# Patient Record
Sex: MALE | Race: WHITE | NOT HISPANIC OR LATINO | Employment: OTHER | URBAN - METROPOLITAN AREA
[De-identification: names, ages, dates, MRNs, and addresses within clinical notes are randomized per-mention and may not be internally consistent; named-entity substitution may affect disease eponyms.]

---

## 2017-08-25 ENCOUNTER — TELEPHONE (OUTPATIENT)
Dept: UROLOGY | Facility: CLINIC | Age: 62
End: 2017-08-25

## 2017-08-25 NOTE — TELEPHONE ENCOUNTER
----- Message from Dayanna Hunt RT sent at 8/25/2017  9:08 AM CDT -----  Contact: Haley (wife) [phone]   Haley (wife) [phone], requesting a [facility] ER F/U appt (08/13/2017) for the pt as soon as possible for kidney stones, she have copy of the CD, thanks.

## 2017-08-29 ENCOUNTER — TELEPHONE (OUTPATIENT)
Dept: UROLOGY | Facility: CLINIC | Age: 62
End: 2017-08-29

## 2017-08-29 ENCOUNTER — OFFICE VISIT (OUTPATIENT)
Dept: UROLOGY | Facility: CLINIC | Age: 62
End: 2017-08-29
Payer: COMMERCIAL

## 2017-08-29 ENCOUNTER — APPOINTMENT (OUTPATIENT)
Dept: LAB | Facility: HOSPITAL | Age: 62
End: 2017-08-29
Attending: UROLOGY
Payer: COMMERCIAL

## 2017-08-29 VITALS
DIASTOLIC BLOOD PRESSURE: 73 MMHG | WEIGHT: 253.75 LBS | SYSTOLIC BLOOD PRESSURE: 124 MMHG | BODY MASS INDEX: 35.52 KG/M2 | HEART RATE: 70 BPM | TEMPERATURE: 98 F | HEIGHT: 71 IN

## 2017-08-29 DIAGNOSIS — N20.0 NEPHROLITHIASIS: Primary | ICD-10-CM

## 2017-08-29 PROCEDURE — 82365 CALCULUS SPECTROSCOPY: CPT

## 2017-08-29 PROCEDURE — 99999 PR PBB SHADOW E&M-EST. PATIENT-LVL V: CPT | Mod: PBBFAC,,, | Performed by: UROLOGY

## 2017-08-29 PROCEDURE — 87086 URINE CULTURE/COLONY COUNT: CPT

## 2017-08-29 PROCEDURE — 99244 OFF/OP CNSLTJ NEW/EST MOD 40: CPT | Mod: S$GLB,,, | Performed by: UROLOGY

## 2017-08-29 RX ORDER — NITROGLYCERIN 0.4 MG/1
0.4 TABLET SUBLINGUAL EVERY 5 MIN PRN
Refills: 3 | COMMUNITY
Start: 2017-08-07 | End: 2021-03-26 | Stop reason: SDUPTHER

## 2017-08-29 RX ORDER — LEVOTHYROXINE SODIUM 150 UG/1
150 TABLET ORAL DAILY
Refills: 3 | COMMUNITY
Start: 2017-07-03 | End: 2021-10-06 | Stop reason: SDUPTHER

## 2017-08-29 RX ORDER — METFORMIN HYDROCHLORIDE 500 MG/1
1000 TABLET, EXTENDED RELEASE ORAL 2 TIMES DAILY
Refills: 3 | Status: ON HOLD | COMMUNITY
Start: 2017-06-03 | End: 2022-03-29 | Stop reason: SDUPTHER

## 2017-08-29 RX ORDER — PRASUGREL HYDROCHLORIDE 10 MG/1
TABLET, COATED ORAL
Refills: 5 | COMMUNITY
Start: 2017-08-20 | End: 2017-12-13 | Stop reason: SDUPTHER

## 2017-08-29 RX ORDER — SIMVASTATIN 20 MG/1
TABLET, FILM COATED ORAL
Refills: 2 | COMMUNITY
Start: 2017-07-03 | End: 2020-09-29

## 2017-08-29 RX ORDER — POTASSIUM CHLORIDE 750 MG/1
CAPSULE, EXTENDED RELEASE ORAL
Refills: 1 | COMMUNITY
Start: 2017-05-25 | End: 2020-09-02

## 2017-08-29 RX ORDER — ESCITALOPRAM OXALATE 10 MG/1
10 TABLET ORAL DAILY
Refills: 2 | COMMUNITY
Start: 2017-07-18 | End: 2023-05-03

## 2017-08-29 RX ORDER — LISINOPRIL 10 MG/1
10 TABLET ORAL DAILY
COMMUNITY
End: 2020-09-10

## 2017-08-29 RX ORDER — METOPROLOL SUCCINATE 50 MG/1
75 TABLET, EXTENDED RELEASE ORAL DAILY
Refills: 3 | COMMUNITY
Start: 2017-08-07 | End: 2021-05-28 | Stop reason: SDUPTHER

## 2017-08-29 RX ORDER — LOSARTAN POTASSIUM 50 MG/1
TABLET ORAL
Refills: 2 | COMMUNITY
Start: 2017-08-18 | End: 2020-09-08

## 2017-08-29 NOTE — LETTER
August 29, 2017      Pancho Onofre MD  [address] [place] [address]           [place] [facility]  [address]  [place] [address]  Phone: [phone]          Patient: Jigar Acharya   MR Number: [id number]   YOB: 1955   Date of Visit: 8/29/2017       Dear Dr. Pancho Onofre:    Thank you for referring Jigar Acharya to me for evaluation. Attached you will find relevant portions of my assessment and plan of care.    If you have questions, please do not hesitate to call me. I look forward to following Jigar Acharya along with you.    Sincerely,    Haley Mixon MD    Enclosure  CC:  No Recipients    If you would like to receive this communication electronically, please contact [email or web address] or [phone] to request more information on [employer] Link access.    For providers and/or their staff who would like to refer a patient to [facility], please contact us through our one-stop-shop provider referral line, [facility], at [phone].    If you feel you have received this communication in error or would no longer like to receive these types of communications, please e-mail [email or web address]

## 2017-08-29 NOTE — TELEPHONE ENCOUNTER
----- Message from Haley Mixon MD sent at 8/29/2017  2:05 PM CDT -----  Pt needs a ua and culture on sept 6 (1 week prior to procedure) may do ureteroscopy

## 2017-08-29 NOTE — PATIENT INSTRUCTIONS
Basic information to read:    Sign up for my severinokip    STRAIN ALL URINE  STOP EFFEINT ON SEPT 10TH (WILL GET CLEARANCE FROM )  OK TO STAY ON ASPIRIN (OK TO EVEN TO GO TO 325MG)  IF YOU DON'T PASS STONE THEN CT SCAN ON SEPT 11TH  AND PROCEDURE ON SEPT 13 IF STONE STILL PRESENT    BLOOD TESTS TODAY TO CHECK KIDNEY FUNCTION    WHEN DO YOU NEED STENT?  INFECTION - FEVER OR SYMPTOMS OF INFECTION  IF KIDNY FUNCTION WORSE  OR IF WHITE COUNT IS HIGH    GO TO ER AND DON'T EAT            General Urology  -Need a psa at some point  -Need KATERYNA at some point/ gu exam after stone treatment    Nephrolithiasis  -pt is on effient, also on asa 81mg for 5 cardiac stents. Ok to stay on aspirin. Ok to stop effient 2 days prior -but will get clearance from dr verdin    -pt cannot tolerate flomax, not taking pain medicine (doesn't need it)  -strain all urine, drink LOTS (4 bottles of water)  -go to ER with fever or symptoms of uti (burning, frequency, pain in bladder)    Clearance from dr verdin to be off effient for possible urs   ctrss on sept 11  Stop effeint on sept 10  Possible procedure on sept 13    Cbc, bmp today to ensure no worsening of kidney function, if kidney function worse needs procedure sooner  -send urine for culture and if positive will need a stent to drain kidney

## 2017-08-29 NOTE — PROGRESS NOTES
[facility] Note - [place]  Staff: MD Oral    Referring provider and please cc: [facility] ER - Jaqueline Valle  PCP: Dr[facility]Rivera [facility]: will sign up     Chief Complaint: kidney stone, right ureteral stone.     Subjective:        HPI: Jigar Acharya is a 62 y.o. male presents with     He went to the ER on 8/13/17 (2 weeks ago) with right sided flank pain. No fever. Wbc 11.1, cr 0.97, ua large blood (no leuk), cx was no growth. ctrss showed 2 right mid ureteral stones (8mm and 6mm) with proximal hydro. He has since passed stone. Still has pain in the right side, pain is 1.5.    He was sent home on flomax and percocet. The percocet caused him constipation which was pretty severe, pain worse than stones. Can't take flomax- makes him have urgency and frequency. No longer taking pain medicine. Only taking tylenol. Pain radiating to front.    ua today with 2+leukocytes and 1+protein/250 blood. Denies any dysuria. No pain in testicle or penis. Denies any fever.     He has a h/o stones. He passed stones and caught him. Has never needed a procedure. He had seen a urologist () - has had a 24 hour urine (was told vit  C and tea intake increased his risk). Recently saw  and wanted him to pass a stone.     AUA SSx: 17, unhappy - since having stone, prior to stone fine  IIEF: 10    ECOG Status: 0    Gross Hematuria:Yes - before he passed first stone other wise no    REVIEW OF SYSTEMS:  General ROS: no fevers, no chills  Psychological ROS: no depression  Endocrine ROS: no heat or cold  Respiratory ROS: no SOB  Cardiovascular ROS: no CP  Gastrointestinal ROS: no abdominal pain, + constipation, adrian diarrhea, no BRBPR  Musculoskeletal ROS: no muscle pain  Neurological ROS: no headaches  Dermatological ROS: no rashes  HEENT: +glasses, + sinus   ROS: per HPI     PMHx:  Diabetes  MI - 15 years ago  GERD  DLD  Htn  hypothyroid  Kidney stones: Yes   Cataracts? none    PSHx:  Hernia repair,  left  Left epididymectomy for recurrent epididymitis ()    Stents/Valves/Foreign Bodies: Yes - 5 stents, last one placed a year ago  Cardiac Evaluation: Yes -     Screening Studies  Colonoscopy: regular, b9    Fam Hx:   malignancies: No  . Father  a 82. Mother  a 80s  kidney stones: Yes - father     Soc Hx:  Former tobacco.  1.5 pk per day x 20 year  noalcohol  Lives in [place]  : yes, here with wife  Children: 4  Occupation:retired     Allergies:  Review of patient's allergies indicates no known allergies.    Medications: reviewed   Anticoagulation: Yes - effient for cardiac stents - last one was 2016   Cardiologist ()    Objective:     Vitals:    17 1213   BP: 124/73   Pulse: 70   Temp: 98 °F (36.7 °C)         General:WDWN in NAD  Eyes: PERRLA, normal conjunctiva  Respiratory: no increased work on breathing, clear to auscultation  Cardiovascular: regular rate and rhythm. No obvious extremity edema.  GI: palpation of masses. No tenderness. No hepatosplenomegaly to palpation.  Musculoskeletal: normal range of motion of bilateral upper extremities. Normal muscle strength and tone.  Skin: no obvious rashes or lesions. No tightening of skin noted.  Neurologic: CN grossly normal. Normal sensation.   Psychiatric: awake, alert and oriented x 3. Mood and affect normal. Cooperative.    :  Deferred today    ttp on muscle on right flank          LABS REVIEW:  UA today: 1.015/5/2+leukocyte/1+protien/1000 glucose/250 blood - circumcised, denies dysuria - send for culture  UCx:  17 Ng, tr blood only     Labs (received after appt) 17  Cr 1.34  lfts normal  hba1c 9.0    Cr: No results found for: CREATININE    PSA:   none  No results found for: PSA  Testosterone: No results found for: TESTOSTERONE    PATHOLOGY REVIEW:  none    RADIOGRAPHIC REVIEW:  ctrss 17 images reviwed  2 ureteral stones - more proximal stone 8.3 x 5.8 and more  distal was 6.2 x 3.5 with mild hydro proximal to stone (both stones distal)  Left sided non-obstructing stones x2 - 3mm, 4mm    Other findings: fat inguinal hernias   Small RP LN       Assessment:       1. Nephrolithiasis          Plan:      General Urology  -Need a psa at some point  -Need KATERYNA at some point/ gu exam after stone treatment    Nephrolithiasis  -pt is on effient, also on asa 81mg for 5 cardiac stents. Ok to stay on aspirin. Ok to stop effient 2 days prior -but will get clearance from dr verdin    -pt cannot tolerate flomax, not taking pain medicine (doesn't need it)  -strain all urine, drink LOTS (4 bottles of water)  -go to ER with fever or symptoms of uti (burning, frequency, pain in bladder)    Clearance from dr verdin to be off effient for possible urs   ctrss on sept 11  Stop effeint on sept 10  Possible procedure on sept 13, orders placed but will cancel if ct negative    Cbc, bmp today to ensure no worsening of kidney function, if kidney function worse needs procedure sooner  -send urine for culture and if positive will need a stent to drain kidney    Haley Mixon MD     I have routed a letter back to Dr.Sarah Valle for the consult on date of service 08/29/17.     F/u 1 month    Haley Mixon MD

## 2017-08-30 ENCOUNTER — LAB VISIT (OUTPATIENT)
Dept: LAB | Facility: HOSPITAL | Age: 62
End: 2017-08-30
Attending: UROLOGY
Payer: COMMERCIAL

## 2017-08-30 ENCOUNTER — TELEPHONE (OUTPATIENT)
Dept: UROLOGY | Facility: CLINIC | Age: 62
End: 2017-08-30

## 2017-08-30 DIAGNOSIS — N20.0 NEPHROLITHIASIS: ICD-10-CM

## 2017-08-30 DIAGNOSIS — N20.0 NEPHROLITHIASIS: Primary | ICD-10-CM

## 2017-08-30 LAB
ANION GAP SERPL CALC-SCNC: 14 MMOL/L
BUN SERPL-MCNC: 15 MG/DL
CALCIUM SERPL-MCNC: 9.8 MG/DL
CHLORIDE SERPL-SCNC: 102 MMOL/L
CO2 SERPL-SCNC: 23 MMOL/L
CREAT SERPL-MCNC: 1.2 MG/DL
ERYTHROCYTE [DISTWIDTH] IN BLOOD BY AUTOMATED COUNT: 13.1 %
EST. GFR  (AFRICAN AMERICAN): >60 ML/MIN/1.73 M^2
EST. GFR  (NON AFRICAN AMERICAN): >60 ML/MIN/1.73 M^2
GLUCOSE SERPL-MCNC: 157 MG/DL
HCT VFR BLD AUTO: 40.7 %
HGB BLD-MCNC: 13.6 G/DL
MCH RBC QN AUTO: 31.8 PG
MCHC RBC AUTO-ENTMCNC: 33.4 G/DL
MCV RBC AUTO: 95 FL
PLATELET # BLD AUTO: 327 K/UL
PMV BLD AUTO: 8.6 FL
POTASSIUM SERPL-SCNC: 4.3 MMOL/L
RBC # BLD AUTO: 4.28 M/UL
SODIUM SERPL-SCNC: 139 MMOL/L
WBC # BLD AUTO: 9.5 K/UL

## 2017-08-30 PROCEDURE — 80048 BASIC METABOLIC PNL TOTAL CA: CPT

## 2017-08-30 PROCEDURE — 36415 COLL VENOUS BLD VENIPUNCTURE: CPT

## 2017-08-30 PROCEDURE — 85027 COMPLETE CBC AUTOMATED: CPT

## 2017-08-30 NOTE — TELEPHONE ENCOUNTER
----- Message from Mikie Law sent at 8/30/2017  9:39 AM CDT -----  Contact: wife-  Haley [phone]  Patient's wife called stating the patient passed kidney stone. The wife want to know if the doctor want patient to keep scheduled appointments Thanks!

## 2017-08-30 NOTE — TELEPHONE ENCOUNTER
Spoke with patient's wife all appointments cancelled. Scheduled labs and mailed 24 hour urine order

## 2017-08-30 NOTE — TELEPHONE ENCOUNTER
Great!   Please cancel his ct and f/u urine tests  I cancelled his procedure on the 13th.     Please have him do a pth, uric acid, psa in 1 month  As well as a 24 hour urine at that time.   Then we will see him back to discuss the 24 hour urine, review his labs and do a KATERYNA at that time.

## 2017-08-31 LAB
ANNOTATION COMMENT IMP: NORMAL
BACTERIA UR CULT: NORMAL
COMPN STONE: NORMAL
SPECIMEN SOURCE: NORMAL
STONE ANALYSIS IR-IMP: NORMAL

## 2017-09-05 ENCOUNTER — TELEPHONE (OUTPATIENT)
Dept: UROLOGY | Facility: CLINIC | Age: 62
End: 2017-09-05

## 2017-09-05 DIAGNOSIS — N20.0 NEPHROLITHIASIS: Primary | ICD-10-CM

## 2017-09-06 NOTE — TELEPHONE ENCOUNTER
Mr zhu was cleared for surgery by  8/29/17 but does not need it since he passe stone  He does still have some left sided stones that may need some treatment in the fturue  Has ok to dc effient prior to procedure in future should he need to    Pt will f/u after 24 hour urine  kub and rbus  for stones on left in 6 months  Also needs psa, pth and uric acid priro to his f/u  Make sure he has a 6 month f/u no matter with kub and rbus prior

## 2017-09-06 NOTE — TELEPHONE ENCOUNTER
----- Message from Sondra Sarmiento sent at 9/6/2017 11:35 AM CDT -----  Patient missed a call from office please call [phone] (home)

## 2017-11-29 ENCOUNTER — TELEPHONE (OUTPATIENT)
Dept: UROLOGY | Facility: CLINIC | Age: 62
End: 2017-11-29

## 2017-11-29 NOTE — TELEPHONE ENCOUNTER
I was called today for consult from [facility]. There was some confusion about him still having 2 right ureteral stones. He is admitted for pancreatitis and had a ct at [place] which they though showed 2 right ureteral stones still. I had them obtain a copy of the read and the read says that he only has a left renal stone which we knew about.    The pt is supposed to do a 24 hour urine and then f/u in march 2017 or around there with labs and kub.  Please confirm he still has all these appts and call the nurse at [facility] memo (pt in room 1101) and give them appt dates and times and f/u and make sure he is no longer NPO

## 2017-11-29 NOTE — TELEPHONE ENCOUNTER
Spoke with Emily at [facility] appointments given to her and also informed her patient should no longer be NPO. Emily verbally voiced understanding

## 2017-12-13 RX ORDER — PRASUGREL 10 MG/1
TABLET, FILM COATED ORAL
Refills: 2 | COMMUNITY
Start: 2017-11-17 | End: 2018-06-28 | Stop reason: ALTCHOICE

## 2017-12-13 RX ORDER — PREDNISOLONE ACETATE 10 MG/ML
SUSPENSION/ DROPS OPHTHALMIC
Refills: 1 | COMMUNITY
Start: 2017-11-17 | End: 2017-12-14

## 2017-12-13 RX ORDER — ASPIRIN 81 MG/1
162 TABLET ORAL DAILY
COMMUNITY
End: 2018-06-28

## 2017-12-14 ENCOUNTER — OFFICE VISIT (OUTPATIENT)
Dept: SURGERY | Facility: CLINIC | Age: 62
End: 2017-12-14
Payer: COMMERCIAL

## 2017-12-14 VITALS
DIASTOLIC BLOOD PRESSURE: 73 MMHG | BODY MASS INDEX: 35.42 KG/M2 | SYSTOLIC BLOOD PRESSURE: 124 MMHG | WEIGHT: 253 LBS | HEIGHT: 71 IN

## 2017-12-14 DIAGNOSIS — K85.80 OTHER ACUTE PANCREATITIS WITHOUT INFECTION OR NECROSIS: Primary | ICD-10-CM

## 2017-12-14 PROCEDURE — 99243 OFF/OP CNSLTJ NEW/EST LOW 30: CPT | Mod: ,,, | Performed by: SURGERY

## 2017-12-14 RX ORDER — OSELTAMIVIR PHOSPHATE 75 MG/1
CAPSULE ORAL
Refills: 0 | COMMUNITY
Start: 2017-12-02 | End: 2017-12-14

## 2017-12-14 RX ORDER — CEFDINIR 300 MG/1
CAPSULE ORAL
Refills: 0 | COMMUNITY
Start: 2017-12-02 | End: 2017-12-14

## 2017-12-14 RX ORDER — BENZONATATE 100 MG/1
CAPSULE ORAL
Refills: 0 | COMMUNITY
Start: 2017-12-02 | End: 2017-12-14

## 2017-12-14 RX ORDER — PROMETHAZINE HYDROCHLORIDE AND CODEINE PHOSPHATE 6.25; 1 MG/5ML; MG/5ML
SOLUTION ORAL
Refills: 0 | COMMUNITY
Start: 2017-12-02 | End: 2017-12-14

## 2017-12-14 RX ORDER — PANTOPRAZOLE SODIUM 40 MG/1
TABLET, DELAYED RELEASE ORAL
Refills: 0 | Status: ON HOLD | COMMUNITY
Start: 2017-11-29 | End: 2021-03-03 | Stop reason: HOSPADM

## 2017-12-14 NOTE — LETTER
December 14, 2017      Atul Naranjo MD  [address]  [address]  [place] [address]           [facility]  [address]  [address]  [place] [address]  Phone: [phone]  Fax: [phone]          Patient: Jigar Acharya   MR Number: [id number]   YOB: 1955   Date of Visit: 12/14/2017       Dear Dr. Atul Naranjo:    Thank you for referring Jigar Acharya to me for evaluation. Attached you will find relevant portions of my assessment and plan of care.    If you have questions, please do not hesitate to call me. I look forward to following Jigar Acharya along with you.    Sincerely,    Kevin Harris III, MD    Enclosure  CC:  No Recipients    If you would like to receive this communication electronically, please contact [email or web address] or [phone] to request more information on [employer] access.    For providers and/or their staff who would like to refer a patient to [facility], please contact us through our one-stop-shop provider referral line, [facility], at [phone].    If you feel you have received this communication in error or would no longer like to receive these types of communications, please e-mail [email or web address]

## 2017-12-14 NOTE — PROGRESS NOTES
Subjective:       Patient ID: Jigar Acharya is a 62 y.o. male.    Chief Complaint: Gall Bladder Problem (Referred by [facility] ER to eval Gallbladder)      HPI:  Patient was admitted with acute pancreatitis two weeks ago.  No definite cause of the pancreatitis as identified.  He is not a heavy drinker.  No gallstones were appreciated on CT or Us.  He pancreatitis resolved without any other issue.  He has hx of hypertriglyceridemia and CAD.  I spoke with his cardiologist who state his triglycerides have been highly elevated at times in the past.  That was his first episode of pancreatitis.  He has no complaints today.  He has no abdominal pain, nausea, fever or chills.     Past Medical History:   Diagnosis Date    Coronary artery disease 2004    Diabetes mellitus, type 2     Diverticulitis     GERD (gastroesophageal reflux disease)     Hyperlipidemia     Hypothyroidism 2003    Kidney stones     Sleep apnea      Past Surgical History:   Procedure Laterality Date    Coronary Angiography  01/01/2004    CORONARY STENT PLACEMENT      HERNIA REPAIR      Inguinal     Review of patient's allergies indicates:   Allergen Reactions    Demerol [meperidine] Other (See Comments)     Low Blood Pressure     Medication List with Changes/Refills   Current Medications    ASPIRIN (ECOTRIN) 81 MG EC TABLET    Take 162 mg by mouth once daily.    ESCITALOPRAM OXALATE (LEXAPRO) 10 MG TABLET    TK 1 T PO QD    LEVOTHYROXINE (SYNTHROID) 150 MCG TABLET    1 T D    LISINOPRIL 10 MG TABLET    Take 10 mg by mouth once daily.    LOSARTAN (COZAAR) 50 MG TABLET    TK 1 T PO  QD    METFORMIN (GLUCOPHAGE-XR) 500 MG 24 HR TABLET    TK 2 TS PO BID    METOPROLOL SUCCINATE (TOPROL-XL) 50 MG 24 HR TABLET    TK 1 AND 1/2 TS PO D    NITROGLYCERIN (NITROSTAT) 0.4 MG SL TABLET    BLAYNE PRF ANGINA IF EXCEEDS 3 WITHIN 5 MINUTE PERIOD IF NO RELIEF 15 MINUTE APART GO TO THE ER    PANTOPRAZOLE (PROTONIX) 40 MG TABLET        POTASSIUM CHLORIDE (MICRO-K) 10  MEQ CPSR    TK 1 C PO QD    PRASUGREL (EFFIENT) 10 MG TAB    TK 1 T PO QD    RANITIDINE (ZANTAC) 300 MG TABLET    TK 1 T PO  D    SIMVASTATIN (ZOCOR) 20 MG TABLET    TK 1 T PO  QPM   Discontinued Medications    BENZONATATE (TESSALON) 100 MG CAPSULE    TK 1 TO 2 CS PO Q 8 H PRF COUGH    CEFDINIR (OMNICEF) 300 MG CAPSULE    TK 2 CS PO QD FOR 10 DAYS    OSELTAMIVIR (TAMIFLU) 75 MG CAPSULE    TK ONE C PO  BID FOR 5 DAYS    PREDNISOLONE ACETATE (PRED FORTE) 1 % DRPS    INT 1 GTT INTO THE RIGHT EYE BID    PROMETHAZINE-CODEINE 6.25-10 MG/5 ML (PHENERGAN WITH CODEINE) 6.25-10 MG/5 ML SYRUP    TK 5 ML PO Q 6 H PRF COUGH     Family History   Problem Relation Age of Onset    No Known Problems Mother     No Known Problems Father     Colon cancer Paternal Aunt     Heart disease Maternal Grandfather      Social History     Social History    Marital status:      Spouse name: N/A    Number of children: N/A    Years of education: N/A     Social History Main Topics    Smoking status: Former Smoker    Smokeless tobacco: Never Used    Alcohol use No    Drug use: Unknown    Sexual activity: Not Currently     Partners: Female     Other Topics Concern    None     Social History Narrative    None         Review of Systems   Constitutional: Negative for appetite change, chills, fever and unexpected weight change.   HENT: Negative for hearing loss, rhinorrhea, sore throat and voice change.    Eyes: Negative for photophobia and visual disturbance.   Respiratory: Negative for cough, choking and shortness of breath.    Cardiovascular: Negative for chest pain, palpitations and leg swelling.   Gastrointestinal: Negative for abdominal pain, blood in stool, constipation, diarrhea, nausea and vomiting.   Endocrine: Negative for cold intolerance, heat intolerance, polydipsia and polyuria.   Musculoskeletal: Negative for arthralgias, back pain, joint swelling and neck stiffness.   Skin: Negative for color change, pallor and rash.    Neurological: Negative for dizziness, seizures, syncope and headaches.   Hematological: Negative for adenopathy. Does not bruise/bleed easily.   Psychiatric/Behavioral: Negative for agitation, behavioral problems and confusion.       Objective:      Physical Exam   Constitutional: He appears well-developed and well-nourished.  Non-toxic appearance. No distress.   HENT:   Head: Normocephalic and atraumatic. Head is without abrasion and without laceration.   Right Ear: External ear normal.   Left Ear: External ear normal.   Nose: Nose normal.   Mouth/Throat: Oropharynx is clear and moist.   Eyes: EOM are normal. Pupils are equal, round, and reactive to light.   Neck: Trachea normal. No tracheal deviation and normal range of motion present.   Cardiovascular: Normal rate and regular rhythm.    Pulmonary/Chest: Effort normal. No accessory muscle usage. No tachypnea. No respiratory distress.   Abdominal: Soft. Normal appearance and bowel sounds are normal. He exhibits no distension and no mass. There is no hepatosplenomegaly. There is no tenderness. There is no rigidity, no rebound and no guarding. No hernia.   Lymphadenopathy:        Right: No inguinal adenopathy present.        Left: No inguinal adenopathy present.   Neurological: He is alert. Coordination and gait normal.   Skin: Skin is warm and intact.   Psychiatric: He has a normal mood and affect. His speech is normal and behavior is normal.       Assessment/Plan:   Jigar was seen today for gall bladder problem.    Diagnoses and all orders for this visit:    Other acute pancreatitis without infection or necrosis  Comments:  No gallstones appreciated on US or CT.He has history of hypertriglyceridemia.Will continue to observe for now.If pancreatitis recurrs, will reconsider lap shreyas      If triglycerides are controlled and pancreatitis recurs.  Will reconsider lap shreyas.

## 2018-03-08 ENCOUNTER — HOSPITAL ENCOUNTER (OUTPATIENT)
Dept: RADIOLOGY | Facility: HOSPITAL | Age: 63
Discharge: HOME | End: 2018-03-08
Attending: UROLOGY
Payer: COMMERCIAL

## 2018-03-08 DIAGNOSIS — N20.0 NEPHROLITHIASIS: ICD-10-CM

## 2018-03-08 PROCEDURE — 74018 RADEX ABDOMEN 1 VIEW: CPT | Mod: 26,,, | Performed by: RADIOLOGY

## 2018-03-08 PROCEDURE — 76770 US EXAM ABDO BACK WALL COMP: CPT | Mod: TC

## 2018-03-08 PROCEDURE — 76770 US EXAM ABDO BACK WALL COMP: CPT | Mod: 26,,, | Performed by: RADIOLOGY

## 2018-03-08 PROCEDURE — 74018 RADEX ABDOMEN 1 VIEW: CPT | Mod: TC,FY

## 2018-03-09 ENCOUNTER — OFFICE VISIT (OUTPATIENT)
Dept: UROLOGY | Facility: CLINIC | Age: 63
End: 2018-03-09
Payer: COMMERCIAL

## 2018-03-09 VITALS
HEIGHT: 71 IN | WEIGHT: 261.25 LBS | SYSTOLIC BLOOD PRESSURE: 121 MMHG | DIASTOLIC BLOOD PRESSURE: 69 MMHG | BODY MASS INDEX: 36.57 KG/M2 | HEART RATE: 72 BPM | TEMPERATURE: 99 F

## 2018-03-09 DIAGNOSIS — R81 GLUCOSURIA: ICD-10-CM

## 2018-03-09 DIAGNOSIS — K40.90 RIGHT INGUINAL HERNIA: ICD-10-CM

## 2018-03-09 DIAGNOSIS — N20.0 NEPHROLITHIASIS: Primary | ICD-10-CM

## 2018-03-09 LAB
BILIRUB SERPL-MCNC: ABNORMAL MG/DL
BLOOD URINE, POC: ABNORMAL
COLOR, POC UA: ABNORMAL
GLUCOSE UR QL STRIP: 500
KETONES UR QL STRIP: ABNORMAL
LEUKOCYTE ESTERASE URINE, POC: ABNORMAL
NITRITE, POC UA: ABNORMAL
PH, POC UA: 5
PROTEIN, POC: ABNORMAL
SPECIFIC GRAVITY, POC UA: 1020
UROBILINOGEN, POC UA: ABNORMAL

## 2018-03-09 PROCEDURE — 99999 PR PBB SHADOW E&M-EST. PATIENT-LVL IV: CPT | Mod: PBBFAC,,, | Performed by: UROLOGY

## 2018-03-09 PROCEDURE — 99214 OFFICE O/P EST MOD 30 MIN: CPT | Mod: 25,S$GLB,, | Performed by: UROLOGY

## 2018-03-09 PROCEDURE — 81002 URINALYSIS NONAUTO W/O SCOPE: CPT | Mod: S$GLB,,, | Performed by: UROLOGY

## 2018-03-09 NOTE — PROGRESS NOTES
"[facility] Progress Note - [place]  Urology Group: Oral/Con/[place]/[facility]  PCP: Dr[facility]Perninckle [facility]: inactive    Chief Complaint: nephrolithiasis,     Subjective:        HPI: Jigar Acharya is a 63 y.o. male presents with     Kidney stones  -8/13/17 with right sided flank pain. No fever. Wbc 11.1, cr 0.97, ua large blood (no leuk), cx was no growth. ctrss showed 2 right mid ureteral stones (8mm and 6mm) with proximal hydro. Sent home for trial passage and passed first stone. Seen by me on 8/29/17 and was still having pain.   -decided to proceed with trial of passage still and a few days later passed the second stone. He brought this with him today.  -still has 2 left renal stones and a kub and rbus were ordered for f/u.   -rbus 3/8/18: Possible right renal stone, No left renal stone (previous ct showed renal stones).   -kub 3/8/18: Stone seen on right (L3) - seen on us but not CT. Stone seen on left, bottom of L3 c/w 2 stones seen on previous CT  -Stone analysis  8/31/17: 100% ca oxalate  -He has a h/o stones. He passed stones and caught him. Has never needed a procedure. He had seen a urologist () - has had a 24 hour urine (was told vit  C and tea intake increased his risk). He used to see .    Right inguinal pain  -h/o hernia years ago, c.o right inguinal pain present x a few day.     Glucosuria  -has 500 sugar in urine today. On metform for "pre-diabetes"  -had labs done yesterday    Was supposed to return with psa       AUASSx: 4, delighted  IIEF:11    REVIEW OF SYSTEMS:  General ROS: no fevers, no chills  Psychological ROS: no depression  Endocrine ROS: no heat or cold  Respiratory ROS: no SOB  Cardiovascular ROS: no CP  Gastrointestinal ROS: no abdominal pain, no constipation, no diarrhea, no BRBPR  Musculoskeletal ROS: no muscle pain  Neurological ROS: no headaches  Dermatological ROS: no rashes  HEENT: noglasses, no sinus   ROS: per HPI     The past " medical, surgical, social and family hx were reviewed. There have not been any changes.   Cataracts? none    Urologic meds: no  Anticoagulation: No more effient, asa 325mg only - h/o 5 stents, last one was 2016    Objective:     Vitals:    03/09/18 1156   BP: 121/69   Pulse: 72   Temp: 98.5 °F (36.9 °C)       General:WDWN in NAD  Eyes: PERRLA, normal conjunctiva  Respiratory: No increased work on breathing.   Cardiovascular: No obvious extremity edema. Warm and well perfused.   GI: palpation of masses. No tenderness. No hepatosplenomegaly to palpation.  Musculoskeletal: normal range of motion of bilateral upper extremities. Normal muscle strength and tone.  Skin: no obvious rashes or lesions. No tightening of skin noted.  Neurologic: CN grossly normal. Normal sensation.   Psychiatric: awake, alert and oriented x 3. Mood and affect normal. Cooperative.    :  Inspection of anus normal  No scrotal rashes, cysts or lesions  Epididymis normal in size, no tenderness  Testes normal and size, equal size bilaterally, no masses  Urethral meatus normal without discharge  Penis is circumcised   KATERYNA: 30 g gland without masses, tenderness. SV not palpable. Normal sphincter tone. +hemhorroids.  No bilateral inguinal hernias noted       Urinalysis: 1.020/5/500 glucose  Labs:  Need psa   8/30/17 cr 1.2    Path:   Stone- ca oxalate     Rads:   kub 3/8/18  Stone seen on right (L3) - seen on us but not CT  Stone seen on left, bottom of L3 c/w 2 stones seen on previous CT    rbus 3/8/18  Possible right renal stone  No left renal stone (previous ct showed renal stones)  Mild right renal cortical thinning    ctrss 8/13/17 images reviwed  2 ureteral stones - more proximal stone 8.3 x 5.8 and more distal was 6.2 x 3.5 with mild hydro proximal to stone (both stones distal)  Left sided non-obstructing stones x2 - 3mm, 4mm     Other findings: fat inguinal hernias   Small RP LN        Assessment:       1. Nephrolithiasis    2. Glucosuria     3. Right inguinal hernia        Plan:     Nephrolithiasis  -has a new right renal stone possible, 2 left renal stones  -go to ER with any suspected passing stone and fever for a drain  -if he feels like passing stone in future and call and I can give him pain medicine and flomax and maybe order imaging.   -explained he will always make stones, needs to drink more water and eat less salt  -repeat the kub and rbus in 6 months (ordered today)  -needs 24 hour urine    Glucosuria  -send a copy of urine to dr back, he had labs drawn yesterday    Right inguinal hernia  -referral to general surgery  -no large hernia felt, expliaind will likely recommend weight loss but he can see them for further recs especially if pain persists and its not the testicle.   -high riding right testicle non tender.  +hemhorroids but asymptomatic    Counseled to see me if his testicle becomes tender or hard. Could be infection.     May need psa if none done at [facility]    F/u 3 months to discuss 24 hour urine

## 2018-03-14 ENCOUNTER — TELEPHONE (OUTPATIENT)
Dept: UROLOGY | Facility: CLINIC | Age: 63
End: 2018-03-14

## 2018-03-14 NOTE — TELEPHONE ENCOUNTER
Let pt know his last PSA as over 4 years ago. We like to check yearly bw ages 56 and 71  He needs a psa

## 2018-03-14 NOTE — TELEPHONE ENCOUNTER
----- Message from Bree Henao LPN sent at 3/14/2018 10:06 AM CDT -----  Patient called office back and stated Dr.Donald Kaiser should have psa on file. Will give them a call. PSA result received from  from  2013  ----- Message -----  From: Haley Mixon MD  Sent: 3/14/2018  10:04 AM  To: Bree Henao LPN    He had told me     ----- Message -----  From: Bree Henao LPN  Sent: 3/14/2018   9:00 AM  To: Haley Mixon MD     Spoke with patient he states he will give the office a call back. He can't remember the doctors name that has a psa on file for him but will call and speak to his wife    ----- Message -----  From: Haley Mixon MD  Sent: 3/9/2018   5:02 PM  To: Bree Henao LPN    Ok I placed an order can you ask him to have this done at his convenience in the next week or two  ----- Message -----  From: Bree Henao LPN  Sent: 3/9/2018   3:18 PM  To: Haley Mixon MD    Spoke with 's office patient has not been seen since august 2017 and no psa on file. No poct urine results in computer to fax  ----- Message -----  From: Haley Mixon MD  Sent: 3/9/2018  12:38 PM  To: [facility]    Please get his psa from dr back's office  Also send our urinalysis results to dr moore

## 2018-03-15 NOTE — TELEPHONE ENCOUNTER
Spoke with patient informed him of recommendations. Patient verbally voiced understanding and he will stop at his convenience for blood work

## 2018-03-19 ENCOUNTER — TELEPHONE (OUTPATIENT)
Dept: UROLOGY | Facility: CLINIC | Age: 63
End: 2018-03-19

## 2018-03-27 ENCOUNTER — LAB VISIT (OUTPATIENT)
Dept: LAB | Facility: HOSPITAL | Age: 63
End: 2018-03-27
Attending: UROLOGY
Payer: COMMERCIAL

## 2018-03-27 DIAGNOSIS — N20.0 NEPHROLITHIASIS: ICD-10-CM

## 2018-03-27 LAB — COMPLEXED PSA SERPL-MCNC: 0.84 NG/ML

## 2018-03-27 PROCEDURE — 36415 COLL VENOUS BLD VENIPUNCTURE: CPT

## 2018-03-27 PROCEDURE — 84153 ASSAY OF PSA TOTAL: CPT

## 2018-06-27 ENCOUNTER — TELEPHONE (OUTPATIENT)
Dept: UROLOGY | Facility: CLINIC | Age: 63
End: 2018-06-27

## 2018-06-27 NOTE — TELEPHONE ENCOUNTER
----- Message from Sybil Tinajero sent at 6/27/2018  2:34 PM CDT -----  Contact: Patient  Type:  Patient Returning Call    Who Called:  Patient   Who Left Message for Patient:  Bree  Does the patient know what this is regarding?:    Best Call Back Number:    Additional Information:

## 2018-06-28 ENCOUNTER — OFFICE VISIT (OUTPATIENT)
Dept: UROLOGY | Facility: CLINIC | Age: 63
End: 2018-06-28
Payer: COMMERCIAL

## 2018-06-28 VITALS
BODY MASS INDEX: 36.33 KG/M2 | HEART RATE: 64 BPM | TEMPERATURE: 98 F | HEIGHT: 71 IN | SYSTOLIC BLOOD PRESSURE: 137 MMHG | WEIGHT: 259.5 LBS | DIASTOLIC BLOOD PRESSURE: 88 MMHG

## 2018-06-28 DIAGNOSIS — N20.0 NEPHROLITHIASIS: Primary | ICD-10-CM

## 2018-06-28 DIAGNOSIS — R81 GLUCOSURIA: ICD-10-CM

## 2018-06-28 DIAGNOSIS — R39.15 URGENCY OF MICTURITION: ICD-10-CM

## 2018-06-28 DIAGNOSIS — R82.992 HYPEROXALURIA: ICD-10-CM

## 2018-06-28 LAB
BILIRUB SERPL-MCNC: ABNORMAL MG/DL
BLOOD URINE, POC: ABNORMAL
COLOR, POC UA: YELLOW
GLUCOSE UR QL STRIP: 50
KETONES UR QL STRIP: ABNORMAL
LEUKOCYTE ESTERASE URINE, POC: ABNORMAL
NITRITE, POC UA: ABNORMAL
PH, POC UA: 5
PROTEIN, POC: ABNORMAL
SPECIFIC GRAVITY, POC UA: 1.01
UROBILINOGEN, POC UA: ABNORMAL

## 2018-06-28 PROCEDURE — 3008F BODY MASS INDEX DOCD: CPT | Mod: CPTII,S$GLB,, | Performed by: UROLOGY

## 2018-06-28 PROCEDURE — 99214 OFFICE O/P EST MOD 30 MIN: CPT | Mod: 25,S$GLB,, | Performed by: UROLOGY

## 2018-06-28 PROCEDURE — 81002 URINALYSIS NONAUTO W/O SCOPE: CPT | Mod: S$GLB,,, | Performed by: UROLOGY

## 2018-06-28 PROCEDURE — 99999 PR PBB SHADOW E&M-EST. PATIENT-LVL IV: CPT | Mod: PBBFAC,,, | Performed by: UROLOGY

## 2018-06-28 RX ORDER — DULAGLUTIDE 1.5 MG/.5ML
INJECTION, SOLUTION SUBCUTANEOUS
Refills: 12 | COMMUNITY
Start: 2018-06-13 | End: 2019-06-14

## 2018-06-28 RX ORDER — ASPIRIN 325 MG
325 TABLET ORAL DAILY
COMMUNITY

## 2018-06-28 RX ORDER — BLOOD-GLUCOSE METER
EACH MISCELLANEOUS
Refills: 12 | COMMUNITY
Start: 2018-05-16

## 2018-06-28 NOTE — PROGRESS NOTES
[facility] Progress Note - [place]  Urology Group: [facility]  [facility]    Chief Complaint: nephrolithiasis    Subjective:        HPI: Jigar Acharya is a 63 y.o. male presents with     Kidney stones  -He has a h/o stones. He passed stones and caught him. Has never needed a procedure. He had seen a urologist () - has had a 24 hour urine (was told vit  C and tea intake increased his risk). He used to see .   -8/13/17 with right sided flank pain. No fever. Wbc 11.1, cr 0.97, ua large blood (no leuk), cx was no growth. ctrss showed 2 right mid ureteral stones (8mm and 6mm) with proximal hydro. Sent home for trial passage and passed first stone. Seen by me on 8/29/17 and was still having pain. Decided to proceed with trial of passage still and a few days later passed the second stone.   -still has 2 left renal stones and a kub and rbus were ordered for f/u. rbus 3/8/18: Possible right renal stone, No left renal stone (previous ct showed renal stones). KUB  3/8/18: Stone seen on right (L3) - seen on us but not CT. Stone seen on left, bottom of L3 c/w 2 stones seen on previous CT  -Stone analysis  8/31/17: 100% ca oxalate    He returns today stating he's had urgency with no void. No frequency. Similar to previous stones. Review previous ctrss showed he still had 2 left renal stones. Urine today has no blood. Denies any flank pain.   pvr today: 20cc.    24 Hour Urine Results From [facility]  Date: 5/11/18    Urine volume (0.5-4L): 1.75L (sweats a lot and doesn't replenish fluids a few times a week)  SS CaOx (6-10): 7.16   Urine Calcium (mg/day) (male <250, female <200): 204  Urine Oxalate (mg/day) (20-40): 44 H (was eating oatmeal every day, used to drink tea nightly now only drinks 1-2x a week)  Urine Citrate (mg/day)(male>450, female>550): 1073  SS CaP (0.5-2): 0.48  24 Hour Urine pH (5.8-6.2):5.393  SS Uric Acid (0-1):  1.17  Urine Uric Acid (g/day) (male<0.800, female<0.750): 0.392  Creatinine: 2257    Interpretation of Lab Results:  suboptimal urine volume -increase fluid intake (1 gallon a day) to increase urine output to 2.5 L per day  Borderline hyperoxaluria  Low urine pH  Moderate caox stone risk  Mild uric acid supersaturation    Stone analysis: 8/29/17 calcium oxalate   Stone labs: calcium 9.8    Right inguinal pain  -h/o hernia years ago, c.o right inguinal pain present x a few day.  -exam was negative last visit, referred to gen surg but he never went and no longer c/o pain.     Glucosuria  -has 50 of glucose, last time had 500. a1c was 7.7. pcp added trulicity.    psa was normal at 0.84    AUASSx: 4, delighted  IIEF:11    REVIEW OF SYSTEMS:  General ROS: no fevers, no chills  Psychological ROS: no depression  Endocrine ROS: no heat or cold  Respiratory ROS: no SOB  Cardiovascular ROS: no CP  Gastrointestinal ROS: no abdominal pain, no constipation, no diarrhea, no BRBPR  Musculoskeletal ROS: no muscle pain  Neurological ROS: no headaches  Dermatological ROS: no rashes  HEENT: noglasses, no sinus   ROS: per HPI     The past medical, surgical, social and family hx were reviewed. There have not been any changes.   Cataracts? none    Urologic meds: no  Anticoagulation: he was told he was to discontinue effient a few months ago, asa 325mg only - h/o 5 stents, last one was 2016    Objective:     Vitals:    06/28/18 1333   BP: 137/88   Pulse: 64   Temp: 97.6 °F (36.4 °C)       General:WDWN in NAD  Eyes: PERRLA, normal conjunctiva  Respiratory: No increased work on breathing.   Cardiovascular: No obvious extremity edema. Warm and well perfused.   GI: palpation of masses. No tenderness. No hepatosplenomegaly to palpation.  Musculoskeletal: normal range of motion of bilateral upper extremities. Normal muscle strength and tone.  Skin: no obvious rashes or lesions. No tightening of skin noted.  Neurologic: CN grossly normal.  Normal sensation.   Psychiatric: awake, alert and oriented x 3. Mood and affect normal. Cooperative.     3/9/18:  Inspection of anus normal  No scrotal rashes, cysts or lesions  Epididymis normal in size, no tenderness  Testes normal and size, equal size bilaterally, no masses  Urethral meatus normal without discharge  Penis is circumcised   KATERYNA: 30 g gland without masses, tenderness. SV not palpable. Normal sphincter tone. +hemhorroids.  No bilateral inguinal hernias noted       Urinalysis: 1.020/5/500 glucose  Labs:  psa  3/27/18 0.84    cr  8/30/17  1.2    Path:   Stone- ca oxalate     Rads:   kub 3/8/18  Stone seen on right (L3) - seen on us but not CT  Stone seen on left, bottom of L3 c/w 2 stones seen on previous CT    rbus 3/8/18  Possible right renal stone  No left renal stone (previous ct showed renal stones)  Mild right renal cortical thinning    ctrss 8/13/17 images reviwed  2 ureteral stones - more proximal stone 8.3 x 5.8 and more distal was 6.2 x 3.5 with mild hydro proximal to stone (both stones distal)  Left sided non-obstructing stones x2 - 3mm, 4mm     Other findings: fat inguinal hernias   Small RP LN        Assessment:       1. Nephrolithiasis    2. Glucosuria    3. Urgency of micturition    4. Hyperoxaluria        Plan:     Nephrolithiasis, hyperoxaluria, low urine volume, urgency of urination.   -has been having urgency for the past few days and occurred previously.   -I suspect he has another stone. He does not want to take flomax bc he says he has incontinence with flomax. Explained flomax can help pass stone. Only thing against him having stone is no blood in urine. Need to confirm no ureteral stones. He did pass stones previously but they may have just been small pieces.  -knows to go to ER for stent if he has fever and pain from stone for drain.  -hyperoxaluria: will change to every other day oatmeal intake  -low fluid intake: will increase fluid/clear fluid intake by 50%. No caffeine.      If ct show s stone will call him with a plan. Ct scan to be done this week.   Currently on asa 325mg. ua today negative for infection.   Will get a copy of his labs from [facility], need to check bmp.  - 6/26/18 c 0.64. a1c 7.7    Follow-up in 9 months with xray and ultrasound prior depending on ct results, have not ordered these yet. Around march 2018. Would also get a psa and plan for KATERYNA in march 2018.

## 2018-06-28 NOTE — PATIENT INSTRUCTIONS
Nephrolithiasis, hyperoxaluria, low urine volume, urgency of urination.   -has been having urgency for the past few days and occurred previously.   -I suspect he has another stone. He does not want to take flomax bc he says he has incontinence with flomax. Explained flomax can help pass stone. Only thing against him having stone is no blood in urine. Need to confirm no ureteral stones. He did pass stones previously but they may have just been small pieces.  -knows to go to ER for stent if he has fever and pain from stone for drain.  -hyperoxaluria: will change to every other day oatmeal intake  -low fluid intake: will increase fluid/clear fluid intake by 50%. No caffeine.     If ct shows stone will call him with a plan.   Currently on asa 325mg. ua today negative for infection.     Follow-up in 9 months with xray and ultrasound prior depending on ct results, have not ordered these yet. Around march 2018. Would also get a psa and plan for KATERYNA in march 2018.

## 2018-07-02 ENCOUNTER — TELEPHONE (OUTPATIENT)
Dept: UROLOGY | Facility: CLINIC | Age: 63
End: 2018-07-02

## 2018-07-02 ENCOUNTER — HOSPITAL ENCOUNTER (OUTPATIENT)
Dept: RADIOLOGY | Facility: HOSPITAL | Age: 63
Discharge: HOME | End: 2018-07-02
Attending: UROLOGY
Payer: COMMERCIAL

## 2018-07-02 DIAGNOSIS — R39.15 URGENCY OF MICTURITION: ICD-10-CM

## 2018-07-02 DIAGNOSIS — N20.0 NEPHROLITHIASIS: Primary | ICD-10-CM

## 2018-07-02 DIAGNOSIS — R81 GLUCOSURIA: ICD-10-CM

## 2018-07-02 DIAGNOSIS — N20.0 NEPHROLITHIASIS: ICD-10-CM

## 2018-07-02 PROCEDURE — 74176 CT ABD & PELVIS W/O CONTRAST: CPT | Mod: 26,,, | Performed by: RADIOLOGY

## 2018-07-02 PROCEDURE — 74176 CT ABD & PELVIS W/O CONTRAST: CPT | Mod: TC

## 2018-07-02 RX ORDER — TAMSULOSIN HYDROCHLORIDE 0.4 MG/1
0.4 CAPSULE ORAL
Qty: 30 CAPSULE | Refills: 0 | Status: SHIPPED | OUTPATIENT
Start: 2018-07-02 | End: 2019-12-12

## 2018-07-02 RX ORDER — KETOROLAC TROMETHAMINE 10 MG/1
10 TABLET, FILM COATED ORAL EVERY 6 HOURS PRN
Qty: 10 TABLET | Refills: 0 | Status: SHIPPED | OUTPATIENT
Start: 2018-07-02 | End: 2018-07-07

## 2018-07-03 NOTE — TELEPHONE ENCOUNTER
ctrss done for left flank pain 7/2/18  6mm left uvj stone with mild hydro  3mm RMP stone  Diverticulosis     He says his pain improved yesterday and he thought he passed it but 6mm stone is visible on ct  scan in left uvj. He has passed an 8mm and 6mm stone before.     I sent prescriptions for flomax 0.4mg nightly to help pass stone and toradol 10mg as needed for pain. If he does not pass the stone we will do a kub and rbus 7/23/18 and ureteroscopy on 7/25/18. Orders placed for kub and rbus. Will hold off on scheduling procedure as he has passed multiple stones.    Counseled him if he has fever he needs to go to ER for stent  Please call him to schedule the xray and us for Monday July 23

## 2018-07-05 ENCOUNTER — TELEPHONE (OUTPATIENT)
Dept: UROLOGY | Facility: CLINIC | Age: 63
End: 2018-07-05

## 2018-07-05 NOTE — TELEPHONE ENCOUNTER
----- Message from Alejandra Rossi sent at 7/3/2018  2:15 PM CDT -----  Contact: Patient  Type:  Patient Returning Call    Who Called:  Patient  Who Left Message for Patient:  Bree   Does the patient know what this is regarding?: no  Best Call Back Number:    Additional Information:  Call to pod. No answer.

## 2018-07-18 ENCOUNTER — TELEPHONE (OUTPATIENT)
Dept: UROLOGY | Facility: CLINIC | Age: 63
End: 2018-07-18

## 2018-07-18 NOTE — TELEPHONE ENCOUNTER
----- Message from Sybil Tinajero sent at 7/18/2018 10:04 AM CDT -----  Contact: Patient  Patient is calling to let the office know that he passed the stone and he would like to know if he still needs to be seen and have the x-ray done.  Call Back#[phone]  Thanks

## 2018-07-18 NOTE — TELEPHONE ENCOUNTER
Spoke with patient informed him of recommendations. Patient verbally voiced understanding will keep everything as scheduled on monday

## 2018-07-18 NOTE — TELEPHONE ENCOUNTER
Yes, because he has other stones and could have passed another stone. Have him do the xray and us this week instead of next and if no stone seen at left ureterovesical junction anymore will cancel

## 2018-07-23 ENCOUNTER — HOSPITAL ENCOUNTER (OUTPATIENT)
Dept: RADIOLOGY | Facility: HOSPITAL | Age: 63
Discharge: HOME | End: 2018-07-23
Attending: UROLOGY
Payer: COMMERCIAL

## 2018-07-23 DIAGNOSIS — N20.0 NEPHROLITHIASIS: ICD-10-CM

## 2018-07-23 PROCEDURE — 74018 RADEX ABDOMEN 1 VIEW: CPT | Mod: TC,FY

## 2018-07-23 PROCEDURE — 76770 US EXAM ABDO BACK WALL COMP: CPT | Mod: 26,,, | Performed by: RADIOLOGY

## 2018-07-23 PROCEDURE — 76770 US EXAM ABDO BACK WALL COMP: CPT | Mod: TC

## 2018-07-23 PROCEDURE — 74018 RADEX ABDOMEN 1 VIEW: CPT | Mod: 26,,, | Performed by: RADIOLOGY

## 2019-06-14 ENCOUNTER — OFFICE VISIT (OUTPATIENT)
Dept: PODIATRY | Facility: CLINIC | Age: 64
End: 2019-06-14
Payer: COMMERCIAL

## 2019-06-14 ENCOUNTER — HOSPITAL ENCOUNTER (OUTPATIENT)
Dept: RADIOLOGY | Facility: CLINIC | Age: 64
Discharge: HOME | End: 2019-06-14
Attending: PODIATRIST
Payer: COMMERCIAL

## 2019-06-14 VITALS
HEART RATE: 69 BPM | BODY MASS INDEX: 35.56 KG/M2 | SYSTOLIC BLOOD PRESSURE: 114 MMHG | HEIGHT: 71 IN | WEIGHT: 254 LBS | DIASTOLIC BLOOD PRESSURE: 64 MMHG

## 2019-06-14 DIAGNOSIS — L84 CORN OR CALLUS: Primary | ICD-10-CM

## 2019-06-14 DIAGNOSIS — L84 CORN OR CALLUS: ICD-10-CM

## 2019-06-14 DIAGNOSIS — M77.9 CAPSULITIS: ICD-10-CM

## 2019-06-14 DIAGNOSIS — M24.573 EQUINUS CONTRACTURE OF ANKLE: ICD-10-CM

## 2019-06-14 DIAGNOSIS — M79.672 FOOT PAIN, LEFT: ICD-10-CM

## 2019-06-14 PROCEDURE — 3008F PR BODY MASS INDEX (BMI) DOCUMENTED: ICD-10-PCS | Mod: CPTII,S$GLB,, | Performed by: PODIATRIST

## 2019-06-14 PROCEDURE — 73630 X-RAY EXAM OF FOOT: CPT | Mod: TC,FY,PO,LT

## 2019-06-14 PROCEDURE — 99203 OFFICE O/P NEW LOW 30 MIN: CPT | Mod: 25,S$GLB,, | Performed by: PODIATRIST

## 2019-06-14 PROCEDURE — 99999 PR PBB SHADOW E&M-EST. PATIENT-LVL IV: CPT | Mod: PBBFAC,,, | Performed by: PODIATRIST

## 2019-06-14 PROCEDURE — 73630 X-RAY EXAM OF FOOT: CPT | Mod: 26,LT,S$GLB, | Performed by: RADIOLOGY

## 2019-06-14 PROCEDURE — 3008F BODY MASS INDEX DOCD: CPT | Mod: CPTII,S$GLB,, | Performed by: PODIATRIST

## 2019-06-14 PROCEDURE — 99999 PR PBB SHADOW E&M-EST. PATIENT-LVL IV: ICD-10-PCS | Mod: PBBFAC,,, | Performed by: PODIATRIST

## 2019-06-14 PROCEDURE — 73630 XR FOOT COMPLETE 3 VIEW LEFT: ICD-10-PCS | Mod: 26,LT,S$GLB, | Performed by: RADIOLOGY

## 2019-06-14 PROCEDURE — 99203 PR OFFICE/OUTPT VISIT, NEW, LEVL III, 30-44 MIN: ICD-10-PCS | Mod: 25,S$GLB,, | Performed by: PODIATRIST

## 2019-06-14 PROCEDURE — 29540 STRAPPING ANKLE &/FOOT: CPT | Mod: LT,S$GLB,, | Performed by: PODIATRIST

## 2019-06-14 PROCEDURE — 29540 PR STRAPPING; ANKLE &/OR FOOT: ICD-10-PCS | Mod: LT,S$GLB,, | Performed by: PODIATRIST

## 2019-06-14 RX ORDER — AMMONIUM LACTATE 12 G/100G
CREAM TOPICAL
Qty: 140 G | Refills: 11 | Status: SHIPPED | OUTPATIENT
Start: 2019-06-14 | End: 2019-12-12

## 2019-06-14 RX ORDER — DULAGLUTIDE 0.75 MG/.5ML
INJECTION, SOLUTION SUBCUTANEOUS
Refills: 6 | Status: ON HOLD | COMMUNITY
Start: 2019-04-22 | End: 2021-03-03 | Stop reason: HOSPADM

## 2019-06-14 NOTE — PROGRESS NOTES
Subjective:      Patient ID: Jigar Acharya is a 64 y.o. male.    Chief Complaint: Callouses (left foot bottom)    Throbbing sharp pain bottom left forefoot.  Gradual onset, worsening over past several weeks, aggravated by increased weight bearing, shoe gear, pressure.  No previous medical treatment.  OTC pain med not helping. Denies trauma, surgery.    Review of Systems   Constitution: Negative for chills, diaphoresis, fever, malaise/fatigue and night sweats.   Cardiovascular: Negative for claudication, cyanosis, leg swelling and syncope.   Skin: Positive for suspicious lesions. Negative for color change, dry skin, nail changes, rash and unusual hair distribution.   Musculoskeletal: Positive for joint pain. Negative for falls, joint swelling, muscle cramps, muscle weakness and stiffness.   Gastrointestinal: Negative for constipation, diarrhea, nausea and vomiting.   Neurological: Negative for brief paralysis, disturbances in coordination, focal weakness, numbness, paresthesias, sensory change and tremors.           Objective:      Physical Exam   Constitutional: He is oriented to person, place, and time. He appears well-developed and well-nourished. He is cooperative. No distress.   Cardiovascular:   Pulses:       Popliteal pulses are 2+ on the right side, and 2+ on the left side.        Dorsalis pedis pulses are 2+ on the right side, and 2+ on the left side.        Posterior tibial pulses are 2+ on the right side, and 2+ on the left side.   Capillary refill 3 seconds all toes/distal feet, all toes/both feet warm to touch.      Negative lymphadenopathy bilateral popliteal fossa and tarsal tunnel.      Negavie lower extremity edema bilateral.     Musculoskeletal:        Right ankle: He exhibits normal range of motion, no swelling, no ecchymosis, no deformity, no laceration and normal pulse. Achilles tendon normal. Achilles tendon exhibits no pain, no defect and normal Farah's test results.   Pain to palpation  inferior mtpj 2 left without evidence of trauma or infection.    Ankle dorsiflexion decreased at <10 degrees bilateral with moderate increase with knee flexion bilateral.    Otherwise ,Normal angle, base, station of gait. All ten toes without clubbing, cyanosis, or signs of ischemia.  No pain to palpation bilateral lower extremities.  Range of motion, stability, muscle strength, and muscle tone normal bilateral feet and legs.    Lymphadenopathy: No inguinal adenopathy noted on the right or left side.   Negative lymphadenopathy bilateral popliteal fossa and tarsal tunnel.    Negative lymphangitic streaking bilateral feet/ankles/legs.   Neurological: He is alert and oriented to person, place, and time. He has normal strength. He displays no atrophy and no tremor. No sensory deficit. He exhibits normal muscle tone. Gait normal.   Reflex Scores:       Patellar reflexes are 2+ on the right side and 2+ on the left side.       Achilles reflexes are 2+ on the right side and 2+ on the left side.  Negative tinel sign to percussion sural, superficial peroneal, deep peroneal, saphenous, and posterior tibial nerves right and left ankles and feet.      Negative moulder sign/click bilateral all intermetatarsal spaces.    Skin: Skin is warm, dry and intact. Capillary refill takes 2 to 3 seconds. No abrasion, no bruising, no burn, no ecchymosis, no laceration, no lesion and no rash noted. He is not diaphoretic. No cyanosis or erythema. No pallor. Nails show no clubbing.     Focal hyperkeratotic lesion consisting entirely of hyperkeratotic tissue without open skin, drainage, pus, fluctuance, malodor, or signs of infection plantar let 2nd mtpj.    Otherwise, Skin is normal age and health appropriate color, turgor, texture, and temperature bilateral lower extremities without ulceration, hyperpigmentation, discoloration, masses nodules or cords palpated.  No ecchymosis, erythema, edema, or cardinal signs of infection bilateral lower  extremities.     Psychiatric: He has a normal mood and affect.             Assessment:       Encounter Diagnoses   Name Primary?    Corn or callus Yes    Capsulitis     Foot pain, left     Equinus contracture of ankle          Plan:       Jigar was seen today for callouses.    Diagnoses and all orders for this visit:    Corn or callus  -     X-Ray Foot Complete Left; Future    Capsulitis  -     X-Ray Foot Complete Left; Future    Foot pain, left  -     X-Ray Foot Complete Left; Future    Equinus contracture of ankle  -     X-Ray Foot Complete Left; Future    Other orders  -     ammonium lactate 12 % Crea; Apply twice daily to affected parts both feet as needed.      I counseled the patient on his conditions, their implications and medical management.        Patient will stretch the tendo achilles complex three times daily as demonstrated in the office.  Literature was dispensed illustrating proper stretching technique.    I applied a plantar rest strapping to the patient's left foot to offload symptomatic area, support the arch, and relieve pain.    Patient will obtain over the counter arch supports and wear them in shoes whenever possible.  Athletic shoes intended for walking or running are usually best.    Discussed conservative treatment with shoes of adequate dimensions, material, and style to alleviate symptoms and delay or prevent surgical intervention.    Rx xrays, lac hydrin.          Follow up in about 1 month (around 7/14/2019).

## 2019-12-18 PROBLEM — Z12.11 COLON CANCER SCREENING: Status: ACTIVE | Noted: 2019-12-18

## 2020-02-26 ENCOUNTER — HOSPITAL ENCOUNTER (EMERGENCY)
Facility: HOSPITAL | Age: 65
Discharge: HOME | End: 2020-02-26
Attending: EMERGENCY MEDICINE
Payer: COMMERCIAL

## 2020-02-26 VITALS
OXYGEN SATURATION: 94 % | WEIGHT: 250 LBS | DIASTOLIC BLOOD PRESSURE: 72 MMHG | SYSTOLIC BLOOD PRESSURE: 141 MMHG | HEART RATE: 77 BPM | RESPIRATION RATE: 16 BRPM | BODY MASS INDEX: 35 KG/M2 | HEIGHT: 71 IN | TEMPERATURE: 99 F

## 2020-02-26 DIAGNOSIS — R73.9 HYPERGLYCEMIA: ICD-10-CM

## 2020-02-26 DIAGNOSIS — K85.90 ACUTE PANCREATITIS, UNSPECIFIED COMPLICATION STATUS, UNSPECIFIED PANCREATITIS TYPE: ICD-10-CM

## 2020-02-26 DIAGNOSIS — R10.31 RIGHT LOWER QUADRANT ABDOMINAL PAIN: Primary | ICD-10-CM

## 2020-02-26 DIAGNOSIS — R10.9 ABDOMINAL PAIN: ICD-10-CM

## 2020-02-26 LAB
ALBUMIN SERPL BCP-MCNC: 4.4 G/DL (ref 3.5–5.2)
ALP SERPL-CCNC: 52 U/L (ref 55–135)
ALT SERPL W/O P-5'-P-CCNC: 17 U/L (ref 10–44)
ANION GAP SERPL CALC-SCNC: 14 MMOL/L (ref 8–16)
AST SERPL-CCNC: 19 U/L (ref 10–40)
BACTERIA #/AREA URNS HPF: NEGATIVE /HPF
BASOPHILS # BLD AUTO: 0.02 K/UL (ref 0–0.2)
BASOPHILS NFR BLD: 0.2 % (ref 0–1.9)
BILIRUB SERPL-MCNC: 1.2 MG/DL (ref 0.1–1)
BILIRUB UR QL STRIP: NEGATIVE
BUN SERPL-MCNC: 8 MG/DL (ref 8–23)
CALCIUM SERPL-MCNC: 9.7 MG/DL (ref 8.7–10.5)
CHLORIDE SERPL-SCNC: 96 MMOL/L (ref 95–110)
CLARITY UR: CLEAR
CO2 SERPL-SCNC: 27 MMOL/L (ref 23–29)
COLOR UR: YELLOW
CREAT SERPL-MCNC: 0.8 MG/DL (ref 0.5–1.4)
DIFFERENTIAL METHOD: ABNORMAL
EOSINOPHIL # BLD AUTO: 0.1 K/UL (ref 0–0.5)
EOSINOPHIL NFR BLD: 1 % (ref 0–8)
ERYTHROCYTE [DISTWIDTH] IN BLOOD BY AUTOMATED COUNT: 11.9 % (ref 11.5–14.5)
EST. GFR  (AFRICAN AMERICAN): >60 ML/MIN/1.73 M^2
EST. GFR  (NON AFRICAN AMERICAN): >60 ML/MIN/1.73 M^2
GLUCOSE SERPL-MCNC: 232 MG/DL (ref 70–110)
GLUCOSE UR QL STRIP: ABNORMAL
HCT VFR BLD AUTO: 42.6 % (ref 40–54)
HGB BLD-MCNC: 14.8 G/DL (ref 14–18)
HGB UR QL STRIP: NEGATIVE
HYALINE CASTS #/AREA URNS LPF: 0 /LPF
IMM GRANULOCYTES # BLD AUTO: 0.04 K/UL (ref 0–0.04)
IMM GRANULOCYTES NFR BLD AUTO: 0.3 % (ref 0–0.5)
KETONES UR QL STRIP: ABNORMAL
LEUKOCYTE ESTERASE UR QL STRIP: NEGATIVE
LIPASE SERPL-CCNC: 62 U/L (ref 4–60)
LYMPHOCYTES # BLD AUTO: 1.6 K/UL (ref 1–4.8)
LYMPHOCYTES NFR BLD: 13.6 % (ref 18–48)
MCH RBC QN AUTO: 32.5 PG (ref 27–31)
MCHC RBC AUTO-ENTMCNC: 34.7 G/DL (ref 32–36)
MCV RBC AUTO: 93 FL (ref 82–98)
MICROSCOPIC COMMENT: ABNORMAL
MONOCYTES # BLD AUTO: 1 K/UL (ref 0.3–1)
MONOCYTES NFR BLD: 8.9 % (ref 4–15)
NEUTROPHILS # BLD AUTO: 8.8 K/UL (ref 1.8–7.7)
NEUTROPHILS NFR BLD: 76 % (ref 38–73)
NITRITE UR QL STRIP: NEGATIVE
NRBC BLD-RTO: 0 /100 WBC
PH UR STRIP: 7 [PH] (ref 5–8)
PLATELET # BLD AUTO: 238 K/UL (ref 150–350)
PMV BLD AUTO: 10.9 FL (ref 9.2–12.9)
POTASSIUM SERPL-SCNC: 4 MMOL/L (ref 3.5–5.1)
PROT SERPL-MCNC: 8.1 G/DL (ref 6–8.4)
PROT UR QL STRIP: NEGATIVE
RBC # BLD AUTO: 4.56 M/UL (ref 4.6–6.2)
RBC #/AREA URNS HPF: 1 /HPF (ref 0–4)
SODIUM SERPL-SCNC: 137 MMOL/L (ref 136–145)
SP GR UR STRIP: 1.01 (ref 1–1.03)
SQUAMOUS #/AREA URNS HPF: 0 /HPF
URN SPEC COLLECT METH UR: ABNORMAL
UROBILINOGEN UR STRIP-ACNC: NEGATIVE EU/DL
WBC # BLD AUTO: 11.57 K/UL (ref 3.9–12.7)
WBC #/AREA URNS HPF: 0 /HPF (ref 0–5)
YEAST URNS QL MICRO: ABNORMAL

## 2020-02-26 PROCEDURE — 96361 HYDRATE IV INFUSION ADD-ON: CPT

## 2020-02-26 PROCEDURE — 93005 ELECTROCARDIOGRAM TRACING: CPT

## 2020-02-26 PROCEDURE — 80053 COMPREHEN METABOLIC PANEL: CPT

## 2020-02-26 PROCEDURE — 63600175 PHARM REV CODE 636 W HCPCS: Performed by: EMERGENCY MEDICINE

## 2020-02-26 PROCEDURE — 81001 URINALYSIS AUTO W/SCOPE: CPT

## 2020-02-26 PROCEDURE — 96375 TX/PRO/DX INJ NEW DRUG ADDON: CPT

## 2020-02-26 PROCEDURE — 85025 COMPLETE CBC W/AUTO DIFF WBC: CPT

## 2020-02-26 PROCEDURE — 25500020 PHARM REV CODE 255: Performed by: EMERGENCY MEDICINE

## 2020-02-26 PROCEDURE — 99285 EMERGENCY DEPT VISIT HI MDM: CPT | Mod: 25

## 2020-02-26 PROCEDURE — 96374 THER/PROPH/DIAG INJ IV PUSH: CPT

## 2020-02-26 PROCEDURE — 83690 ASSAY OF LIPASE: CPT

## 2020-02-26 RX ORDER — MORPHINE SULFATE 4 MG/ML
6 INJECTION, SOLUTION INTRAMUSCULAR; INTRAVENOUS
Status: COMPLETED | OUTPATIENT
Start: 2020-02-26 | End: 2020-02-26

## 2020-02-26 RX ORDER — HYDROCODONE BITARTRATE AND ACETAMINOPHEN 5; 325 MG/1; MG/1
1 TABLET ORAL EVERY 6 HOURS PRN
Qty: 12 TABLET | Refills: 0 | Status: ON HOLD | OUTPATIENT
Start: 2020-02-26 | End: 2021-03-03 | Stop reason: HOSPADM

## 2020-02-26 RX ORDER — DOCUSATE SODIUM 100 MG/1
100 CAPSULE, LIQUID FILLED ORAL 2 TIMES DAILY PRN
Qty: 30 CAPSULE | Refills: 0 | Status: ON HOLD | OUTPATIENT
Start: 2020-02-26 | End: 2021-03-03 | Stop reason: HOSPADM

## 2020-02-26 RX ORDER — ONDANSETRON 2 MG/ML
4 INJECTION INTRAMUSCULAR; INTRAVENOUS
Status: COMPLETED | OUTPATIENT
Start: 2020-02-26 | End: 2020-02-26

## 2020-02-26 RX ORDER — ONDANSETRON 4 MG/1
4 TABLET, FILM COATED ORAL EVERY 6 HOURS PRN
Qty: 10 TABLET | Refills: 0 | Status: SHIPPED | OUTPATIENT
Start: 2020-02-26 | End: 2023-05-03

## 2020-02-26 RX ADMIN — MORPHINE SULFATE 6 MG: 4 INJECTION INTRAVENOUS at 06:02

## 2020-02-26 RX ADMIN — SODIUM CHLORIDE 1000 ML: 9 INJECTION, SOLUTION INTRAVENOUS at 06:02

## 2020-02-26 RX ADMIN — IOHEXOL 100 ML: 350 INJECTION, SOLUTION INTRAVENOUS at 07:02

## 2020-02-26 RX ADMIN — ONDANSETRON 4 MG: 2 INJECTION INTRAMUSCULAR; INTRAVENOUS at 06:02

## 2020-02-26 NOTE — ED PROVIDER NOTES
Encounter Date: 2/26/2020       History     Chief Complaint   Patient presents with    Abdominal Pain     constipation / abdominal pain (right)     HPI   65-year-old male with past medical history significant for coronary artery disease, diabetes, diverticulitis, GERD, high cholesterol, hypothyroidism, history of kidney stones, hypothyroidism struck to sleep apnea presents to the ER with abdominal pain. Patient states the abdominal pain started approximately 4 days ago is located in his right lower quadrant.  Patient describes an aching sensation that radiates to his flank and is made worse with movement or palpation.  Patient describes nausea and constipation.  Patient denies fevers, chills, urinary symptoms, vomiting, shortness of breath or chest pain. Patient states he has not taken anything for the discomfort.  Review of patient's allergies indicates:   Allergen Reactions    Demerol [meperidine] Other (See Comments)     Low Blood Pressure     Past Medical History:   Diagnosis Date    Coronary artery disease 2004    Diabetes mellitus, type 2     Diverticulitis     GERD (gastroesophageal reflux disease)     Hyperlipidemia     Hypertension     Hypothyroidism 2003    Kidney stones     Sleep apnea      Past Surgical History:   Procedure Laterality Date    COLONOSCOPY  12/18/2019    COLONOSCOPY N/A 12/18/2019    Procedure: COLONOSCOPY;  Surgeon: Esequiel Eastman MD;  Location: [facility];  Service: Endoscopy;  Laterality: N/A;    Coronary Angiography  01/01/2004    CORONARY STENT PLACEMENT      HERNIA REPAIR      Inguinal     Family History   Problem Relation Age of Onset    No Known Problems Mother     No Known Problems Father     Colon cancer Paternal Aunt     Heart disease Maternal Grandfather      Social History     Tobacco Use    Smoking status: Former Smoker    Smokeless tobacco: Never Used   Substance Use Topics    Alcohol use: No    Drug use: Not on file     Review of Systems    Constitutional: Negative for appetite change, chills, diaphoresis and fever.   HENT: Negative for ear pain, rhinorrhea, sore throat, trouble swallowing and voice change.    Eyes: Negative for pain, discharge, redness and visual disturbance.   Respiratory: Negative for cough, chest tightness and shortness of breath.    Cardiovascular: Negative for chest pain and leg swelling.   Gastrointestinal: Positive for abdominal pain, constipation and nausea. Negative for diarrhea and vomiting.   Genitourinary: Negative for difficulty urinating, dysuria, flank pain, frequency and urgency.   Musculoskeletal: Negative for arthralgias, back pain and myalgias.   Skin: Negative for rash.   Neurological: Negative for dizziness, syncope, speech difficulty, weakness, light-headedness, numbness and headaches.       Physical Exam     Initial Vitals [02/26/20 0455]   BP Pulse Resp Temp SpO2   (!) 177/79 70 16 98.1 °F (36.7 °C) 99 %      MAP       --         Physical Exam    Nursing note and vitals reviewed.  Constitutional: He appears well-developed and well-nourished. He is not diaphoretic. He is cooperative.  Non-toxic appearance. He does not have a sickly appearance. He does not appear ill. No distress.   HENT:   Head: Normocephalic and atraumatic. Head is without abrasion, without right periorbital erythema and without left periorbital erythema.   Right Ear: Hearing and external ear normal. No drainage or tenderness.   Left Ear: Hearing and external ear normal. No drainage or tenderness.   Nose: No rhinorrhea, sinus tenderness or nasal septal hematoma. No epistaxis.  No foreign bodies. Right sinus exhibits no frontal sinus tenderness. Left sinus exhibits no frontal sinus tenderness.   Mouth/Throat: Uvula is midline and mucous membranes are normal. No oral lesions. No trismus in the jaw. No dental abscesses or uvula swelling. No oropharyngeal exudate, posterior oropharyngeal edema, posterior oropharyngeal erythema or tonsillar  abscesses.   Eyes: Lids are normal. Pupils are equal, round, and reactive to light. Right eye exhibits no chemosis, no discharge and no exudate. Left eye exhibits no chemosis, no discharge and no exudate. Right conjunctiva is not injected. Right conjunctiva has no hemorrhage. Left conjunctiva is not injected. Left conjunctiva has no hemorrhage. No scleral icterus. Right eye exhibits normal extraocular motion. Left eye exhibits normal extraocular motion.   Neck: Trachea normal and normal range of motion. Neck supple. No stridor present. No spinous process tenderness and no muscular tenderness present. No tracheal deviation and no edema present. No neck rigidity. No JVD present.   Cardiovascular: Regular rhythm, normal heart sounds and normal pulses.   Pulmonary/Chest: Breath sounds normal.   Abdominal: Soft. Bowel sounds are normal. He exhibits distension. He exhibits no ascites and no mass. There is no hepatosplenomegaly. There is tenderness. There is no rigidity, no rebound, no guarding and no CVA tenderness. Hernia confirmed negative in the ventral area.       Musculoskeletal: Normal range of motion.   Lymphadenopathy:     He has no cervical adenopathy.   Neurological: He is alert. He has normal strength. No cranial nerve deficit or sensory deficit.   Skin: Skin is warm. Capillary refill takes less than 2 seconds. No ecchymosis, no lesion and no rash noted. No erythema.   Psychiatric: He has a normal mood and affect. His speech is normal and behavior is normal. Judgment and thought content normal.         ED Course   Procedures  Labs Reviewed   CBC W/ AUTO DIFFERENTIAL - Abnormal; Notable for the following components:       Result Value    RBC 4.56 (*)     Mean Corpuscular Hemoglobin 32.5 (*)     Gran # (ANC) 8.8 (*)     Gran% 76.0 (*)     Lymph% 13.6 (*)     All other components within normal limits   COMPREHENSIVE METABOLIC PANEL - Abnormal; Notable for the following components:    Glucose 232 (*)     Total  Bilirubin 1.2 (*)     Alkaline Phosphatase 52 (*)     All other components within normal limits   LIPASE - Abnormal; Notable for the following components:    Lipase 62 (*)     All other components within normal limits   URINALYSIS, REFLEX TO URINE CULTURE - Abnormal; Notable for the following components:    Glucose, UA 4+ (*)     Ketones, UA 1+ (*)     All other components within normal limits    Narrative:     Preferred Collection Type->Urine, Clean Catch  Specimen Source->Urine   URINALYSIS MICROSCOPIC - Abnormal; Notable for the following components:    Hyaline Casts, UA 0.00 (*)     All other components within normal limits    Narrative:     Preferred Collection Type->Urine, Clean Catch  Specimen Source->Urine     EKG Readings: (Independently Interpreted)   Normal sinus rhythm at 69.  P are interval 164, , .  Incomplete bundle branch block noted. Otherwise nonischemic EKG     ECG Results          EKG 12-lead (Final result)  Result time 02/26/20 20:20:24    Final result by Interface, Lab In [facility] (02/26/20 20:20:24)                 Narrative:    Test Reason : R10.9,    Vent. Rate : 069 BPM     Atrial Rate : 069 BPM     P-R Int : 164 ms          QRS Dur : 112 ms      QT Int : 426 ms       P-R-T Axes : 053 -11 020 degrees     QTc Int : 456 ms    Normal sinus rhythm  Incomplete right bundle branch block  Borderline Abnormal ECG  No previous ECGs available  Confirmed by Fransisco Bueno MD ([id number]) on 2/26/2020 8:20:20 PM    Referred By: [email or web address]   SELF           Confirmed By:Fransisco Bueno MD                            Imaging Results          CT Abdomen Pelvis With Contrast (Final result)  Result time 02/26/20 08:17:05    Final result by Florentin Moses IV, MD (02/26/20 08:17:05)                 Impression:      Focal area of altered enhancement involving the pancreatic head/uncinate process with adjacent peripancreatic inflammatory changes.  This appearance could be on the basis of acute  pancreatitis.  The possibility of underlying pancreatic neoplasm is not excluded.  Short-term follow-up is warranted with multiphasic CT with attention to the pancreas in 2-4 week interval.  These findings and follow-up recommendations were discussed with Dr. Kaminski in the emergency department at the time of the interpretation.    Nonobstructing left lower pole renal calculus.    Fatty infiltration of the liver.    Additional observations as above.      Electronically signed by: Florentin Moses IV., MD  Date:    02/26/2020  Time:    08:17             Narrative:      CMS MANDATED QUALITY DATA - CT RADIATION - 436    All CT scans at this facility utilize dose modulation, iterative reconstruction, and/or weight based dosing when appropriate to reduce radiation dose to as low as reasonably achievable.    EXAMINATION:  CT ABDOMEN PELVIS WITH CONTRAST    CLINICAL HISTORY:  RLQ pain, appendicitis suspected;    TECHNIQUE:  The examination utilizes 100 cc of intravenous nonionic contrast material.    COMPARISON:  None.    FINDINGS:  There are mild inflammatory changes within the peripancreatic fat adjacent to the head of the pancreas.  There is a subtle area of altered enhancement involving the pancreatic head/uncinate process.  These findings may represent acute pancreatitis with possible developing pancreatic necrosis.  The possibility of a small underlying pancreatic neoplasm however is not excluded.  Short-term follow-up is recommended with repeat multiphasic CT in 2-4 week interval.    There are mild changes of diffuse decrease in hepatic attenuation compatible with fatty infiltration.  No focal hepatic parenchymal abnormality is observed.  The spleen is unremarkable.  The gallbladder, biliary system and adrenal glands are unremarkable.  The kidneys are normal in size and position.  There are no suspicious renal masses or hydronephrosis.  There is a 4 mm nonobstructing left lower pole renal calculus.    Scattered  atheromatous changes are present in the wall of the abdominal aorta and branches without aneurysmal dilatation.  There is no bowel wall thickening or evidence of obstruction.  No adenopathy or significant free fluid is observed.    The urinary bladder is unopacified but grossly unremarkable.  There are no pelvic masses.  There are small bilateral fat containing inguinal hernias.    The visualized lung bases are appropriately aerated.  No acute osseous abnormality is observed.                                 Medical Decision Making:   Initial Assessment:   Utilizing ultrasound guidance an 18 gauge IV catheter was placed in patient's right antecubital fossa                   ED Course as of Feb 27 0329   Wed Feb 26, 2020   0648 Patient states his pain is 0/10 after morphine administration.    [MP]   0649 Dr. Kaminski bedside for sign-out and evaluated patient.  CT scan pending.    [MP]      ED Course User Index  [MP] Ra Nicole MD                Clinical Impression:       ICD-10-CM ICD-9-CM   1. Right lower quadrant abdominal pain R10.31 789.03   2. Abdominal pain R10.9 789.00   3. Hyperglycemia R73.9 790.29   4. Acute pancreatitis, unspecified complication status, unspecified pancreatitis type K85.90 577.0             ED Disposition Condition    Discharge Stable        ED Prescriptions     Medication Sig Dispense Start Date End Date Auth. Provider    HYDROcodone-acetaminophen (NORCO) 5-325 mg per tablet Take 1 tablet by mouth every 6 (six) hours as needed. 12 tablet 2/26/2020  Dominic Kaminski MD    docusate sodium (COLACE) 100 MG capsule Take 1 capsule (100 mg total) by mouth 2 (two) times daily as needed for Constipation. 30 capsule 2/26/2020  Dominic Kaminski MD    ondansetron (ZOFRAN) 4 MG tablet Take 1 tablet (4 mg total) by mouth every 6 (six) hours as needed for Nausea. 10 tablet 2/26/2020  Dominic Kaminski MD        Follow-up Information     Follow up With Specialties Details Why Contact Info    Esequiel Collins   In 1 week                                       Ra Nicole MD  02/27/20 032

## 2020-02-26 NOTE — DISCHARGE INSTRUCTIONS
Imaging today reveals findings of acute pancreatitis.  It is important to follow up for more advanced imaging of the pancreas to rule out a mass. Clear liquid diet for the next 24-48 hours.  Advanced to broth.  Avoid high lipid diet.

## 2020-02-26 NOTE — ED NOTES
LOC: The patient is awake, alert and aware of environment with an appropriate affect, the patient is oriented x 3 and speaking appropriately.  APPEARANCE: Patient resting comfortably and in no acute distress, patient is clean and well groomed, patient's clothing properly fastened.  SKIN: The skin is warm and dry, color consistent with ethnicity, patient has normal skin turgor and moist mucus membranes, skin intact, no breakdown or brusing noted.  MUSKULOSKELETAL: Patient moving all extremities well, no obvious swelling or deformities noted.  RESPIRATORY: Airway is open and patent, respirations are spontaneous, patient has a normal effort and rate, no accessory muscle use noted.  CARDIAC: Patient has a normal rate and rhythm, no peripheral edema noted, capillary refill < 3 seconds.  ABDOMEN: Soft and tender to palpation, no distention noted.  NEUROLOGIC: PERRL, 3mm bilaterally, eyes open spontaneously, behavior appropriate to situation, follows commands, facial expression symmetrical, bilateral hand grasp equal and even, purposeful motor response noted, normal sensation in all extremities when touched with a finger.

## 2020-03-04 NOTE — PROVIDER PROGRESS NOTES - EMERGENCY DEPT.
Encounter Date: 2/26/2020    ED Physician Progress Notes        Physician Note:   Imaging reveals findings suggestive of acute pancreatitis.  Lipase not extremely elevated.  Patient tolerating by mouth.  Advised clear liquid diet and advance to broth.  Advised patient will need further evaluation of pancreas to rule out a mass.  At discharge patient in stable condition.  Detailed return precautions discussed.

## 2020-07-14 ENCOUNTER — OFFICE VISIT (OUTPATIENT)
Dept: PRIMARY CARE CLINIC | Facility: CLINIC | Age: 65
End: 2020-07-14
Payer: COMMERCIAL

## 2020-07-14 VITALS
SYSTOLIC BLOOD PRESSURE: 127 MMHG | TEMPERATURE: 98 F | DIASTOLIC BLOOD PRESSURE: 68 MMHG | RESPIRATION RATE: 16 BRPM | OXYGEN SATURATION: 96 % | HEART RATE: 68 BPM

## 2020-07-14 DIAGNOSIS — U07.1 COVID-19: Primary | ICD-10-CM

## 2020-07-14 DIAGNOSIS — R05.9 COUGH: ICD-10-CM

## 2020-07-14 PROCEDURE — 99203 OFFICE O/P NEW LOW 30 MIN: CPT | Mod: S$GLB,,, | Performed by: EMERGENCY MEDICINE

## 2020-07-14 PROCEDURE — 99203 PR OFFICE/OUTPT VISIT, NEW, LEVL III, 30-44 MIN: ICD-10-PCS | Mod: S$GLB,,, | Performed by: EMERGENCY MEDICINE

## 2020-07-14 PROCEDURE — U0003 INFECTIOUS AGENT DETECTION BY NUCLEIC ACID (DNA OR RNA); SEVERE ACUTE RESPIRATORY SYNDROME CORONAVIRUS 2 (SARS-COV-2) (CORONAVIRUS DISEASE [COVID-19]), AMPLIFIED PROBE TECHNIQUE, MAKING USE OF HIGH THROUGHPUT TECHNOLOGIES AS DESCRIBED BY CMS-2020-01-R: HCPCS

## 2020-07-14 NOTE — PROGRESS NOTES
Subjective:        Time seen by provider: 9:11 AM on 07/14/2020    Jigar Acharya is a 65 y.o. male with PMHx of CAD, DM, HLD, and HTN who presents for an evaluation of possible COVID-19. The patient c/o congestion and runny nose x 2 weeks. He denies fever or any other symptoms at this time. Patient reports exposure to an individual who tested positive for COVID-19 yesterday. The individual has been living with patient and his family over the last 2 weeks. No pertinent PSHx. Patient is a former smoker.    Review of Systems   Constitutional: Negative for activity change, appetite change, fatigue and fever.   HENT: Positive for congestion and rhinorrhea. Negative for sore throat.    Respiratory: Negative for cough, chest tightness, shortness of breath and wheezing.    Cardiovascular: Negative for chest pain and palpitations.   Gastrointestinal: Negative for diarrhea, nausea and vomiting.   Musculoskeletal: Negative for arthralgias and myalgias.   Skin: Negative for rash.   Neurological: Negative for weakness, light-headedness and headaches.       Objective:      Physical Exam  Vitals signs and nursing note reviewed.   Constitutional:       General: He is not in acute distress.     Appearance: He is well-developed. He is not diaphoretic.   HENT:      Head: Normocephalic and atraumatic.      Nose: Nose normal.   Eyes:      Conjunctiva/sclera: Conjunctivae normal.   Neck:      Musculoskeletal: Normal range of motion.   Cardiovascular:      Rate and Rhythm: Normal rate and regular rhythm.      Heart sounds: Normal heart sounds. No murmur.   Pulmonary:      Effort: Pulmonary effort is normal. No respiratory distress.      Breath sounds: Normal breath sounds. No wheezing.   Musculoskeletal: Normal range of motion.   Skin:     General: Skin is warm and dry.   Neurological:      Mental Status: He is alert and oriented to person, place, and time.         Assessment and Plan:      Diagnoses and all orders for this  visit:    COVID-19  -     COVID-19 Routine Screening  - Discharge home and await results.   - Return to clinic or ED for new or worsening symptoms.   - Follow-up with PCP as needed.     Scribe Attestation:   I, Katlyn Gentile, am scribing for, and in the presence of, Erin Esquivel PA-C. I performed the above scribed service and the documentation accurately describes the services I performed. I attest to the accuracy of the note.    I, Erin Esquivel PA-C, personally performed the services described in this documentation. All medical record entries made by the scribe were at my direction and in my presence.  I have reviewed the chart and agree that the record reflects my personal performance and is accurate and complete. Erin Esquivel PA-C.  10:49 AM 07/14/2020

## 2020-07-16 DIAGNOSIS — U07.1 COVID-19 VIRUS DETECTED: ICD-10-CM

## 2020-07-16 LAB — SARS-COV-2 RNA RESP QL NAA+PROBE: DETECTED

## 2020-09-10 ENCOUNTER — OFFICE VISIT (OUTPATIENT)
Dept: CARDIOLOGY | Facility: CLINIC | Age: 65
End: 2020-09-10
Payer: COMMERCIAL

## 2020-09-10 VITALS
OXYGEN SATURATION: 98 % | HEIGHT: 71 IN | RESPIRATION RATE: 16 BRPM | SYSTOLIC BLOOD PRESSURE: 138 MMHG | DIASTOLIC BLOOD PRESSURE: 78 MMHG | WEIGHT: 254 LBS | BODY MASS INDEX: 35.56 KG/M2 | HEART RATE: 72 BPM

## 2020-09-10 DIAGNOSIS — I10 ESSENTIAL HYPERTENSION: ICD-10-CM

## 2020-09-10 DIAGNOSIS — I25.10 CORONARY ARTERY DISEASE INVOLVING NATIVE CORONARY ARTERY WITHOUT ANGINA PECTORIS, UNSPECIFIED WHETHER NATIVE OR TRANSPLANTED HEART: Primary | ICD-10-CM

## 2020-09-10 DIAGNOSIS — E03.9 ACQUIRED HYPOTHYROIDISM: ICD-10-CM

## 2020-09-10 DIAGNOSIS — E78.2 MODERATE MIXED HYPERLIPIDEMIA NOT REQUIRING STATIN THERAPY: ICD-10-CM

## 2020-09-10 PROCEDURE — 3008F BODY MASS INDEX DOCD: CPT | Mod: CPTII,S$GLB,, | Performed by: INTERNAL MEDICINE

## 2020-09-10 PROCEDURE — 99212 OFFICE O/P EST SF 10 MIN: CPT | Mod: S$GLB,,, | Performed by: INTERNAL MEDICINE

## 2020-09-10 PROCEDURE — 99212 PR OFFICE/OUTPT VISIT, EST, LEVL II, 10-19 MIN: ICD-10-PCS | Mod: S$GLB,,, | Performed by: INTERNAL MEDICINE

## 2020-09-10 PROCEDURE — 3008F PR BODY MASS INDEX (BMI) DOCUMENTED: ICD-10-PCS | Mod: CPTII,S$GLB,, | Performed by: INTERNAL MEDICINE

## 2020-09-10 RX ORDER — LOSARTAN POTASSIUM 50 MG/1
50 TABLET ORAL DAILY
Qty: 90 TABLET | Refills: 3 | Status: SHIPPED | OUTPATIENT
Start: 2020-09-10 | End: 2021-10-08 | Stop reason: SDUPTHER

## 2020-09-10 NOTE — PROGRESS NOTES
"Subjective:    Patient ID:  Jigar Acharya is a 65 y.o. male who presents for   Follow-up (pcp done labs should be in chart )    HPI he is here for routine follow-up visit.  Not experiencing any cardiac symptoms.  On 07/14/2020 he had a visit with the PCP after which patient had a COVID test.  The result is reported as "detected", however patient reports he did not have any symptoms and tells me he does not think he had a COVID infection.  He recently had pterygium removed from his right eye and was told by the eye doctor his eyes are doing well.  He has no exertional angina.    Review of patient's allergies indicates:   Allergen Reactions    Demerol [meperidine] Other (See Comments)     Low Blood Pressure       Past Medical History:   Diagnosis Date    Coronary artery disease 2004    Diabetes mellitus, type 2     Diverticulitis     GERD (gastroesophageal reflux disease)     Hyperlipidemia     Hypertension     Hypothyroidism 2003    Kidney stones     Sleep apnea      Past Surgical History:   Procedure Laterality Date    COLONOSCOPY  12/18/2019    COLONOSCOPY N/A 12/18/2019    Procedure: COLONOSCOPY;  Surgeon: Esequiel Eastman MD;  Location: [facility];  Service: Endoscopy;  Laterality: N/A;    Coronary Angiography  01/01/2004    CORONARY STENT PLACEMENT      HERNIA REPAIR      Inguinal     Social History     Tobacco Use    Smoking status: Former Smoker    Smokeless tobacco: Never Used   Substance Use Topics    Alcohol use: No    Drug use: Not on file        Review of Systems     Review of Systems   Constitution: Negative for weight gain and weight loss.   HENT: Negative for congestion, nosebleeds and sore throat.    Eyes: Negative for visual disturbance.   Cardiovascular: Negative for chest pain, dyspnea on exertion, palpitations and syncope.   Respiratory: Negative for cough, shortness of breath and wheezing.    Skin: Negative for rash.   Musculoskeletal: Negative for back pain, gout, joint " pain and myalgias.   Gastrointestinal: Negative for heartburn, hematochezia and nausea.   Genitourinary: Negative for frequency, hematuria and nocturia.   Neurological: Negative for dizziness and tremors.   Psychiatric/Behavioral: Negative for memory loss.   Allergic/Immunologic: Negative for environmental allergies (Seasonal Allergies).           Objective:        Vitals:    09/10/20 1455   BP: 138/78   Pulse: 72   Resp: 16       Lab Results   Component Value Date    WBC 11.57 02/26/2020    HGB 14.8 02/26/2020    HCT 42.6 02/26/2020     02/26/2020    ALT 17 02/26/2020    AST 19 02/26/2020     02/26/2020    K 4.0 02/26/2020    CL 96 02/26/2020    CREATININE 0.8 02/26/2020    BUN 8 02/26/2020    CO2 27 02/26/2020    PSA 0.84 03/27/2018        ECHOCARDIOGRAM RESULTS  No results found for this or any previous visit.    CURRENT/PREVIOUS VISIT EKG  Results for orders placed or performed during the hospital encounter of 02/26/20   EKG 12-lead    Collection Time: 02/26/20  6:17 AM    Narrative    Test Reason : R10.9,    Vent. Rate : 069 BPM     Atrial Rate : 069 BPM     P-R Int : 164 ms          QRS Dur : 112 ms      QT Int : 426 ms       P-R-T Axes : 053 -11 020 degrees     QTc Int : 456 ms    Normal sinus rhythm  Incomplete right bundle branch block  Borderline Abnormal ECG  No previous ECGs available  Confirmed by Fransisco Bueno MD ([id number]) on 2/26/2020 8:20:20 PM    Referred By: [email or web address]   SELF           Confirmed By:Fransisco Bueno MD     No results found for this or any previous visit.  No results found for this or any previous visit.    PHYSICAL EXAM    Physical Exam   Constitutional: Vital signs are normal. He appears well-developed.   Overweight gentleman in no distress   Eyes: Conjunctivae are normal.   Neck: No JVD present. Carotid bruit is not present.   Cardiovascular: Normal rate, regular rhythm and normal heart sounds.  No extrasystoles are present. Exam reveals no gallop.   No murmur  heard.  Pulmonary/Chest: Effort normal and breath sounds normal.   Neurological:   Moderately hard of hearing.  Not wearing his hearing aids today        Medication List with Changes/Refills   Current Medications    ASPIRIN 325 MG TABLET    Take 325 mg by mouth once daily.    DOCUSATE SODIUM (COLACE) 100 MG CAPSULE    Take 1 capsule (100 mg total) by mouth 2 (two) times daily as needed for Constipation.    ESCITALOPRAM OXALATE (LEXAPRO) 10 MG TABLET    TK 1 T PO QD    HYDROCODONE-ACETAMINOPHEN (NORCO) 5-325 MG PER TABLET    Take 1 tablet by mouth every 6 (six) hours as needed.    LEVOTHYROXINE (SYNTHROID) 150 MCG TABLET    1 T D    METFORMIN (GLUCOPHAGE-XR) 500 MG 24 HR TABLET    TK 2 TS PO BID    METOPROLOL SUCCINATE (TOPROL-XL) 50 MG 24 HR TABLET    TK 1 AND 1/2 TS PO D    NITROGLYCERIN (NITROSTAT) 0.4 MG SL TABLET    BLAYNE PRF ANGINA IF EXCEEDS 3 WITHIN 5 MINUTE PERIOD IF NO RELIEF 15 MINUTE APART GO TO THE ER    ONDANSETRON (ZOFRAN) 4 MG TABLET    Take 1 tablet (4 mg total) by mouth every 6 (six) hours as needed for Nausea.    ONETOUCH DELICA LANCETS 30 GAUGE MISC    TEST BID    ONETOUCH VERIO STRP    TEST BID    ONETOUCH VERIO SYSTEM MISC    TEST D    PANTOPRAZOLE (PROTONIX) 40 MG TABLET        POTASSIUM CHLORIDE (MICRO-K) 10 MEQ CPSR    TAKE ONE CAPSULE BY MOUTH DAILY    RANITIDINE (ZANTAC) 300 MG TABLET    TK 1 T PO  D    SIMVASTATIN (ZOCOR) 20 MG TABLET    TK 1 T PO  QPM    TRULICITY 0.75 MG/0.5 ML PNIJ    INJ 1 ML SC WEEKLY   Changed and/or Refilled Medications    Modified Medication Previous Medication    LOSARTAN (COZAAR) 50 MG TABLET losartan (COZAAR) 50 MG tablet       Take 1 tablet (50 mg total) by mouth once daily.    TAKE 1 TABLET BY MOUTH EVERY DAY   Discontinued Medications    LISINOPRIL 10 MG TABLET    Take 10 mg by mouth once daily.           Assessment:       1. Coronary artery disease involving native coronary artery without angina pectoris, unspecified whether native heart    2. Essential  hypertension    3. Acquired hypothyroidism    4. Moderate mixed hyperlipidemia not requiring statin therapy         Plan:  No medication changes have been made.  I suggested that he do a COVID antibody test to confirm if he had the infection.  He is not interested.  He is due to see his endocrinologist who usually does his lipid profile.  Will see him for follow-up in 6 month       Problem List Items Addressed This Visit     None      Visit Diagnoses     Coronary artery disease involving native coronary artery without angina pectoris, unspecified whether native heart    -  Primary    Essential hypertension        Acquired hypothyroidism        Moderate mixed hyperlipidemia not requiring statin therapy               Follow up in about 6 months (around 3/10/2021) for Routine follow up.

## 2021-02-19 ENCOUNTER — TELEPHONE (OUTPATIENT)
Dept: CARDIOLOGY | Facility: CLINIC | Age: 66
End: 2021-02-19

## 2021-02-23 ENCOUNTER — HOSPITAL ENCOUNTER (INPATIENT)
Facility: HOSPITAL | Age: 66
LOS: 8 days | Discharge: HOME HEALTH | DRG: 177 | End: 2021-03-03
Attending: INTERNAL MEDICINE | Admitting: INTERNAL MEDICINE
Payer: MEDICARE

## 2021-02-23 DIAGNOSIS — U07.1 PNEUMONIA DUE TO COVID-19 VIRUS: ICD-10-CM

## 2021-02-23 DIAGNOSIS — J12.82 PNEUMONIA DUE TO COVID-19 VIRUS: ICD-10-CM

## 2021-02-23 DIAGNOSIS — R09.02 HYPOXIA: Primary | ICD-10-CM

## 2021-02-23 PROBLEM — E11.65 DIABETES MELLITUS WITH HYPERGLYCEMIA: Status: ACTIVE | Noted: 2021-02-23

## 2021-02-23 PROBLEM — E87.20 LACTIC ACIDOSIS: Status: ACTIVE | Noted: 2021-02-23

## 2021-02-23 LAB
ALBUMIN SERPL BCP-MCNC: 3.7 G/DL (ref 3.5–5.2)
ALP SERPL-CCNC: 44 U/L (ref 55–135)
ALT SERPL W/O P-5'-P-CCNC: 18 U/L (ref 10–44)
ANION GAP SERPL CALC-SCNC: 13 MMOL/L (ref 8–16)
AST SERPL-CCNC: 31 U/L (ref 10–40)
BACTERIA #/AREA URNS HPF: NEGATIVE /HPF
BASOPHILS # BLD AUTO: 0.02 K/UL (ref 0–0.2)
BASOPHILS NFR BLD: 0.3 % (ref 0–1.9)
BILIRUB SERPL-MCNC: 0.6 MG/DL (ref 0.1–1)
BILIRUB UR QL STRIP: NEGATIVE
BNP SERPL-MCNC: 29 PG/ML (ref 0–99)
BUN SERPL-MCNC: 13 MG/DL (ref 8–23)
CALCIUM SERPL-MCNC: 8.9 MG/DL (ref 8.7–10.5)
CHLORIDE SERPL-SCNC: 96 MMOL/L (ref 95–110)
CK SERPL-CCNC: 107 U/L (ref 20–200)
CLARITY UR: CLEAR
CO2 SERPL-SCNC: 23 MMOL/L (ref 23–29)
COLOR UR: YELLOW
CREAT SERPL-MCNC: 0.9 MG/DL (ref 0.5–1.4)
CRP SERPL-MCNC: 16.24 MG/DL
D DIMER PPP IA.FEU-MCNC: 0.68 UG/ML FEU
DIFFERENTIAL METHOD: ABNORMAL
EOSINOPHIL # BLD AUTO: 0 K/UL (ref 0–0.5)
EOSINOPHIL NFR BLD: 0 % (ref 0–8)
ERYTHROCYTE [DISTWIDTH] IN BLOOD BY AUTOMATED COUNT: 12.4 % (ref 11.5–14.5)
EST. GFR  (AFRICAN AMERICAN): >60 ML/MIN/1.73 M^2
EST. GFR  (NON AFRICAN AMERICAN): >60 ML/MIN/1.73 M^2
FERRITIN SERPL-MCNC: 599 NG/ML (ref 20–300)
GLUCOSE SERPL-MCNC: 185 MG/DL (ref 70–110)
GLUCOSE SERPL-MCNC: 197 MG/DL (ref 70–110)
GLUCOSE UR QL STRIP: ABNORMAL
HCT VFR BLD AUTO: 38.4 % (ref 40–54)
HGB BLD-MCNC: 13 G/DL (ref 14–18)
HGB UR QL STRIP: NEGATIVE
HYALINE CASTS #/AREA URNS LPF: 4 /LPF
IMM GRANULOCYTES # BLD AUTO: 0.02 K/UL (ref 0–0.04)
IMM GRANULOCYTES NFR BLD AUTO: 0.3 % (ref 0–0.5)
KETONES UR QL STRIP: NEGATIVE
LACTATE SERPL-SCNC: 2.4 MMOL/L (ref 0.5–1.9)
LDH SERPL L TO P-CCNC: 263 U/L (ref 110–260)
LEUKOCYTE ESTERASE UR QL STRIP: NEGATIVE
LYMPHOCYTES # BLD AUTO: 0.6 K/UL (ref 1–4.8)
LYMPHOCYTES NFR BLD: 8.9 % (ref 18–48)
MCH RBC QN AUTO: 31.6 PG (ref 27–31)
MCHC RBC AUTO-ENTMCNC: 33.9 G/DL (ref 32–36)
MCV RBC AUTO: 93 FL (ref 82–98)
MICROSCOPIC COMMENT: ABNORMAL
MONOCYTES # BLD AUTO: 0.4 K/UL (ref 0.3–1)
MONOCYTES NFR BLD: 5.5 % (ref 4–15)
NEUTROPHILS # BLD AUTO: 5.5 K/UL (ref 1.8–7.7)
NEUTROPHILS NFR BLD: 85 % (ref 38–73)
NITRITE UR QL STRIP: NEGATIVE
NRBC BLD-RTO: 0 /100 WBC
PH UR STRIP: 6 [PH] (ref 5–8)
PLATELET # BLD AUTO: 184 K/UL (ref 150–350)
PMV BLD AUTO: 10.6 FL (ref 9.2–12.9)
POTASSIUM SERPL-SCNC: 3.9 MMOL/L (ref 3.5–5.1)
PROCALCITONIN SERPL IA-MCNC: 0.13 NG/ML (ref 0–0.5)
PROT SERPL-MCNC: 7.8 G/DL (ref 6–8.4)
PROT UR QL STRIP: ABNORMAL
RBC # BLD AUTO: 4.11 M/UL (ref 4.6–6.2)
RBC #/AREA URNS HPF: 1 /HPF (ref 0–4)
SARS-COV-2 RDRP RESP QL NAA+PROBE: POSITIVE
SODIUM SERPL-SCNC: 132 MMOL/L (ref 136–145)
SP GR UR STRIP: 1.02 (ref 1–1.03)
SQUAMOUS #/AREA URNS HPF: 2 /HPF
TROPONIN I SERPL DL<=0.01 NG/ML-MCNC: <0.03 NG/ML
URN SPEC COLLECT METH UR: ABNORMAL
UROBILINOGEN UR STRIP-ACNC: NEGATIVE EU/DL
WBC # BLD AUTO: 6.52 K/UL (ref 3.9–12.7)
WBC #/AREA URNS HPF: 2 /HPF (ref 0–5)

## 2021-02-23 PROCEDURE — 84145 PROCALCITONIN (PCT): CPT

## 2021-02-23 PROCEDURE — 93010 EKG 12-LEAD: ICD-10-PCS | Mod: ,,, | Performed by: INTERNAL MEDICINE

## 2021-02-23 PROCEDURE — 84484 ASSAY OF TROPONIN QUANT: CPT

## 2021-02-23 PROCEDURE — 25000003 PHARM REV CODE 250: Performed by: NURSE PRACTITIONER

## 2021-02-23 PROCEDURE — 99285 EMERGENCY DEPT VISIT HI MDM: CPT | Mod: 25

## 2021-02-23 PROCEDURE — 12000002 HC ACUTE/MED SURGE SEMI-PRIVATE ROOM

## 2021-02-23 PROCEDURE — 83605 ASSAY OF LACTIC ACID: CPT

## 2021-02-23 PROCEDURE — 96375 TX/PRO/DX INJ NEW DRUG ADDON: CPT

## 2021-02-23 PROCEDURE — 82962 GLUCOSE BLOOD TEST: CPT

## 2021-02-23 PROCEDURE — 63600175 PHARM REV CODE 636 W HCPCS: Performed by: EMERGENCY MEDICINE

## 2021-02-23 PROCEDURE — 93005 ELECTROCARDIOGRAM TRACING: CPT | Performed by: INTERNAL MEDICINE

## 2021-02-23 PROCEDURE — 86140 C-REACTIVE PROTEIN: CPT

## 2021-02-23 PROCEDURE — 36415 COLL VENOUS BLD VENIPUNCTURE: CPT

## 2021-02-23 PROCEDURE — 87040 BLOOD CULTURE FOR BACTERIA: CPT

## 2021-02-23 PROCEDURE — 93010 ELECTROCARDIOGRAM REPORT: CPT | Mod: ,,, | Performed by: INTERNAL MEDICINE

## 2021-02-23 PROCEDURE — 82550 ASSAY OF CK (CPK): CPT

## 2021-02-23 PROCEDURE — 85025 COMPLETE CBC W/AUTO DIFF WBC: CPT

## 2021-02-23 PROCEDURE — 81001 URINALYSIS AUTO W/SCOPE: CPT

## 2021-02-23 PROCEDURE — 63600175 PHARM REV CODE 636 W HCPCS: Performed by: NURSE PRACTITIONER

## 2021-02-23 PROCEDURE — 85379 FIBRIN DEGRADATION QUANT: CPT

## 2021-02-23 PROCEDURE — 80053 COMPREHEN METABOLIC PANEL: CPT

## 2021-02-23 PROCEDURE — 83615 LACTATE (LD) (LDH) ENZYME: CPT

## 2021-02-23 PROCEDURE — 82728 ASSAY OF FERRITIN: CPT

## 2021-02-23 PROCEDURE — 83880 ASSAY OF NATRIURETIC PEPTIDE: CPT

## 2021-02-23 PROCEDURE — 96374 THER/PROPH/DIAG INJ IV PUSH: CPT

## 2021-02-23 PROCEDURE — U0002 COVID-19 LAB TEST NON-CDC: HCPCS

## 2021-02-23 RX ORDER — SODIUM,POTASSIUM PHOSPHATES 280-250MG
2 POWDER IN PACKET (EA) ORAL
Status: DISCONTINUED | OUTPATIENT
Start: 2021-02-23 | End: 2021-03-03 | Stop reason: HOSPADM

## 2021-02-23 RX ORDER — DEXAMETHASONE SODIUM PHOSPHATE 4 MG/ML
6 INJECTION, SOLUTION INTRA-ARTICULAR; INTRALESIONAL; INTRAMUSCULAR; INTRAVENOUS; SOFT TISSUE
Status: COMPLETED | OUTPATIENT
Start: 2021-02-23 | End: 2021-02-23

## 2021-02-23 RX ORDER — SODIUM CHLORIDE 0.9 % (FLUSH) 0.9 %
10 SYRINGE (ML) INJECTION
Status: DISCONTINUED | OUTPATIENT
Start: 2021-02-23 | End: 2021-03-03 | Stop reason: HOSPADM

## 2021-02-23 RX ORDER — LANOLIN ALCOHOL/MO/W.PET/CERES
800 CREAM (GRAM) TOPICAL
Status: DISCONTINUED | OUTPATIENT
Start: 2021-02-23 | End: 2021-03-03 | Stop reason: HOSPADM

## 2021-02-23 RX ORDER — ACETAMINOPHEN 500 MG
1000 TABLET ORAL
Status: DISCONTINUED | OUTPATIENT
Start: 2021-02-23 | End: 2021-03-03 | Stop reason: HOSPADM

## 2021-02-23 RX ORDER — SIMVASTATIN 20 MG/1
20 TABLET, FILM COATED ORAL NIGHTLY
Status: DISCONTINUED | OUTPATIENT
Start: 2021-02-23 | End: 2021-03-03 | Stop reason: HOSPADM

## 2021-02-23 RX ORDER — ACETAMINOPHEN 325 MG/1
650 TABLET ORAL EVERY 8 HOURS PRN
Status: DISCONTINUED | OUTPATIENT
Start: 2021-02-23 | End: 2021-03-03 | Stop reason: HOSPADM

## 2021-02-23 RX ORDER — IBUPROFEN 200 MG
24 TABLET ORAL
Status: DISCONTINUED | OUTPATIENT
Start: 2021-02-23 | End: 2021-03-03 | Stop reason: HOSPADM

## 2021-02-23 RX ORDER — ASPIRIN 325 MG
325 TABLET ORAL DAILY
Status: DISCONTINUED | OUTPATIENT
Start: 2021-02-24 | End: 2021-03-03 | Stop reason: HOSPADM

## 2021-02-23 RX ORDER — NITROGLYCERIN 0.4 MG/1
0.4 TABLET SUBLINGUAL EVERY 5 MIN PRN
Status: DISCONTINUED | OUTPATIENT
Start: 2021-02-23 | End: 2021-03-03 | Stop reason: HOSPADM

## 2021-02-23 RX ORDER — DULAGLUTIDE 1.5 MG/.5ML
1.5 INJECTION, SOLUTION SUBCUTANEOUS WEEKLY
COMMUNITY
End: 2023-11-01

## 2021-02-23 RX ORDER — HYDROCODONE BITARTRATE AND ACETAMINOPHEN 500; 5 MG/1; MG/1
TABLET ORAL
Status: DISCONTINUED | OUTPATIENT
Start: 2021-02-23 | End: 2021-03-03 | Stop reason: HOSPADM

## 2021-02-23 RX ORDER — ALBUTEROL SULFATE 90 UG/1
2 AEROSOL, METERED RESPIRATORY (INHALATION) EVERY 6 HOURS
Status: DISCONTINUED | OUTPATIENT
Start: 2021-02-24 | End: 2021-02-24

## 2021-02-23 RX ORDER — INSULIN ASPART 100 [IU]/ML
1-10 INJECTION, SOLUTION INTRAVENOUS; SUBCUTANEOUS
Status: DISCONTINUED | OUTPATIENT
Start: 2021-02-23 | End: 2021-03-03 | Stop reason: HOSPADM

## 2021-02-23 RX ORDER — GUAIFENESIN/DEXTROMETHORPHAN 100-10MG/5
10 SYRUP ORAL EVERY 4 HOURS PRN
Status: DISCONTINUED | OUTPATIENT
Start: 2021-02-23 | End: 2021-03-03 | Stop reason: HOSPADM

## 2021-02-23 RX ORDER — TALC
6 POWDER (GRAM) TOPICAL NIGHTLY PRN
Status: DISCONTINUED | OUTPATIENT
Start: 2021-02-23 | End: 2021-03-03 | Stop reason: HOSPADM

## 2021-02-23 RX ORDER — LOPERAMIDE HYDROCHLORIDE 2 MG/1
2 CAPSULE ORAL EVERY 6 HOURS PRN
Status: DISCONTINUED | OUTPATIENT
Start: 2021-02-23 | End: 2021-03-03 | Stop reason: HOSPADM

## 2021-02-23 RX ORDER — ONDANSETRON 2 MG/ML
4 INJECTION INTRAMUSCULAR; INTRAVENOUS EVERY 8 HOURS PRN
Status: DISCONTINUED | OUTPATIENT
Start: 2021-02-23 | End: 2021-03-03 | Stop reason: HOSPADM

## 2021-02-23 RX ORDER — ASCORBIC ACID 500 MG
500 TABLET ORAL 2 TIMES DAILY
Status: DISCONTINUED | OUTPATIENT
Start: 2021-02-23 | End: 2021-03-03 | Stop reason: HOSPADM

## 2021-02-23 RX ORDER — GLUCAGON 1 MG
1 KIT INJECTION
Status: DISCONTINUED | OUTPATIENT
Start: 2021-02-23 | End: 2021-03-03 | Stop reason: HOSPADM

## 2021-02-23 RX ORDER — ENOXAPARIN SODIUM 100 MG/ML
40 INJECTION SUBCUTANEOUS EVERY 12 HOURS
Status: DISCONTINUED | OUTPATIENT
Start: 2021-02-23 | End: 2021-03-03 | Stop reason: HOSPADM

## 2021-02-23 RX ORDER — ESCITALOPRAM OXALATE 10 MG/1
10 TABLET ORAL DAILY
Status: DISCONTINUED | OUTPATIENT
Start: 2021-02-24 | End: 2021-03-03 | Stop reason: HOSPADM

## 2021-02-23 RX ORDER — DIPHENHYDRAMINE HYDROCHLORIDE 50 MG/ML
25 INJECTION INTRAMUSCULAR; INTRAVENOUS EVERY 6 HOURS PRN
Status: DISCONTINUED | OUTPATIENT
Start: 2021-02-23 | End: 2021-03-03 | Stop reason: HOSPADM

## 2021-02-23 RX ORDER — IBUPROFEN 200 MG
16 TABLET ORAL
Status: DISCONTINUED | OUTPATIENT
Start: 2021-02-23 | End: 2021-03-03 | Stop reason: HOSPADM

## 2021-02-23 RX ORDER — GLIMEPIRIDE 1 MG/1
2 TABLET ORAL 2 TIMES DAILY
Status: DISCONTINUED | OUTPATIENT
Start: 2021-02-23 | End: 2021-03-03 | Stop reason: HOSPADM

## 2021-02-23 RX ORDER — GLIMEPIRIDE 2 MG/1
2 TABLET ORAL 2 TIMES DAILY
COMMUNITY

## 2021-02-23 RX ORDER — ACETAMINOPHEN 325 MG/1
650 TABLET ORAL EVERY 4 HOURS PRN
Status: DISCONTINUED | OUTPATIENT
Start: 2021-02-23 | End: 2021-03-03 | Stop reason: HOSPADM

## 2021-02-23 RX ORDER — LOSARTAN POTASSIUM 50 MG/1
50 TABLET ORAL DAILY
Status: DISCONTINUED | OUTPATIENT
Start: 2021-02-24 | End: 2021-03-03 | Stop reason: HOSPADM

## 2021-02-23 RX ORDER — CHOLECALCIFEROL (VITAMIN D3) 25 MCG
1000 TABLET ORAL DAILY
Status: DISCONTINUED | OUTPATIENT
Start: 2021-02-24 | End: 2021-03-03 | Stop reason: HOSPADM

## 2021-02-23 RX ORDER — BISACODYL 10 MG
10 SUPPOSITORY, RECTAL RECTAL DAILY PRN
Status: DISCONTINUED | OUTPATIENT
Start: 2021-02-23 | End: 2021-03-03 | Stop reason: HOSPADM

## 2021-02-23 RX ADMIN — SIMVASTATIN 20 MG: 20 TABLET, FILM COATED ORAL at 10:02

## 2021-02-23 RX ADMIN — DEXAMETHASONE SODIUM PHOSPHATE 6 MG: 4 INJECTION, SOLUTION INTRA-ARTICULAR; INTRALESIONAL; INTRAMUSCULAR; INTRAVENOUS; SOFT TISSUE at 09:02

## 2021-02-23 RX ADMIN — ENOXAPARIN SODIUM 40 MG: 40 INJECTION SUBCUTANEOUS at 10:02

## 2021-02-23 RX ADMIN — HUMAN INSULIN 6 UNITS: 100 INJECTION, SOLUTION SUBCUTANEOUS at 09:02

## 2021-02-23 RX ADMIN — REMDESIVIR 200 MG: 100 INJECTION, POWDER, LYOPHILIZED, FOR SOLUTION INTRAVENOUS at 10:02

## 2021-02-23 RX ADMIN — OXYCODONE HYDROCHLORIDE AND ACETAMINOPHEN 500 MG: 500 TABLET ORAL at 10:02

## 2021-02-23 RX ADMIN — GLIMEPIRIDE 2 MG: 1 TABLET ORAL at 10:02

## 2021-02-24 LAB
ABO + RH BLD: NORMAL
ALBUMIN SERPL BCP-MCNC: 3.7 G/DL (ref 3.5–5.2)
ALP SERPL-CCNC: 46 U/L (ref 55–135)
ALT SERPL W/O P-5'-P-CCNC: 20 U/L (ref 10–44)
ANION GAP SERPL CALC-SCNC: 15 MMOL/L (ref 8–16)
AST SERPL-CCNC: 32 U/L (ref 10–40)
BASOPHILS # BLD AUTO: 0 K/UL (ref 0–0.2)
BASOPHILS NFR BLD: 0 % (ref 0–1.9)
BILIRUB SERPL-MCNC: 0.8 MG/DL (ref 0.1–1)
BLD PROD TYP BPU: NORMAL
BLD PROD TYP BPU: NORMAL
BLOOD UNIT EXPIRATION DATE: NORMAL
BLOOD UNIT EXPIRATION DATE: NORMAL
BLOOD UNIT TYPE CODE: 6200
BLOOD UNIT TYPE CODE: 6200
BLOOD UNIT TYPE: NORMAL
BLOOD UNIT TYPE: NORMAL
BUN SERPL-MCNC: 15 MG/DL (ref 8–23)
CALCIUM SERPL-MCNC: 9.3 MG/DL (ref 8.7–10.5)
CHLORIDE SERPL-SCNC: 96 MMOL/L (ref 95–110)
CO2 SERPL-SCNC: 23 MMOL/L (ref 23–29)
CODING SYSTEM: NORMAL
CODING SYSTEM: NORMAL
CREAT SERPL-MCNC: 0.7 MG/DL (ref 0.5–1.4)
DIFFERENTIAL METHOD: ABNORMAL
DISPENSE STATUS: NORMAL
DISPENSE STATUS: NORMAL
EOSINOPHIL # BLD AUTO: 0 K/UL (ref 0–0.5)
EOSINOPHIL NFR BLD: 0 % (ref 0–8)
ERYTHROCYTE [DISTWIDTH] IN BLOOD BY AUTOMATED COUNT: 12.4 % (ref 11.5–14.5)
EST. GFR  (AFRICAN AMERICAN): >60 ML/MIN/1.73 M^2
EST. GFR  (NON AFRICAN AMERICAN): >60 ML/MIN/1.73 M^2
GLUCOSE SERPL-MCNC: 229 MG/DL (ref 70–110)
GLUCOSE SERPL-MCNC: 255 MG/DL (ref 70–110)
GLUCOSE SERPL-MCNC: 270 MG/DL (ref 70–110)
GLUCOSE SERPL-MCNC: 295 MG/DL (ref 70–110)
GLUCOSE SERPL-MCNC: 309 MG/DL (ref 70–110)
HCT VFR BLD AUTO: 38.9 % (ref 40–54)
HGB BLD-MCNC: 13.2 G/DL (ref 14–18)
IMM GRANULOCYTES # BLD AUTO: 0.02 K/UL (ref 0–0.04)
IMM GRANULOCYTES NFR BLD AUTO: 0.3 % (ref 0–0.5)
LACTATE SERPL-SCNC: 2 MMOL/L (ref 0.5–1.9)
LYMPHOCYTES # BLD AUTO: 0.8 K/UL (ref 1–4.8)
LYMPHOCYTES NFR BLD: 12.7 % (ref 18–48)
MAGNESIUM SERPL-MCNC: 1.6 MG/DL (ref 1.6–2.6)
MCH RBC QN AUTO: 31.7 PG (ref 27–31)
MCHC RBC AUTO-ENTMCNC: 33.9 G/DL (ref 32–36)
MCV RBC AUTO: 94 FL (ref 82–98)
MONOCYTES # BLD AUTO: 0.3 K/UL (ref 0.3–1)
MONOCYTES NFR BLD: 4.2 % (ref 4–15)
NEUTROPHILS # BLD AUTO: 4.9 K/UL (ref 1.8–7.7)
NEUTROPHILS NFR BLD: 82.8 % (ref 38–73)
NRBC BLD-RTO: 0 /100 WBC
PHOSPHATE SERPL-MCNC: 3.1 MG/DL (ref 2.7–4.5)
PLATELET # BLD AUTO: 200 K/UL (ref 150–350)
PMV BLD AUTO: 10.5 FL (ref 9.2–12.9)
POTASSIUM SERPL-SCNC: 4.3 MMOL/L (ref 3.5–5.1)
PROT SERPL-MCNC: 8 G/DL (ref 6–8.4)
RBC # BLD AUTO: 4.16 M/UL (ref 4.6–6.2)
SODIUM SERPL-SCNC: 134 MMOL/L (ref 136–145)
UNIT NUMBER: NORMAL
UNIT NUMBER: NORMAL
WBC # BLD AUTO: 5.91 K/UL (ref 3.9–12.7)

## 2021-02-24 PROCEDURE — 36415 COLL VENOUS BLD VENIPUNCTURE: CPT

## 2021-02-24 PROCEDURE — 80053 COMPREHEN METABOLIC PANEL: CPT

## 2021-02-24 PROCEDURE — 94799 UNLISTED PULMONARY SVC/PX: CPT

## 2021-02-24 PROCEDURE — 85025 COMPLETE CBC W/AUTO DIFF WBC: CPT

## 2021-02-24 PROCEDURE — 83605 ASSAY OF LACTIC ACID: CPT

## 2021-02-24 PROCEDURE — 99900031 HC PATIENT EDUCATION (STAT)

## 2021-02-24 PROCEDURE — 25000242 PHARM REV CODE 250 ALT 637 W/ HCPCS: Performed by: NURSE PRACTITIONER

## 2021-02-24 PROCEDURE — 84100 ASSAY OF PHOSPHORUS: CPT

## 2021-02-24 PROCEDURE — 99900035 HC TECH TIME PER 15 MIN (STAT)

## 2021-02-24 PROCEDURE — 27000221 HC OXYGEN, UP TO 24 HOURS

## 2021-02-24 PROCEDURE — 94660 CPAP INITIATION&MGMT: CPT

## 2021-02-24 PROCEDURE — 63600175 PHARM REV CODE 636 W HCPCS: Performed by: NURSE PRACTITIONER

## 2021-02-24 PROCEDURE — 86900 BLOOD TYPING SEROLOGIC ABO: CPT

## 2021-02-24 PROCEDURE — 25000003 PHARM REV CODE 250: Performed by: NURSE PRACTITIONER

## 2021-02-24 PROCEDURE — 94761 N-INVAS EAR/PLS OXIMETRY MLT: CPT

## 2021-02-24 PROCEDURE — 12000002 HC ACUTE/MED SURGE SEMI-PRIVATE ROOM

## 2021-02-24 PROCEDURE — 83735 ASSAY OF MAGNESIUM: CPT

## 2021-02-24 RX ADMIN — REMDESIVIR 100 MG: 100 INJECTION, POWDER, LYOPHILIZED, FOR SOLUTION INTRAVENOUS at 11:02

## 2021-02-24 RX ADMIN — ASPIRIN 325 MG ORAL TABLET 325 MG: 325 PILL ORAL at 09:02

## 2021-02-24 RX ADMIN — INSULIN ASPART 6 UNITS: 100 INJECTION, SOLUTION INTRAVENOUS; SUBCUTANEOUS at 12:02

## 2021-02-24 RX ADMIN — GLIMEPIRIDE 2 MG: 1 TABLET ORAL at 09:02

## 2021-02-24 RX ADMIN — INSULIN ASPART 8 UNITS: 100 INJECTION, SOLUTION INTRAVENOUS; SUBCUTANEOUS at 05:02

## 2021-02-24 RX ADMIN — METOPROLOL SUCCINATE 75 MG: 50 TABLET, FILM COATED, EXTENDED RELEASE ORAL at 09:02

## 2021-02-24 RX ADMIN — ENOXAPARIN SODIUM 40 MG: 40 INJECTION SUBCUTANEOUS at 09:02

## 2021-02-24 RX ADMIN — DEXAMETHASONE 6 MG: 2 TABLET ORAL at 09:02

## 2021-02-24 RX ADMIN — OXYCODONE HYDROCHLORIDE AND ACETAMINOPHEN 500 MG: 500 TABLET ORAL at 09:02

## 2021-02-24 RX ADMIN — INSULIN ASPART 3 UNITS: 100 INJECTION, SOLUTION INTRAVENOUS; SUBCUTANEOUS at 09:02

## 2021-02-24 RX ADMIN — SIMVASTATIN 20 MG: 20 TABLET, FILM COATED ORAL at 09:02

## 2021-02-24 RX ADMIN — LOSARTAN POTASSIUM 50 MG: 50 TABLET, FILM COATED ORAL at 09:02

## 2021-02-24 RX ADMIN — Medication 1000 UNITS: at 09:02

## 2021-02-24 RX ADMIN — LEVOTHYROXINE SODIUM 150 MCG: 25 TABLET ORAL at 05:02

## 2021-02-24 RX ADMIN — ESCITALOPRAM OXALATE 10 MG: 10 TABLET ORAL at 09:02

## 2021-02-24 RX ADMIN — THERA TABS 1 TABLET: TAB at 09:02

## 2021-02-25 LAB
ALBUMIN SERPL BCP-MCNC: 3.4 G/DL (ref 3.5–5.2)
ALP SERPL-CCNC: 47 U/L (ref 55–135)
ALT SERPL W/O P-5'-P-CCNC: 16 U/L (ref 10–44)
ANION GAP SERPL CALC-SCNC: 13 MMOL/L (ref 8–16)
AST SERPL-CCNC: 25 U/L (ref 10–40)
BASOPHILS # BLD AUTO: 0.01 K/UL (ref 0–0.2)
BASOPHILS NFR BLD: 0.1 % (ref 0–1.9)
BILIRUB SERPL-MCNC: 0.6 MG/DL (ref 0.1–1)
BUN SERPL-MCNC: 24 MG/DL (ref 8–23)
CALCIUM SERPL-MCNC: 9.6 MG/DL (ref 8.7–10.5)
CHLORIDE SERPL-SCNC: 100 MMOL/L (ref 95–110)
CO2 SERPL-SCNC: 25 MMOL/L (ref 23–29)
CREAT SERPL-MCNC: 0.8 MG/DL (ref 0.5–1.4)
CRP SERPL-MCNC: 11.63 MG/DL
D DIMER PPP IA.FEU-MCNC: 0.55 UG/ML FEU
DIFFERENTIAL METHOD: ABNORMAL
EOSINOPHIL # BLD AUTO: 0 K/UL (ref 0–0.5)
EOSINOPHIL NFR BLD: 0 % (ref 0–8)
ERYTHROCYTE [DISTWIDTH] IN BLOOD BY AUTOMATED COUNT: 12.6 % (ref 11.5–14.5)
EST. GFR  (AFRICAN AMERICAN): >60 ML/MIN/1.73 M^2
EST. GFR  (NON AFRICAN AMERICAN): >60 ML/MIN/1.73 M^2
FERRITIN SERPL-MCNC: 616 NG/ML (ref 20–300)
GLUCOSE SERPL-MCNC: 251 MG/DL (ref 70–110)
GLUCOSE SERPL-MCNC: 266 MG/DL (ref 70–110)
GLUCOSE SERPL-MCNC: 292 MG/DL (ref 70–110)
GLUCOSE SERPL-MCNC: 296 MG/DL (ref 70–110)
GLUCOSE SERPL-MCNC: 304 MG/DL (ref 70–110)
HCT VFR BLD AUTO: 36.6 % (ref 40–54)
HGB BLD-MCNC: 12.4 G/DL (ref 14–18)
IMM GRANULOCYTES # BLD AUTO: 0.06 K/UL (ref 0–0.04)
IMM GRANULOCYTES NFR BLD AUTO: 0.6 % (ref 0–0.5)
LDH SERPL L TO P-CCNC: 276 U/L (ref 110–260)
LYMPHOCYTES # BLD AUTO: 0.8 K/UL (ref 1–4.8)
LYMPHOCYTES NFR BLD: 8.1 % (ref 18–48)
MAGNESIUM SERPL-MCNC: 2 MG/DL (ref 1.6–2.6)
MCH RBC QN AUTO: 32.2 PG (ref 27–31)
MCHC RBC AUTO-ENTMCNC: 33.9 G/DL (ref 32–36)
MCV RBC AUTO: 95 FL (ref 82–98)
MONOCYTES # BLD AUTO: 0.5 K/UL (ref 0.3–1)
MONOCYTES NFR BLD: 4.4 % (ref 4–15)
NEUTROPHILS # BLD AUTO: 8.9 K/UL (ref 1.8–7.7)
NEUTROPHILS NFR BLD: 86.8 % (ref 38–73)
NRBC BLD-RTO: 0 /100 WBC
PHOSPHATE SERPL-MCNC: 2.8 MG/DL (ref 2.7–4.5)
PLATELET # BLD AUTO: 259 K/UL (ref 150–350)
PMV BLD AUTO: 10.7 FL (ref 9.2–12.9)
POTASSIUM SERPL-SCNC: 4.3 MMOL/L (ref 3.5–5.1)
PROT SERPL-MCNC: 7.7 G/DL (ref 6–8.4)
RBC # BLD AUTO: 3.85 M/UL (ref 4.6–6.2)
SODIUM SERPL-SCNC: 138 MMOL/L (ref 136–145)
WBC # BLD AUTO: 10.23 K/UL (ref 3.9–12.7)

## 2021-02-25 PROCEDURE — 27000221 HC OXYGEN, UP TO 24 HOURS

## 2021-02-25 PROCEDURE — C9399 UNCLASSIFIED DRUGS OR BIOLOG: HCPCS | Performed by: INTERNAL MEDICINE

## 2021-02-25 PROCEDURE — 63600175 PHARM REV CODE 636 W HCPCS: Performed by: NURSE PRACTITIONER

## 2021-02-25 PROCEDURE — 84100 ASSAY OF PHOSPHORUS: CPT

## 2021-02-25 PROCEDURE — 12000002 HC ACUTE/MED SURGE SEMI-PRIVATE ROOM

## 2021-02-25 PROCEDURE — 99900031 HC PATIENT EDUCATION (STAT)

## 2021-02-25 PROCEDURE — 85025 COMPLETE CBC W/AUTO DIFF WBC: CPT

## 2021-02-25 PROCEDURE — 25000003 PHARM REV CODE 250: Performed by: NURSE PRACTITIONER

## 2021-02-25 PROCEDURE — 21400001 HC TELEMETRY ROOM

## 2021-02-25 PROCEDURE — 80053 COMPREHEN METABOLIC PANEL: CPT

## 2021-02-25 PROCEDURE — 85379 FIBRIN DEGRADATION QUANT: CPT

## 2021-02-25 PROCEDURE — 36415 COLL VENOUS BLD VENIPUNCTURE: CPT

## 2021-02-25 PROCEDURE — 94761 N-INVAS EAR/PLS OXIMETRY MLT: CPT

## 2021-02-25 PROCEDURE — 25000003 PHARM REV CODE 250: Performed by: INTERNAL MEDICINE

## 2021-02-25 PROCEDURE — 99900035 HC TECH TIME PER 15 MIN (STAT)

## 2021-02-25 PROCEDURE — 25000242 PHARM REV CODE 250 ALT 637 W/ HCPCS: Performed by: NURSE PRACTITIONER

## 2021-02-25 PROCEDURE — 83735 ASSAY OF MAGNESIUM: CPT

## 2021-02-25 PROCEDURE — 86140 C-REACTIVE PROTEIN: CPT

## 2021-02-25 PROCEDURE — 83615 LACTATE (LD) (LDH) ENZYME: CPT

## 2021-02-25 PROCEDURE — 82728 ASSAY OF FERRITIN: CPT

## 2021-02-25 RX ADMIN — METOPROLOL SUCCINATE 75 MG: 50 TABLET, FILM COATED, EXTENDED RELEASE ORAL at 09:02

## 2021-02-25 RX ADMIN — INSULIN ASPART 8 UNITS: 100 INJECTION, SOLUTION INTRAVENOUS; SUBCUTANEOUS at 05:02

## 2021-02-25 RX ADMIN — SIMVASTATIN 20 MG: 20 TABLET, FILM COATED ORAL at 09:02

## 2021-02-25 RX ADMIN — OXYCODONE HYDROCHLORIDE AND ACETAMINOPHEN 500 MG: 500 TABLET ORAL at 09:02

## 2021-02-25 RX ADMIN — INSULIN DETEMIR 10 UNITS: 100 INJECTION, SOLUTION SUBCUTANEOUS at 09:02

## 2021-02-25 RX ADMIN — REMDESIVIR 100 MG: 100 INJECTION, POWDER, LYOPHILIZED, FOR SOLUTION INTRAVENOUS at 09:02

## 2021-02-25 RX ADMIN — THERA TABS 1 TABLET: TAB at 09:02

## 2021-02-25 RX ADMIN — DEXAMETHASONE 6 MG: 2 TABLET ORAL at 09:02

## 2021-02-25 RX ADMIN — INSULIN ASPART 3 UNITS: 100 INJECTION, SOLUTION INTRAVENOUS; SUBCUTANEOUS at 10:02

## 2021-02-25 RX ADMIN — INSULIN ASPART 3 UNITS: 100 INJECTION, SOLUTION INTRAVENOUS; SUBCUTANEOUS at 09:02

## 2021-02-25 RX ADMIN — ESCITALOPRAM OXALATE 10 MG: 10 TABLET ORAL at 09:02

## 2021-02-25 RX ADMIN — LOSARTAN POTASSIUM 50 MG: 50 TABLET, FILM COATED ORAL at 09:02

## 2021-02-25 RX ADMIN — LEVOTHYROXINE SODIUM 150 MCG: 25 TABLET ORAL at 05:02

## 2021-02-25 RX ADMIN — GLIMEPIRIDE 2 MG: 1 TABLET ORAL at 09:02

## 2021-02-25 RX ADMIN — ASPIRIN 325 MG ORAL TABLET 325 MG: 325 PILL ORAL at 09:02

## 2021-02-25 RX ADMIN — ENOXAPARIN SODIUM 40 MG: 40 INJECTION SUBCUTANEOUS at 09:02

## 2021-02-25 RX ADMIN — Medication 1000 UNITS: at 09:02

## 2021-02-26 LAB
ALBUMIN SERPL BCP-MCNC: 3.3 G/DL (ref 3.5–5.2)
ALP SERPL-CCNC: 46 U/L (ref 55–135)
ALT SERPL W/O P-5'-P-CCNC: 17 U/L (ref 10–44)
ANION GAP SERPL CALC-SCNC: 11 MMOL/L (ref 8–16)
AST SERPL-CCNC: 26 U/L (ref 10–40)
BASOPHILS # BLD AUTO: 0.01 K/UL (ref 0–0.2)
BASOPHILS NFR BLD: 0.1 % (ref 0–1.9)
BILIRUB SERPL-MCNC: 0.5 MG/DL (ref 0.1–1)
BUN SERPL-MCNC: 22 MG/DL (ref 8–23)
CALCIUM SERPL-MCNC: 8.9 MG/DL (ref 8.7–10.5)
CHLORIDE SERPL-SCNC: 103 MMOL/L (ref 95–110)
CO2 SERPL-SCNC: 22 MMOL/L (ref 23–29)
CREAT SERPL-MCNC: 0.6 MG/DL (ref 0.5–1.4)
CRP SERPL-MCNC: 6.32 MG/DL
D DIMER PPP IA.FEU-MCNC: 0.6 UG/ML FEU
DIFFERENTIAL METHOD: ABNORMAL
EOSINOPHIL # BLD AUTO: 0 K/UL (ref 0–0.5)
EOSINOPHIL NFR BLD: 0 % (ref 0–8)
ERYTHROCYTE [DISTWIDTH] IN BLOOD BY AUTOMATED COUNT: 12.4 % (ref 11.5–14.5)
EST. GFR  (AFRICAN AMERICAN): >60 ML/MIN/1.73 M^2
EST. GFR  (NON AFRICAN AMERICAN): >60 ML/MIN/1.73 M^2
FERRITIN SERPL-MCNC: 561 NG/ML (ref 20–300)
GLUCOSE SERPL-MCNC: 231 MG/DL (ref 70–110)
GLUCOSE SERPL-MCNC: 268 MG/DL (ref 70–110)
GLUCOSE SERPL-MCNC: 301 MG/DL (ref 70–110)
GLUCOSE SERPL-MCNC: 332 MG/DL (ref 70–110)
HCT VFR BLD AUTO: 37.2 % (ref 40–54)
HGB BLD-MCNC: 12.5 G/DL (ref 14–18)
IMM GRANULOCYTES # BLD AUTO: 0.04 K/UL (ref 0–0.04)
IMM GRANULOCYTES NFR BLD AUTO: 0.4 % (ref 0–0.5)
LDH SERPL L TO P-CCNC: 281 U/L (ref 110–260)
LYMPHOCYTES # BLD AUTO: 0.8 K/UL (ref 1–4.8)
LYMPHOCYTES NFR BLD: 7.7 % (ref 18–48)
MAGNESIUM SERPL-MCNC: 2.1 MG/DL (ref 1.6–2.6)
MCH RBC QN AUTO: 31.4 PG (ref 27–31)
MCHC RBC AUTO-ENTMCNC: 33.6 G/DL (ref 32–36)
MCV RBC AUTO: 94 FL (ref 82–98)
MONOCYTES # BLD AUTO: 0.4 K/UL (ref 0.3–1)
MONOCYTES NFR BLD: 4 % (ref 4–15)
NEUTROPHILS # BLD AUTO: 9.2 K/UL (ref 1.8–7.7)
NEUTROPHILS NFR BLD: 87.8 % (ref 38–73)
NRBC BLD-RTO: 0 /100 WBC
PHOSPHATE SERPL-MCNC: 3.4 MG/DL (ref 2.7–4.5)
PLATELET # BLD AUTO: 314 K/UL (ref 150–350)
PMV BLD AUTO: 10.7 FL (ref 9.2–12.9)
POTASSIUM SERPL-SCNC: 3.8 MMOL/L (ref 3.5–5.1)
PROT SERPL-MCNC: 7.3 G/DL (ref 6–8.4)
RBC # BLD AUTO: 3.98 M/UL (ref 4.6–6.2)
SODIUM SERPL-SCNC: 136 MMOL/L (ref 136–145)
WBC # BLD AUTO: 10.42 K/UL (ref 3.9–12.7)

## 2021-02-26 PROCEDURE — 25000242 PHARM REV CODE 250 ALT 637 W/ HCPCS: Performed by: NURSE PRACTITIONER

## 2021-02-26 PROCEDURE — C9399 UNCLASSIFIED DRUGS OR BIOLOG: HCPCS | Performed by: INTERNAL MEDICINE

## 2021-02-26 PROCEDURE — 84100 ASSAY OF PHOSPHORUS: CPT

## 2021-02-26 PROCEDURE — 27000221 HC OXYGEN, UP TO 24 HOURS

## 2021-02-26 PROCEDURE — 86140 C-REACTIVE PROTEIN: CPT

## 2021-02-26 PROCEDURE — 94761 N-INVAS EAR/PLS OXIMETRY MLT: CPT

## 2021-02-26 PROCEDURE — 83735 ASSAY OF MAGNESIUM: CPT

## 2021-02-26 PROCEDURE — 99900035 HC TECH TIME PER 15 MIN (STAT)

## 2021-02-26 PROCEDURE — 83615 LACTATE (LD) (LDH) ENZYME: CPT

## 2021-02-26 PROCEDURE — 85025 COMPLETE CBC W/AUTO DIFF WBC: CPT

## 2021-02-26 PROCEDURE — 25000003 PHARM REV CODE 250: Performed by: INTERNAL MEDICINE

## 2021-02-26 PROCEDURE — 12000002 HC ACUTE/MED SURGE SEMI-PRIVATE ROOM

## 2021-02-26 PROCEDURE — 25000003 PHARM REV CODE 250: Performed by: NURSE PRACTITIONER

## 2021-02-26 PROCEDURE — 85379 FIBRIN DEGRADATION QUANT: CPT

## 2021-02-26 PROCEDURE — 21400001 HC TELEMETRY ROOM

## 2021-02-26 PROCEDURE — 63600175 PHARM REV CODE 636 W HCPCS: Performed by: NURSE PRACTITIONER

## 2021-02-26 PROCEDURE — 82728 ASSAY OF FERRITIN: CPT

## 2021-02-26 PROCEDURE — 36415 COLL VENOUS BLD VENIPUNCTURE: CPT

## 2021-02-26 PROCEDURE — 80053 COMPREHEN METABOLIC PANEL: CPT

## 2021-02-26 PROCEDURE — 94799 UNLISTED PULMONARY SVC/PX: CPT

## 2021-02-26 RX ADMIN — ESCITALOPRAM OXALATE 10 MG: 10 TABLET ORAL at 09:02

## 2021-02-26 RX ADMIN — ASPIRIN 325 MG ORAL TABLET 325 MG: 325 PILL ORAL at 09:02

## 2021-02-26 RX ADMIN — LOSARTAN POTASSIUM 50 MG: 50 TABLET, FILM COATED ORAL at 09:02

## 2021-02-26 RX ADMIN — Medication 1000 UNITS: at 09:02

## 2021-02-26 RX ADMIN — GLIMEPIRIDE 2 MG: 1 TABLET ORAL at 09:02

## 2021-02-26 RX ADMIN — OXYCODONE HYDROCHLORIDE AND ACETAMINOPHEN 500 MG: 500 TABLET ORAL at 09:02

## 2021-02-26 RX ADMIN — METOPROLOL SUCCINATE 75 MG: 50 TABLET, FILM COATED, EXTENDED RELEASE ORAL at 09:02

## 2021-02-26 RX ADMIN — REMDESIVIR 100 MG: 100 INJECTION, POWDER, LYOPHILIZED, FOR SOLUTION INTRAVENOUS at 11:02

## 2021-02-26 RX ADMIN — DEXAMETHASONE 6 MG: 2 TABLET ORAL at 09:02

## 2021-02-26 RX ADMIN — SIMVASTATIN 20 MG: 20 TABLET, FILM COATED ORAL at 09:02

## 2021-02-26 RX ADMIN — INSULIN ASPART 8 UNITS: 100 INJECTION, SOLUTION INTRAVENOUS; SUBCUTANEOUS at 04:02

## 2021-02-26 RX ADMIN — INSULIN ASPART 4 UNITS: 100 INJECTION, SOLUTION INTRAVENOUS; SUBCUTANEOUS at 09:02

## 2021-02-26 RX ADMIN — INSULIN DETEMIR 10 UNITS: 100 INJECTION, SOLUTION SUBCUTANEOUS at 09:02

## 2021-02-26 RX ADMIN — THERA TABS 1 TABLET: TAB at 09:02

## 2021-02-26 RX ADMIN — LEVOTHYROXINE SODIUM 150 MCG: 25 TABLET ORAL at 06:02

## 2021-02-26 RX ADMIN — ENOXAPARIN SODIUM 40 MG: 40 INJECTION SUBCUTANEOUS at 09:02

## 2021-02-26 RX ADMIN — INSULIN ASPART 6 UNITS: 100 INJECTION, SOLUTION INTRAVENOUS; SUBCUTANEOUS at 09:02

## 2021-02-27 LAB
ALBUMIN SERPL BCP-MCNC: 3.2 G/DL (ref 3.5–5.2)
ALP SERPL-CCNC: 51 U/L (ref 55–135)
ALT SERPL W/O P-5'-P-CCNC: 20 U/L (ref 10–44)
ANION GAP SERPL CALC-SCNC: 12 MMOL/L (ref 8–16)
AST SERPL-CCNC: 33 U/L (ref 10–40)
BASOPHILS # BLD AUTO: 0.01 K/UL (ref 0–0.2)
BASOPHILS NFR BLD: 0.1 % (ref 0–1.9)
BILIRUB SERPL-MCNC: 0.8 MG/DL (ref 0.1–1)
BUN SERPL-MCNC: 22 MG/DL (ref 8–23)
CALCIUM SERPL-MCNC: 9.2 MG/DL (ref 8.7–10.5)
CHLORIDE SERPL-SCNC: 106 MMOL/L (ref 95–110)
CO2 SERPL-SCNC: 23 MMOL/L (ref 23–29)
CREAT SERPL-MCNC: 0.8 MG/DL (ref 0.5–1.4)
CRP SERPL-MCNC: 3.12 MG/DL
D DIMER PPP IA.FEU-MCNC: 1.02 UG/ML FEU
DIFFERENTIAL METHOD: ABNORMAL
EOSINOPHIL # BLD AUTO: 0 K/UL (ref 0–0.5)
EOSINOPHIL NFR BLD: 0 % (ref 0–8)
ERYTHROCYTE [DISTWIDTH] IN BLOOD BY AUTOMATED COUNT: 12.4 % (ref 11.5–14.5)
EST. GFR  (AFRICAN AMERICAN): >60 ML/MIN/1.73 M^2
EST. GFR  (NON AFRICAN AMERICAN): >60 ML/MIN/1.73 M^2
FERRITIN SERPL-MCNC: 528 NG/ML (ref 20–300)
GLUCOSE SERPL-MCNC: 236 MG/DL (ref 70–110)
GLUCOSE SERPL-MCNC: 253 MG/DL (ref 70–110)
GLUCOSE SERPL-MCNC: 290 MG/DL (ref 70–110)
GLUCOSE SERPL-MCNC: 321 MG/DL (ref 70–110)
GLUCOSE SERPL-MCNC: 393 MG/DL (ref 70–110)
HCT VFR BLD AUTO: 38.4 % (ref 40–54)
HGB BLD-MCNC: 12.8 G/DL (ref 14–18)
IMM GRANULOCYTES # BLD AUTO: 0.09 K/UL (ref 0–0.04)
IMM GRANULOCYTES NFR BLD AUTO: 0.9 % (ref 0–0.5)
LDH SERPL L TO P-CCNC: 293 U/L (ref 110–260)
LYMPHOCYTES # BLD AUTO: 1.2 K/UL (ref 1–4.8)
LYMPHOCYTES NFR BLD: 11.5 % (ref 18–48)
MAGNESIUM SERPL-MCNC: 2 MG/DL (ref 1.6–2.6)
MCH RBC QN AUTO: 31.6 PG (ref 27–31)
MCHC RBC AUTO-ENTMCNC: 33.3 G/DL (ref 32–36)
MCV RBC AUTO: 95 FL (ref 82–98)
MONOCYTES # BLD AUTO: 0.4 K/UL (ref 0.3–1)
MONOCYTES NFR BLD: 4.3 % (ref 4–15)
NEUTROPHILS # BLD AUTO: 8.4 K/UL (ref 1.8–7.7)
NEUTROPHILS NFR BLD: 83.2 % (ref 38–73)
NRBC BLD-RTO: 0 /100 WBC
PHOSPHATE SERPL-MCNC: 3.2 MG/DL (ref 2.7–4.5)
PLATELET # BLD AUTO: 375 K/UL (ref 150–350)
PMV BLD AUTO: 10.4 FL (ref 9.2–12.9)
POTASSIUM SERPL-SCNC: 4.1 MMOL/L (ref 3.5–5.1)
PROT SERPL-MCNC: 7.1 G/DL (ref 6–8.4)
RBC # BLD AUTO: 4.05 M/UL (ref 4.6–6.2)
SODIUM SERPL-SCNC: 141 MMOL/L (ref 136–145)
WBC # BLD AUTO: 10.14 K/UL (ref 3.9–12.7)

## 2021-02-27 PROCEDURE — 94799 UNLISTED PULMONARY SVC/PX: CPT

## 2021-02-27 PROCEDURE — 85379 FIBRIN DEGRADATION QUANT: CPT

## 2021-02-27 PROCEDURE — 94761 N-INVAS EAR/PLS OXIMETRY MLT: CPT

## 2021-02-27 PROCEDURE — 84100 ASSAY OF PHOSPHORUS: CPT

## 2021-02-27 PROCEDURE — 83615 LACTATE (LD) (LDH) ENZYME: CPT

## 2021-02-27 PROCEDURE — 27000221 HC OXYGEN, UP TO 24 HOURS

## 2021-02-27 PROCEDURE — 80053 COMPREHEN METABOLIC PANEL: CPT

## 2021-02-27 PROCEDURE — 82962 GLUCOSE BLOOD TEST: CPT

## 2021-02-27 PROCEDURE — 86140 C-REACTIVE PROTEIN: CPT

## 2021-02-27 PROCEDURE — 85025 COMPLETE CBC W/AUTO DIFF WBC: CPT

## 2021-02-27 PROCEDURE — 12000002 HC ACUTE/MED SURGE SEMI-PRIVATE ROOM

## 2021-02-27 PROCEDURE — 25000003 PHARM REV CODE 250: Performed by: NURSE PRACTITIONER

## 2021-02-27 PROCEDURE — 63600175 PHARM REV CODE 636 W HCPCS: Performed by: NURSE PRACTITIONER

## 2021-02-27 PROCEDURE — 83735 ASSAY OF MAGNESIUM: CPT

## 2021-02-27 PROCEDURE — 82728 ASSAY OF FERRITIN: CPT

## 2021-02-27 PROCEDURE — 99900035 HC TECH TIME PER 15 MIN (STAT)

## 2021-02-27 PROCEDURE — 36415 COLL VENOUS BLD VENIPUNCTURE: CPT

## 2021-02-27 PROCEDURE — 25000242 PHARM REV CODE 250 ALT 637 W/ HCPCS: Performed by: NURSE PRACTITIONER

## 2021-02-27 PROCEDURE — 21400001 HC TELEMETRY ROOM

## 2021-02-27 RX ADMIN — OXYCODONE HYDROCHLORIDE AND ACETAMINOPHEN 500 MG: 500 TABLET ORAL at 09:02

## 2021-02-27 RX ADMIN — Medication 1000 UNITS: at 09:02

## 2021-02-27 RX ADMIN — LOSARTAN POTASSIUM 50 MG: 50 TABLET, FILM COATED ORAL at 09:02

## 2021-02-27 RX ADMIN — ASPIRIN 325 MG ORAL TABLET 325 MG: 325 PILL ORAL at 09:02

## 2021-02-27 RX ADMIN — GLIMEPIRIDE 2 MG: 1 TABLET ORAL at 09:02

## 2021-02-27 RX ADMIN — ENOXAPARIN SODIUM 40 MG: 40 INJECTION SUBCUTANEOUS at 09:02

## 2021-02-27 RX ADMIN — INSULIN DETEMIR 10 UNITS: 100 INJECTION, SOLUTION SUBCUTANEOUS at 09:02

## 2021-02-27 RX ADMIN — INSULIN ASPART 5 UNITS: 100 INJECTION, SOLUTION INTRAVENOUS; SUBCUTANEOUS at 09:02

## 2021-02-27 RX ADMIN — REMDESIVIR 100 MG: 100 INJECTION, POWDER, LYOPHILIZED, FOR SOLUTION INTRAVENOUS at 09:02

## 2021-02-27 RX ADMIN — LEVOTHYROXINE SODIUM 150 MCG: 25 TABLET ORAL at 06:02

## 2021-02-27 RX ADMIN — ESCITALOPRAM OXALATE 10 MG: 10 TABLET ORAL at 09:02

## 2021-02-27 RX ADMIN — DEXAMETHASONE 6 MG: 2 TABLET ORAL at 09:02

## 2021-02-27 RX ADMIN — SIMVASTATIN 20 MG: 20 TABLET, FILM COATED ORAL at 09:02

## 2021-02-27 RX ADMIN — THERA TABS 1 TABLET: TAB at 09:02

## 2021-02-27 RX ADMIN — METOPROLOL SUCCINATE 75 MG: 50 TABLET, FILM COATED, EXTENDED RELEASE ORAL at 09:02

## 2021-02-28 LAB
ALBUMIN SERPL BCP-MCNC: 3.1 G/DL (ref 3.5–5.2)
ALP SERPL-CCNC: 52 U/L (ref 55–135)
ALT SERPL W/O P-5'-P-CCNC: 22 U/L (ref 10–44)
ANION GAP SERPL CALC-SCNC: 10 MMOL/L (ref 8–16)
AST SERPL-CCNC: 29 U/L (ref 10–40)
BACTERIA BLD CULT: NORMAL
BASOPHILS # BLD AUTO: 0.01 K/UL (ref 0–0.2)
BASOPHILS NFR BLD: 0.1 % (ref 0–1.9)
BILIRUB SERPL-MCNC: 0.9 MG/DL (ref 0.1–1)
BUN SERPL-MCNC: 19 MG/DL (ref 8–23)
CALCIUM SERPL-MCNC: 8.5 MG/DL (ref 8.7–10.5)
CHLORIDE SERPL-SCNC: 105 MMOL/L (ref 95–110)
CO2 SERPL-SCNC: 23 MMOL/L (ref 23–29)
CREAT SERPL-MCNC: 0.6 MG/DL (ref 0.5–1.4)
CRP SERPL-MCNC: 6.34 MG/DL
D DIMER PPP IA.FEU-MCNC: 1.02 UG/ML FEU
DIFFERENTIAL METHOD: ABNORMAL
EOSINOPHIL # BLD AUTO: 0 K/UL (ref 0–0.5)
EOSINOPHIL NFR BLD: 0.2 % (ref 0–8)
ERYTHROCYTE [DISTWIDTH] IN BLOOD BY AUTOMATED COUNT: 12.2 % (ref 11.5–14.5)
EST. GFR  (AFRICAN AMERICAN): >60 ML/MIN/1.73 M^2
EST. GFR  (NON AFRICAN AMERICAN): >60 ML/MIN/1.73 M^2
FERRITIN SERPL-MCNC: 439 NG/ML (ref 20–300)
GLUCOSE SERPL-MCNC: 173 MG/DL (ref 70–110)
GLUCOSE SERPL-MCNC: 226 MG/DL (ref 70–110)
GLUCOSE SERPL-MCNC: 234 MG/DL (ref 70–110)
GLUCOSE SERPL-MCNC: 344 MG/DL (ref 70–110)
GLUCOSE SERPL-MCNC: 413 MG/DL (ref 70–110)
HCT VFR BLD AUTO: 37.1 % (ref 40–54)
HGB BLD-MCNC: 12.5 G/DL (ref 14–18)
IMM GRANULOCYTES # BLD AUTO: 0.08 K/UL (ref 0–0.04)
IMM GRANULOCYTES NFR BLD AUTO: 0.8 % (ref 0–0.5)
LDH SERPL L TO P-CCNC: 271 U/L (ref 110–260)
LYMPHOCYTES # BLD AUTO: 1.3 K/UL (ref 1–4.8)
LYMPHOCYTES NFR BLD: 11.9 % (ref 18–48)
MAGNESIUM SERPL-MCNC: 2.1 MG/DL (ref 1.6–2.6)
MCH RBC QN AUTO: 31.5 PG (ref 27–31)
MCHC RBC AUTO-ENTMCNC: 33.7 G/DL (ref 32–36)
MCV RBC AUTO: 94 FL (ref 82–98)
MONOCYTES # BLD AUTO: 0.3 K/UL (ref 0.3–1)
MONOCYTES NFR BLD: 3 % (ref 4–15)
NEUTROPHILS # BLD AUTO: 8.8 K/UL (ref 1.8–7.7)
NEUTROPHILS NFR BLD: 84 % (ref 38–73)
NRBC BLD-RTO: 0 /100 WBC
PHOSPHATE SERPL-MCNC: 2.8 MG/DL (ref 2.7–4.5)
PLATELET # BLD AUTO: 410 K/UL (ref 150–350)
PMV BLD AUTO: 10.2 FL (ref 9.2–12.9)
POTASSIUM SERPL-SCNC: 3.9 MMOL/L (ref 3.5–5.1)
PROT SERPL-MCNC: 7.2 G/DL (ref 6–8.4)
RBC # BLD AUTO: 3.97 M/UL (ref 4.6–6.2)
SODIUM SERPL-SCNC: 138 MMOL/L (ref 136–145)
WBC # BLD AUTO: 10.52 K/UL (ref 3.9–12.7)

## 2021-02-28 PROCEDURE — 94761 N-INVAS EAR/PLS OXIMETRY MLT: CPT

## 2021-02-28 PROCEDURE — 25000003 PHARM REV CODE 250: Performed by: INTERNAL MEDICINE

## 2021-02-28 PROCEDURE — 84100 ASSAY OF PHOSPHORUS: CPT

## 2021-02-28 PROCEDURE — 25000242 PHARM REV CODE 250 ALT 637 W/ HCPCS: Performed by: NURSE PRACTITIONER

## 2021-02-28 PROCEDURE — 85379 FIBRIN DEGRADATION QUANT: CPT

## 2021-02-28 PROCEDURE — 21400001 HC TELEMETRY ROOM

## 2021-02-28 PROCEDURE — 82962 GLUCOSE BLOOD TEST: CPT

## 2021-02-28 PROCEDURE — 12000002 HC ACUTE/MED SURGE SEMI-PRIVATE ROOM

## 2021-02-28 PROCEDURE — 27000221 HC OXYGEN, UP TO 24 HOURS

## 2021-02-28 PROCEDURE — 85025 COMPLETE CBC W/AUTO DIFF WBC: CPT

## 2021-02-28 PROCEDURE — 36415 COLL VENOUS BLD VENIPUNCTURE: CPT

## 2021-02-28 PROCEDURE — 86140 C-REACTIVE PROTEIN: CPT

## 2021-02-28 PROCEDURE — 94799 UNLISTED PULMONARY SVC/PX: CPT

## 2021-02-28 PROCEDURE — 63600175 PHARM REV CODE 636 W HCPCS: Performed by: NURSE PRACTITIONER

## 2021-02-28 PROCEDURE — 80053 COMPREHEN METABOLIC PANEL: CPT

## 2021-02-28 PROCEDURE — 83735 ASSAY OF MAGNESIUM: CPT

## 2021-02-28 PROCEDURE — 83615 LACTATE (LD) (LDH) ENZYME: CPT

## 2021-02-28 PROCEDURE — 82728 ASSAY OF FERRITIN: CPT

## 2021-02-28 PROCEDURE — 99900035 HC TECH TIME PER 15 MIN (STAT)

## 2021-02-28 PROCEDURE — 25000003 PHARM REV CODE 250: Performed by: NURSE PRACTITIONER

## 2021-02-28 RX ORDER — ZINC SULFATE 50(220)MG
220 CAPSULE ORAL DAILY
Status: DISCONTINUED | OUTPATIENT
Start: 2021-02-28 | End: 2021-03-03 | Stop reason: HOSPADM

## 2021-02-28 RX ADMIN — INSULIN DETEMIR 10 UNITS: 100 INJECTION, SOLUTION SUBCUTANEOUS at 10:02

## 2021-02-28 RX ADMIN — GLIMEPIRIDE 2 MG: 1 TABLET ORAL at 10:02

## 2021-02-28 RX ADMIN — OXYCODONE HYDROCHLORIDE AND ACETAMINOPHEN 500 MG: 500 TABLET ORAL at 09:02

## 2021-02-28 RX ADMIN — ENOXAPARIN SODIUM 40 MG: 40 INJECTION SUBCUTANEOUS at 10:02

## 2021-02-28 RX ADMIN — INSULIN ASPART 8 UNITS: 100 INJECTION, SOLUTION INTRAVENOUS; SUBCUTANEOUS at 04:02

## 2021-02-28 RX ADMIN — INSULIN ASPART 5 UNITS: 100 INJECTION, SOLUTION INTRAVENOUS; SUBCUTANEOUS at 10:02

## 2021-02-28 RX ADMIN — ENOXAPARIN SODIUM 40 MG: 40 INJECTION SUBCUTANEOUS at 09:02

## 2021-02-28 RX ADMIN — Medication 1000 UNITS: at 09:02

## 2021-02-28 RX ADMIN — LOSARTAN POTASSIUM 50 MG: 50 TABLET, FILM COATED ORAL at 09:02

## 2021-02-28 RX ADMIN — INSULIN ASPART 2 UNITS: 100 INJECTION, SOLUTION INTRAVENOUS; SUBCUTANEOUS at 09:02

## 2021-02-28 RX ADMIN — ZINC SULFATE 220 MG (50 MG) CAPSULE 220 MG: CAPSULE at 01:02

## 2021-02-28 RX ADMIN — ESCITALOPRAM OXALATE 10 MG: 10 TABLET ORAL at 09:02

## 2021-02-28 RX ADMIN — ASPIRIN 325 MG ORAL TABLET 325 MG: 325 PILL ORAL at 09:02

## 2021-02-28 RX ADMIN — DEXAMETHASONE 6 MG: 2 TABLET ORAL at 09:02

## 2021-02-28 RX ADMIN — METOPROLOL SUCCINATE 75 MG: 50 TABLET, FILM COATED, EXTENDED RELEASE ORAL at 09:02

## 2021-02-28 RX ADMIN — THERA TABS 1 TABLET: TAB at 09:02

## 2021-02-28 RX ADMIN — SIMVASTATIN 20 MG: 20 TABLET, FILM COATED ORAL at 10:02

## 2021-02-28 RX ADMIN — LEVOTHYROXINE SODIUM 150 MCG: 25 TABLET ORAL at 06:02

## 2021-02-28 RX ADMIN — INSULIN ASPART 4 UNITS: 100 INJECTION, SOLUTION INTRAVENOUS; SUBCUTANEOUS at 12:02

## 2021-02-28 RX ADMIN — GLIMEPIRIDE 2 MG: 1 TABLET ORAL at 09:02

## 2021-03-01 LAB
CRP SERPL-MCNC: 10.78 MG/DL
D DIMER PPP IA.FEU-MCNC: 0.84 UG/ML FEU
FERRITIN SERPL-MCNC: 406 NG/ML (ref 20–300)
GLUCOSE SERPL-MCNC: 242 MG/DL (ref 70–110)
GLUCOSE SERPL-MCNC: 272 MG/DL (ref 70–110)
GLUCOSE SERPL-MCNC: 374 MG/DL (ref 70–110)
GLUCOSE SERPL-MCNC: 381 MG/DL (ref 70–110)
LDH SERPL L TO P-CCNC: 257 U/L (ref 110–260)

## 2021-03-01 PROCEDURE — 12000002 HC ACUTE/MED SURGE SEMI-PRIVATE ROOM

## 2021-03-01 PROCEDURE — 83615 LACTATE (LD) (LDH) ENZYME: CPT

## 2021-03-01 PROCEDURE — 94799 UNLISTED PULMONARY SVC/PX: CPT

## 2021-03-01 PROCEDURE — 27000221 HC OXYGEN, UP TO 24 HOURS

## 2021-03-01 PROCEDURE — 36415 COLL VENOUS BLD VENIPUNCTURE: CPT

## 2021-03-01 PROCEDURE — 86140 C-REACTIVE PROTEIN: CPT

## 2021-03-01 PROCEDURE — 94761 N-INVAS EAR/PLS OXIMETRY MLT: CPT

## 2021-03-01 PROCEDURE — 99900035 HC TECH TIME PER 15 MIN (STAT)

## 2021-03-01 PROCEDURE — 25000003 PHARM REV CODE 250: Performed by: INTERNAL MEDICINE

## 2021-03-01 PROCEDURE — 25000242 PHARM REV CODE 250 ALT 637 W/ HCPCS: Performed by: NURSE PRACTITIONER

## 2021-03-01 PROCEDURE — 21400001 HC TELEMETRY ROOM

## 2021-03-01 PROCEDURE — 25000003 PHARM REV CODE 250: Performed by: NURSE PRACTITIONER

## 2021-03-01 PROCEDURE — 82962 GLUCOSE BLOOD TEST: CPT

## 2021-03-01 PROCEDURE — 85379 FIBRIN DEGRADATION QUANT: CPT

## 2021-03-01 PROCEDURE — 63600175 PHARM REV CODE 636 W HCPCS: Performed by: NURSE PRACTITIONER

## 2021-03-01 PROCEDURE — 82728 ASSAY OF FERRITIN: CPT

## 2021-03-01 RX ADMIN — THERA TABS 1 TABLET: TAB at 09:03

## 2021-03-01 RX ADMIN — LOSARTAN POTASSIUM 50 MG: 50 TABLET, FILM COATED ORAL at 09:03

## 2021-03-01 RX ADMIN — INSULIN ASPART 6 UNITS: 100 INJECTION, SOLUTION INTRAVENOUS; SUBCUTANEOUS at 12:03

## 2021-03-01 RX ADMIN — INSULIN ASPART 10 UNITS: 100 INJECTION, SOLUTION INTRAVENOUS; SUBCUTANEOUS at 04:03

## 2021-03-01 RX ADMIN — INSULIN DETEMIR 10 UNITS: 100 INJECTION, SOLUTION SUBCUTANEOUS at 09:03

## 2021-03-01 RX ADMIN — INSULIN ASPART 5 UNITS: 100 INJECTION, SOLUTION INTRAVENOUS; SUBCUTANEOUS at 09:03

## 2021-03-01 RX ADMIN — SIMVASTATIN 20 MG: 20 TABLET, FILM COATED ORAL at 09:03

## 2021-03-01 RX ADMIN — ESCITALOPRAM OXALATE 10 MG: 10 TABLET ORAL at 09:03

## 2021-03-01 RX ADMIN — DEXAMETHASONE 6 MG: 2 TABLET ORAL at 09:03

## 2021-03-01 RX ADMIN — GLIMEPIRIDE 2 MG: 1 TABLET ORAL at 09:03

## 2021-03-01 RX ADMIN — ZINC SULFATE 220 MG (50 MG) CAPSULE 220 MG: CAPSULE at 09:03

## 2021-03-01 RX ADMIN — Medication 1000 UNITS: at 09:03

## 2021-03-01 RX ADMIN — OXYCODONE HYDROCHLORIDE AND ACETAMINOPHEN 500 MG: 500 TABLET ORAL at 09:03

## 2021-03-01 RX ADMIN — INSULIN ASPART 4 UNITS: 100 INJECTION, SOLUTION INTRAVENOUS; SUBCUTANEOUS at 09:03

## 2021-03-01 RX ADMIN — ENOXAPARIN SODIUM 40 MG: 40 INJECTION SUBCUTANEOUS at 09:03

## 2021-03-01 RX ADMIN — LEVOTHYROXINE SODIUM 150 MCG: 25 TABLET ORAL at 06:03

## 2021-03-01 RX ADMIN — ASPIRIN 325 MG ORAL TABLET 325 MG: 325 PILL ORAL at 09:03

## 2021-03-01 RX ADMIN — METOPROLOL SUCCINATE 75 MG: 50 TABLET, FILM COATED, EXTENDED RELEASE ORAL at 09:03

## 2021-03-02 LAB
ANION GAP SERPL CALC-SCNC: 11 MMOL/L (ref 8–16)
BASOPHILS # BLD AUTO: 0.01 K/UL (ref 0–0.2)
BASOPHILS NFR BLD: 0.1 % (ref 0–1.9)
BUN SERPL-MCNC: 24 MG/DL (ref 8–23)
CALCIUM SERPL-MCNC: 8.5 MG/DL (ref 8.7–10.5)
CHLORIDE SERPL-SCNC: 103 MMOL/L (ref 95–110)
CO2 SERPL-SCNC: 22 MMOL/L (ref 23–29)
CREAT SERPL-MCNC: 0.7 MG/DL (ref 0.5–1.4)
CRP SERPL-MCNC: 6.54 MG/DL
D DIMER PPP IA.FEU-MCNC: 0.57 UG/ML FEU
DIFFERENTIAL METHOD: ABNORMAL
EOSINOPHIL # BLD AUTO: 0.2 K/UL (ref 0–0.5)
EOSINOPHIL NFR BLD: 1.6 % (ref 0–8)
ERYTHROCYTE [DISTWIDTH] IN BLOOD BY AUTOMATED COUNT: 12.3 % (ref 11.5–14.5)
EST. GFR  (AFRICAN AMERICAN): >60 ML/MIN/1.73 M^2
EST. GFR  (NON AFRICAN AMERICAN): >60 ML/MIN/1.73 M^2
FERRITIN SERPL-MCNC: 514 NG/ML (ref 20–300)
GLUCOSE SERPL-MCNC: 214 MG/DL (ref 70–110)
GLUCOSE SERPL-MCNC: 261 MG/DL (ref 70–110)
GLUCOSE SERPL-MCNC: 316 MG/DL (ref 70–110)
GLUCOSE SERPL-MCNC: 370 MG/DL (ref 70–110)
HCT VFR BLD AUTO: 37.6 % (ref 40–54)
HGB BLD-MCNC: 12.9 G/DL (ref 14–18)
IMM GRANULOCYTES # BLD AUTO: 0.09 K/UL (ref 0–0.04)
IMM GRANULOCYTES NFR BLD AUTO: 0.7 % (ref 0–0.5)
LDH SERPL L TO P-CCNC: 235 U/L (ref 110–260)
LYMPHOCYTES # BLD AUTO: 1.2 K/UL (ref 1–4.8)
LYMPHOCYTES NFR BLD: 9.9 % (ref 18–48)
MAGNESIUM SERPL-MCNC: 2.1 MG/DL (ref 1.6–2.6)
MCH RBC QN AUTO: 31.3 PG (ref 27–31)
MCHC RBC AUTO-ENTMCNC: 34.3 G/DL (ref 32–36)
MCV RBC AUTO: 91 FL (ref 82–98)
MONOCYTES # BLD AUTO: 0.4 K/UL (ref 0.3–1)
MONOCYTES NFR BLD: 3.2 % (ref 4–15)
NEUTROPHILS # BLD AUTO: 10.2 K/UL (ref 1.8–7.7)
NEUTROPHILS NFR BLD: 84.5 % (ref 38–73)
NRBC BLD-RTO: 0 /100 WBC
PLATELET # BLD AUTO: 503 K/UL (ref 150–350)
PMV BLD AUTO: 10.1 FL (ref 9.2–12.9)
POTASSIUM SERPL-SCNC: 3.7 MMOL/L (ref 3.5–5.1)
RBC # BLD AUTO: 4.12 M/UL (ref 4.6–6.2)
SODIUM SERPL-SCNC: 136 MMOL/L (ref 136–145)
WBC # BLD AUTO: 12.08 K/UL (ref 3.9–12.7)

## 2021-03-02 PROCEDURE — 36415 COLL VENOUS BLD VENIPUNCTURE: CPT

## 2021-03-02 PROCEDURE — 86140 C-REACTIVE PROTEIN: CPT

## 2021-03-02 PROCEDURE — 25000003 PHARM REV CODE 250: Performed by: NURSE PRACTITIONER

## 2021-03-02 PROCEDURE — 83735 ASSAY OF MAGNESIUM: CPT

## 2021-03-02 PROCEDURE — 99900031 HC PATIENT EDUCATION (STAT)

## 2021-03-02 PROCEDURE — 94799 UNLISTED PULMONARY SVC/PX: CPT

## 2021-03-02 PROCEDURE — 12000002 HC ACUTE/MED SURGE SEMI-PRIVATE ROOM

## 2021-03-02 PROCEDURE — 82728 ASSAY OF FERRITIN: CPT

## 2021-03-02 PROCEDURE — 80048 BASIC METABOLIC PNL TOTAL CA: CPT

## 2021-03-02 PROCEDURE — 85025 COMPLETE CBC W/AUTO DIFF WBC: CPT

## 2021-03-02 PROCEDURE — 85379 FIBRIN DEGRADATION QUANT: CPT

## 2021-03-02 PROCEDURE — 25000003 PHARM REV CODE 250: Performed by: INTERNAL MEDICINE

## 2021-03-02 PROCEDURE — 25000242 PHARM REV CODE 250 ALT 637 W/ HCPCS: Performed by: NURSE PRACTITIONER

## 2021-03-02 PROCEDURE — 83615 LACTATE (LD) (LDH) ENZYME: CPT

## 2021-03-02 PROCEDURE — 27000221 HC OXYGEN, UP TO 24 HOURS

## 2021-03-02 PROCEDURE — 99900035 HC TECH TIME PER 15 MIN (STAT)

## 2021-03-02 PROCEDURE — 63600175 PHARM REV CODE 636 W HCPCS: Performed by: NURSE PRACTITIONER

## 2021-03-02 PROCEDURE — 94761 N-INVAS EAR/PLS OXIMETRY MLT: CPT

## 2021-03-02 RX ADMIN — DEXAMETHASONE 6 MG: 2 TABLET ORAL at 08:03

## 2021-03-02 RX ADMIN — OXYCODONE HYDROCHLORIDE AND ACETAMINOPHEN 500 MG: 500 TABLET ORAL at 09:03

## 2021-03-02 RX ADMIN — ZINC SULFATE 220 MG (50 MG) CAPSULE 220 MG: CAPSULE at 08:03

## 2021-03-02 RX ADMIN — LEVOTHYROXINE SODIUM 150 MCG: 25 TABLET ORAL at 05:03

## 2021-03-02 RX ADMIN — SIMVASTATIN 20 MG: 20 TABLET, FILM COATED ORAL at 10:03

## 2021-03-02 RX ADMIN — METOPROLOL SUCCINATE 75 MG: 50 TABLET, FILM COATED, EXTENDED RELEASE ORAL at 08:03

## 2021-03-02 RX ADMIN — INSULIN ASPART 5 UNITS: 100 INJECTION, SOLUTION INTRAVENOUS; SUBCUTANEOUS at 10:03

## 2021-03-02 RX ADMIN — INSULIN DETEMIR 10 UNITS: 100 INJECTION, SOLUTION SUBCUTANEOUS at 10:03

## 2021-03-02 RX ADMIN — INSULIN ASPART 8 UNITS: 100 INJECTION, SOLUTION INTRAVENOUS; SUBCUTANEOUS at 12:03

## 2021-03-02 RX ADMIN — THERA TABS 1 TABLET: TAB at 08:03

## 2021-03-02 RX ADMIN — ASPIRIN 325 MG ORAL TABLET 325 MG: 325 PILL ORAL at 08:03

## 2021-03-02 RX ADMIN — ESCITALOPRAM OXALATE 10 MG: 10 TABLET ORAL at 08:03

## 2021-03-02 RX ADMIN — ACETAMINOPHEN 650 MG: 325 TABLET, FILM COATED ORAL at 05:03

## 2021-03-02 RX ADMIN — ENOXAPARIN SODIUM 40 MG: 40 INJECTION SUBCUTANEOUS at 10:03

## 2021-03-02 RX ADMIN — LOSARTAN POTASSIUM 50 MG: 50 TABLET, FILM COATED ORAL at 08:03

## 2021-03-02 RX ADMIN — GLIMEPIRIDE 2 MG: 1 TABLET ORAL at 10:03

## 2021-03-02 RX ADMIN — ENOXAPARIN SODIUM 40 MG: 40 INJECTION SUBCUTANEOUS at 08:03

## 2021-03-02 RX ADMIN — Medication 1000 UNITS: at 08:03

## 2021-03-02 RX ADMIN — OXYCODONE HYDROCHLORIDE AND ACETAMINOPHEN 500 MG: 500 TABLET ORAL at 10:03

## 2021-03-02 RX ADMIN — GLIMEPIRIDE 2 MG: 1 TABLET ORAL at 08:03

## 2021-03-02 RX ADMIN — INSULIN ASPART 4 UNITS: 100 INJECTION, SOLUTION INTRAVENOUS; SUBCUTANEOUS at 08:03

## 2021-03-03 VITALS
WEIGHT: 246.06 LBS | DIASTOLIC BLOOD PRESSURE: 57 MMHG | SYSTOLIC BLOOD PRESSURE: 120 MMHG | HEART RATE: 70 BPM | RESPIRATION RATE: 18 BRPM | BODY MASS INDEX: 34.45 KG/M2 | TEMPERATURE: 98 F | OXYGEN SATURATION: 94 % | HEIGHT: 71 IN

## 2021-03-03 LAB
BASOPHILS # BLD AUTO: 0.02 K/UL (ref 0–0.2)
BASOPHILS NFR BLD: 0.2 % (ref 0–1.9)
DIFFERENTIAL METHOD: ABNORMAL
EOSINOPHIL # BLD AUTO: 0.2 K/UL (ref 0–0.5)
EOSINOPHIL NFR BLD: 1.6 % (ref 0–8)
ERYTHROCYTE [DISTWIDTH] IN BLOOD BY AUTOMATED COUNT: 12.4 % (ref 11.5–14.5)
GLUCOSE SERPL-MCNC: 246 MG/DL (ref 70–110)
GLUCOSE SERPL-MCNC: 287 MG/DL (ref 70–110)
GLUCOSE SERPL-MCNC: 366 MG/DL (ref 70–110)
HCT VFR BLD AUTO: 36.8 % (ref 40–54)
HGB BLD-MCNC: 12.5 G/DL (ref 14–18)
IMM GRANULOCYTES # BLD AUTO: 0.13 K/UL (ref 0–0.04)
IMM GRANULOCYTES NFR BLD AUTO: 1.2 % (ref 0–0.5)
LYMPHOCYTES # BLD AUTO: 1.4 K/UL (ref 1–4.8)
LYMPHOCYTES NFR BLD: 13.1 % (ref 18–48)
MCH RBC QN AUTO: 31.4 PG (ref 27–31)
MCHC RBC AUTO-ENTMCNC: 34 G/DL (ref 32–36)
MCV RBC AUTO: 93 FL (ref 82–98)
MONOCYTES # BLD AUTO: 0.4 K/UL (ref 0.3–1)
MONOCYTES NFR BLD: 3.9 % (ref 4–15)
NEUTROPHILS # BLD AUTO: 8.7 K/UL (ref 1.8–7.7)
NEUTROPHILS NFR BLD: 80 % (ref 38–73)
NRBC BLD-RTO: 0 /100 WBC
PLATELET # BLD AUTO: 521 K/UL (ref 150–350)
PMV BLD AUTO: 9.9 FL (ref 9.2–12.9)
RBC # BLD AUTO: 3.98 M/UL (ref 4.6–6.2)
WBC # BLD AUTO: 10.85 K/UL (ref 3.9–12.7)

## 2021-03-03 PROCEDURE — 25000003 PHARM REV CODE 250: Performed by: INTERNAL MEDICINE

## 2021-03-03 PROCEDURE — 25000003 PHARM REV CODE 250: Performed by: NURSE PRACTITIONER

## 2021-03-03 PROCEDURE — 25000242 PHARM REV CODE 250 ALT 637 W/ HCPCS: Performed by: NURSE PRACTITIONER

## 2021-03-03 PROCEDURE — 94618 PULMONARY STRESS TESTING: CPT

## 2021-03-03 PROCEDURE — 94761 N-INVAS EAR/PLS OXIMETRY MLT: CPT

## 2021-03-03 PROCEDURE — 82962 GLUCOSE BLOOD TEST: CPT

## 2021-03-03 PROCEDURE — 99900035 HC TECH TIME PER 15 MIN (STAT)

## 2021-03-03 PROCEDURE — 36415 COLL VENOUS BLD VENIPUNCTURE: CPT

## 2021-03-03 PROCEDURE — 85025 COMPLETE CBC W/AUTO DIFF WBC: CPT

## 2021-03-03 PROCEDURE — 63600175 PHARM REV CODE 636 W HCPCS: Performed by: NURSE PRACTITIONER

## 2021-03-03 PROCEDURE — 27000221 HC OXYGEN, UP TO 24 HOURS

## 2021-03-03 RX ORDER — DEXAMETHASONE 6 MG/1
6 TABLET ORAL DAILY
Qty: 2 TABLET | Refills: 0 | Status: SHIPPED | OUTPATIENT
Start: 2021-03-04 | End: 2021-03-14

## 2021-03-03 RX ORDER — ALBUTEROL SULFATE 90 UG/1
2 AEROSOL, METERED RESPIRATORY (INHALATION) EVERY 6 HOURS PRN
Qty: 8 G | Refills: 2 | Status: SHIPPED | OUTPATIENT
Start: 2021-03-03

## 2021-03-03 RX ADMIN — INSULIN ASPART 10 UNITS: 100 INJECTION, SOLUTION INTRAVENOUS; SUBCUTANEOUS at 04:03

## 2021-03-03 RX ADMIN — LOSARTAN POTASSIUM 50 MG: 50 TABLET, FILM COATED ORAL at 08:03

## 2021-03-03 RX ADMIN — THERA TABS 1 TABLET: TAB at 08:03

## 2021-03-03 RX ADMIN — GLIMEPIRIDE 2 MG: 1 TABLET ORAL at 08:03

## 2021-03-03 RX ADMIN — INSULIN ASPART 4 UNITS: 100 INJECTION, SOLUTION INTRAVENOUS; SUBCUTANEOUS at 08:03

## 2021-03-03 RX ADMIN — DEXAMETHASONE 6 MG: 2 TABLET ORAL at 08:03

## 2021-03-03 RX ADMIN — ESCITALOPRAM OXALATE 10 MG: 10 TABLET ORAL at 08:03

## 2021-03-03 RX ADMIN — ZINC SULFATE 220 MG (50 MG) CAPSULE 220 MG: CAPSULE at 08:03

## 2021-03-03 RX ADMIN — ASPIRIN 325 MG ORAL TABLET 325 MG: 325 PILL ORAL at 08:03

## 2021-03-03 RX ADMIN — METOPROLOL SUCCINATE 75 MG: 50 TABLET, FILM COATED, EXTENDED RELEASE ORAL at 08:03

## 2021-03-03 RX ADMIN — OXYCODONE HYDROCHLORIDE AND ACETAMINOPHEN 500 MG: 500 TABLET ORAL at 09:03

## 2021-03-03 RX ADMIN — Medication 1000 UNITS: at 08:03

## 2021-03-03 RX ADMIN — LEVOTHYROXINE SODIUM 150 MCG: 25 TABLET ORAL at 06:03

## 2021-03-03 RX ADMIN — ENOXAPARIN SODIUM 40 MG: 40 INJECTION SUBCUTANEOUS at 08:03

## 2021-03-10 ENCOUNTER — TELEPHONE (OUTPATIENT)
Dept: CARDIOLOGY | Facility: CLINIC | Age: 66
End: 2021-03-10

## 2021-03-11 ENCOUNTER — PATIENT OUTREACH (OUTPATIENT)
Dept: INFECTIOUS DISEASES | Facility: HOSPITAL | Age: 66
End: 2021-03-11

## 2021-03-26 ENCOUNTER — OFFICE VISIT (OUTPATIENT)
Dept: CARDIOLOGY | Facility: CLINIC | Age: 66
End: 2021-03-26
Payer: MEDICARE

## 2021-03-26 VITALS
WEIGHT: 240 LBS | OXYGEN SATURATION: 95 % | HEART RATE: 66 BPM | SYSTOLIC BLOOD PRESSURE: 140 MMHG | DIASTOLIC BLOOD PRESSURE: 80 MMHG | HEIGHT: 71 IN | BODY MASS INDEX: 33.6 KG/M2

## 2021-03-26 DIAGNOSIS — E11.65 TYPE 2 DIABETES MELLITUS WITH HYPERGLYCEMIA, WITHOUT LONG-TERM CURRENT USE OF INSULIN: Primary | ICD-10-CM

## 2021-03-26 DIAGNOSIS — I25.10 ASCVD (ARTERIOSCLEROTIC CARDIOVASCULAR DISEASE): ICD-10-CM

## 2021-03-26 DIAGNOSIS — R23.8 COVID TOES: ICD-10-CM

## 2021-03-26 DIAGNOSIS — U07.1 COVID TOES: ICD-10-CM

## 2021-03-26 PROCEDURE — 99215 PR OFFICE/OUTPT VISIT, EST, LEVL V, 40-54 MIN: ICD-10-PCS | Mod: CR,S$GLB,, | Performed by: SPECIALIST

## 2021-03-26 PROCEDURE — 99215 OFFICE O/P EST HI 40 MIN: CPT | Mod: CR,S$GLB,, | Performed by: SPECIALIST

## 2021-03-26 RX ORDER — NITROGLYCERIN 0.4 MG/1
0.4 TABLET SUBLINGUAL EVERY 5 MIN PRN
Qty: 25 TABLET | Refills: 6 | Status: SHIPPED | OUTPATIENT
Start: 2021-03-26 | End: 2023-05-03

## 2021-04-09 ENCOUNTER — TELEPHONE (OUTPATIENT)
Dept: CARDIOLOGY | Facility: CLINIC | Age: 66
End: 2021-04-09

## 2021-05-06 ENCOUNTER — PATIENT MESSAGE (OUTPATIENT)
Dept: RESEARCH | Facility: HOSPITAL | Age: 66
End: 2021-05-06

## 2021-05-25 ENCOUNTER — OFFICE VISIT (OUTPATIENT)
Dept: DERMATOLOGY | Facility: CLINIC | Age: 66
End: 2021-05-25
Payer: MEDICARE

## 2021-05-25 DIAGNOSIS — L21.9 SEBORRHEIC DERMATITIS: Primary | ICD-10-CM

## 2021-05-25 PROCEDURE — 99999 PR PBB SHADOW E&M-EST. PATIENT-LVL III: ICD-10-PCS | Mod: PBBFAC,,, | Performed by: STUDENT IN AN ORGANIZED HEALTH CARE EDUCATION/TRAINING PROGRAM

## 2021-05-25 PROCEDURE — 99999 PR PBB SHADOW E&M-EST. PATIENT-LVL III: CPT | Mod: PBBFAC,,, | Performed by: STUDENT IN AN ORGANIZED HEALTH CARE EDUCATION/TRAINING PROGRAM

## 2021-05-25 PROCEDURE — 99204 OFFICE O/P NEW MOD 45 MIN: CPT | Mod: S$PBB,,, | Performed by: STUDENT IN AN ORGANIZED HEALTH CARE EDUCATION/TRAINING PROGRAM

## 2021-05-25 PROCEDURE — 99204 PR OFFICE/OUTPT VISIT, NEW, LEVL IV, 45-59 MIN: ICD-10-PCS | Mod: S$PBB,,, | Performed by: STUDENT IN AN ORGANIZED HEALTH CARE EDUCATION/TRAINING PROGRAM

## 2021-05-25 PROCEDURE — 99213 OFFICE O/P EST LOW 20 MIN: CPT | Mod: PBBFAC,PO | Performed by: STUDENT IN AN ORGANIZED HEALTH CARE EDUCATION/TRAINING PROGRAM

## 2021-05-25 RX ORDER — KETOCONAZOLE 20 MG/G
CREAM TOPICAL 2 TIMES DAILY
Qty: 60 G | Refills: 2 | Status: SHIPPED | OUTPATIENT
Start: 2021-05-25

## 2021-05-25 RX ORDER — FLUOCINOLONE ACETONIDE 0.11 MG/ML
OIL TOPICAL 2 TIMES DAILY
Qty: 118 ML | Refills: 1 | Status: SHIPPED | OUTPATIENT
Start: 2021-05-25

## 2021-05-25 RX ORDER — HYDROCORTISONE 25 MG/G
CREAM TOPICAL 2 TIMES DAILY
Qty: 28 G | Refills: 2 | Status: SHIPPED | OUTPATIENT
Start: 2021-05-25 | End: 2021-06-08

## 2021-05-28 RX ORDER — METOPROLOL SUCCINATE 50 MG/1
75 TABLET, EXTENDED RELEASE ORAL DAILY
Qty: 135 TABLET | Refills: 3 | Status: SHIPPED | OUTPATIENT
Start: 2021-05-28 | End: 2021-06-14 | Stop reason: SDUPTHER

## 2021-06-15 RX ORDER — METOPROLOL SUCCINATE 50 MG/1
75 TABLET, EXTENDED RELEASE ORAL DAILY
Qty: 135 TABLET | Refills: 3 | Status: SHIPPED | OUTPATIENT
Start: 2021-06-15 | End: 2022-07-05

## 2021-06-26 LAB
CHOLEST SERPL-MCNC: 118 MG/DL
CHOLEST/HDLC SERPL: 3.7 (CALC)
HDLC SERPL-MCNC: 32 MG/DL
LDLC SERPL CALC-MCNC: 60 MG/DL (CALC)
NONHDLC SERPL-MCNC: 86 MG/DL (CALC)
TRIGL SERPL-MCNC: 187 MG/DL

## 2021-06-30 ENCOUNTER — TELEPHONE (OUTPATIENT)
Dept: CARDIOLOGY | Facility: CLINIC | Age: 66
End: 2021-06-30

## 2021-07-28 ENCOUNTER — OFFICE VISIT (OUTPATIENT)
Dept: CARDIOLOGY | Facility: CLINIC | Age: 66
End: 2021-07-28
Payer: MEDICARE

## 2021-07-28 VITALS
SYSTOLIC BLOOD PRESSURE: 138 MMHG | DIASTOLIC BLOOD PRESSURE: 70 MMHG | WEIGHT: 252 LBS | HEIGHT: 71 IN | HEART RATE: 80 BPM | BODY MASS INDEX: 35.28 KG/M2

## 2021-07-28 DIAGNOSIS — I10 ESSENTIAL HYPERTENSION: ICD-10-CM

## 2021-07-28 DIAGNOSIS — I25.10 CORONARY ARTERY DISEASE INVOLVING NATIVE HEART, ANGINA PRESENCE UNSPECIFIED, UNSPECIFIED VESSEL OR LESION TYPE: Primary | ICD-10-CM

## 2021-07-28 DIAGNOSIS — E08.65 DIABETES MELLITUS DUE TO UNDERLYING CONDITION WITH HYPERGLYCEMIA, WITHOUT LONG-TERM CURRENT USE OF INSULIN: ICD-10-CM

## 2021-07-28 DIAGNOSIS — J41.0 SIMPLE CHRONIC BRONCHITIS: ICD-10-CM

## 2021-07-28 DIAGNOSIS — E78.5 DYSLIPIDEMIA: ICD-10-CM

## 2021-07-28 DIAGNOSIS — R06.02 SHORTNESS OF BREATH: Primary | ICD-10-CM

## 2021-07-28 DIAGNOSIS — R06.00 DYSPNEA: ICD-10-CM

## 2021-07-28 DIAGNOSIS — E07.9 THYROID DYSFUNCTION: ICD-10-CM

## 2021-07-28 PROBLEM — J44.9 COPD (CHRONIC OBSTRUCTIVE PULMONARY DISEASE): Status: ACTIVE | Noted: 2021-07-28

## 2021-07-28 PROCEDURE — 99214 OFFICE O/P EST MOD 30 MIN: CPT | Mod: S$GLB,,, | Performed by: INTERNAL MEDICINE

## 2021-07-28 PROCEDURE — 99214 PR OFFICE/OUTPT VISIT, EST, LEVL IV, 30-39 MIN: ICD-10-PCS | Mod: S$GLB,,, | Performed by: INTERNAL MEDICINE

## 2021-08-03 ENCOUNTER — HOSPITAL ENCOUNTER (OUTPATIENT)
Dept: RADIOLOGY | Facility: HOSPITAL | Age: 66
Discharge: HOME | End: 2021-08-03
Attending: INTERNAL MEDICINE
Payer: MEDICARE

## 2021-08-03 DIAGNOSIS — R06.02 SHORTNESS OF BREATH: ICD-10-CM

## 2021-08-03 DIAGNOSIS — R06.00 DYSPNEA: ICD-10-CM

## 2021-08-03 PROCEDURE — 71046 X-RAY EXAM CHEST 2 VIEWS: CPT | Mod: TC,PO

## 2021-08-23 RX ORDER — POTASSIUM CHLORIDE 750 MG/1
10 CAPSULE, EXTENDED RELEASE ORAL DAILY
Qty: 90 CAPSULE | Refills: 3 | Status: SHIPPED | OUTPATIENT
Start: 2021-08-23 | End: 2022-09-07

## 2021-10-06 RX ORDER — SIMVASTATIN 20 MG/1
20 TABLET, FILM COATED ORAL NIGHTLY
Qty: 90 TABLET | Refills: 3 | Status: SHIPPED | OUTPATIENT
Start: 2021-10-06 | End: 2022-10-03 | Stop reason: SDUPTHER

## 2021-10-06 RX ORDER — LEVOTHYROXINE SODIUM 150 UG/1
150 TABLET ORAL DAILY
Qty: 90 TABLET | Refills: 3 | Status: SHIPPED | OUTPATIENT
Start: 2021-10-06 | End: 2023-11-08 | Stop reason: DRUGHIGH

## 2021-10-08 RX ORDER — LOSARTAN POTASSIUM 50 MG/1
50 TABLET ORAL DAILY
Qty: 90 TABLET | Refills: 3 | Status: SHIPPED | OUTPATIENT
Start: 2021-10-08 | End: 2022-12-20 | Stop reason: SDUPTHER

## 2022-02-02 ENCOUNTER — OFFICE VISIT (OUTPATIENT)
Dept: CARDIOLOGY | Facility: CLINIC | Age: 67
End: 2022-02-02
Payer: MEDICARE

## 2022-02-02 VITALS
HEART RATE: 80 BPM | DIASTOLIC BLOOD PRESSURE: 80 MMHG | HEIGHT: 71 IN | WEIGHT: 251 LBS | BODY MASS INDEX: 35.14 KG/M2 | SYSTOLIC BLOOD PRESSURE: 140 MMHG

## 2022-02-02 DIAGNOSIS — E07.9 THYROID DYSFUNCTION: ICD-10-CM

## 2022-02-02 DIAGNOSIS — R94.31 NONSPECIFIC ABNORMAL ELECTROCARDIOGRAM (ECG) (EKG): ICD-10-CM

## 2022-02-02 DIAGNOSIS — I10 ESSENTIAL HYPERTENSION: ICD-10-CM

## 2022-02-02 DIAGNOSIS — E11.65 TYPE 2 DIABETES MELLITUS WITH HYPERGLYCEMIA, WITHOUT LONG-TERM CURRENT USE OF INSULIN: ICD-10-CM

## 2022-02-02 DIAGNOSIS — E08.65 DIABETES MELLITUS DUE TO UNDERLYING CONDITION WITH HYPERGLYCEMIA, WITHOUT LONG-TERM CURRENT USE OF INSULIN: ICD-10-CM

## 2022-02-02 DIAGNOSIS — E78.5 DYSLIPIDEMIA: ICD-10-CM

## 2022-02-02 DIAGNOSIS — I25.10 CORONARY ARTERY DISEASE INVOLVING NATIVE CORONARY ARTERY OF NATIVE HEART WITHOUT ANGINA PECTORIS: Primary | ICD-10-CM

## 2022-02-02 PROCEDURE — 99214 OFFICE O/P EST MOD 30 MIN: CPT | Mod: S$GLB,,, | Performed by: INTERNAL MEDICINE

## 2022-02-02 PROCEDURE — 93000 ELECTROCARDIOGRAM COMPLETE: CPT | Mod: S$GLB,,, | Performed by: INTERNAL MEDICINE

## 2022-02-02 PROCEDURE — 99214 PR OFFICE/OUTPT VISIT, EST, LEVL IV, 30-39 MIN: ICD-10-PCS | Mod: S$GLB,,, | Performed by: INTERNAL MEDICINE

## 2022-02-02 PROCEDURE — 93000 EKG 12-LEAD: ICD-10-PCS | Mod: S$GLB,,, | Performed by: INTERNAL MEDICINE

## 2022-02-02 RX ORDER — AMOXICILLIN 500 MG
1 CAPSULE ORAL DAILY
COMMUNITY

## 2022-02-02 RX ORDER — IBUPROFEN 100 MG/5ML
1000 SUSPENSION, ORAL (FINAL DOSE FORM) ORAL DAILY
COMMUNITY

## 2022-02-02 NOTE — PROGRESS NOTES
Subjective:    Patient ID:  Jigar Acharya is a 67 y.o. male patient here for evaluation Coronary Artery Disease and Hypertension      History of Present Illness:         Mr. Ramirez is here today for his follow up visit. He denies chest pain or SOB. No arm neck or jaw pain. He has CAD and has 5 stents in his heart. He tells me he had a stress test right before Dr. Cordero left. He is treated for DM, Thyroid disease by Dr. Onofre in [facility]. He has sleep apnea and is faithful with his CPAP. He has not had cardiac testing however since 2016. He had labs recently with Dr. Onofre and I will obtain a copy.     Review of patient's allergies indicates:   Allergen Reactions    Demerol [meperidine] Other (See Comments)     Low Blood Pressure       Past Medical History:   Diagnosis Date    Coronary artery disease 2004    Diabetes mellitus, type 2     Diverticulitis     GERD (gastroesophageal reflux disease)     Hyperlipidemia     Hypertension     Hypothyroidism 2003    Kidney stones     Sleep apnea      Past Surgical History:   Procedure Laterality Date    COLONOSCOPY  12/18/2019    COLONOSCOPY N/A 12/18/2019    Procedure: COLONOSCOPY;  Surgeon: Esequiel Eastman MD;  Location: [facility];  Service: Endoscopy;  Laterality: N/A;    Coronary Angiography  01/01/2004    CORONARY STENT PLACEMENT      HERNIA REPAIR      Inguinal     Social History     Tobacco Use    Smoking status: Former Smoker    Smokeless tobacco: Never Used   Substance Use Topics    Alcohol use: No        Review of Systems:    As noted in HPI in addition      REVIEW OF SYSTEMS  CARDIOVASCULAR: No recent chest pain, palpitations, arm, neck, or jaw pain  RESPIRATORY: No recent fever, cough chills, SOB or congestion  : No blood in the urine  GI: No Nausea, vomiting, constipation, diarrhea, blood, or reflux.  MUSCULOSKELETAL: No myalgias  NEURO: No lightheadedness or dizziness  EYES: No Double vision, blurry, vision or headache      +fatigue and tiredness         Objective        Vitals:    02/02/22 1446   BP: (!) 140/80   Pulse: 80       LIPIDS - LAST 2   Lab Results   Component Value Date    CHOL 118 06/25/2021    HDL 32 (L) 06/25/2021    LDLCALC 60 06/25/2021    TRIG 187 (H) 06/25/2021    CHOLHDL 3.7 06/25/2021       CBC - LAST 2  Lab Results   Component Value Date    WBC 10.85 03/03/2021    WBC 12.08 03/02/2021    RBC 3.98 (L) 03/03/2021    RBC 4.12 (L) 03/02/2021    HGB 12.5 (L) 03/03/2021    HGB 12.9 (L) 03/02/2021    HCT 36.8 (L) 03/03/2021    HCT 37.6 (L) 03/02/2021    MCV 93 03/03/2021    MCV 91 03/02/2021    MCH 31.4 (H) 03/03/2021    MCH 31.3 (H) 03/02/2021    MCHC 34.0 03/03/2021    MCHC 34.3 03/02/2021    RDW 12.4 03/03/2021    RDW 12.3 03/02/2021     (H) 03/03/2021     (H) 03/02/2021    MPV 9.9 03/03/2021    MPV 10.1 03/02/2021    GRAN 8.7 (H) 03/03/2021    GRAN 80.0 (H) 03/03/2021    LYMPH 1.4 03/03/2021    LYMPH 13.1 (L) 03/03/2021    MONO 0.4 03/03/2021    MONO 3.9 (L) 03/03/2021    BASO 0.02 03/03/2021    BASO 0.01 03/02/2021    NRBC 0 03/03/2021    NRBC 0 03/02/2021       CHEMISTRY & LIVER FUNCTION - LAST 2  Lab Results   Component Value Date     03/02/2021     02/28/2021    K 3.7 03/02/2021    K 3.9 02/28/2021     03/02/2021     02/28/2021    CO2 22 (L) 03/02/2021    CO2 23 02/28/2021    ANIONGAP 11 03/02/2021    ANIONGAP 10 02/28/2021    BUN 24 (H) 03/02/2021    BUN 19 02/28/2021    CREATININE 0.7 03/02/2021    CREATININE 0.6 02/28/2021     (H) 03/02/2021     (H) 02/28/2021    CALCIUM 8.5 (L) 03/02/2021    CALCIUM 8.5 (L) 02/28/2021    MG 2.1 03/02/2021    MG 2.1 02/28/2021    ALBUMIN 3.1 (L) 02/28/2021    ALBUMIN 3.2 (L) 02/27/2021    PROT 7.2 02/28/2021    PROT 7.1 02/27/2021    ALKPHOS 52 (L) 02/28/2021    ALKPHOS 51 (L) 02/27/2021    ALT 22 02/28/2021    ALT 20 02/27/2021    AST 29 02/28/2021    AST 33 02/27/2021    BILITOT 0.9 02/28/2021    BILITOT 0.8 02/27/2021         CARDIAC PROFILE - LAST 2  Lab Results   Component Value Date    BNP 29 02/23/2021     02/23/2021     03/02/2021     03/01/2021    TROPONINI <0.030 02/23/2021        COAGULATION - LAST 2  No results found for: LABPT, INR, APTT    ENDOCRINE & PSA - LAST 2  Lab Results   Component Value Date    PROCAL 0.13 02/23/2021    PSA 0.84 03/27/2018        ECHOCARDIOGRAM RESULTS  No results found for this or any previous visit.      CURRENT/PREVIOUS VISIT EKG  Results for orders placed or performed during the hospital encounter of 02/23/21   EKG 12-lead    Collection Time: 02/23/21  6:59 PM    Narrative    Test Reason : Z20.822,    Vent. Rate : 102 BPM     Atrial Rate : 102 BPM     P-R Int : 150 ms          QRS Dur : 108 ms      QT Int : 360 ms       P-R-T Axes : 040 004 030 degrees     QTc Int : 469 ms    Sinus tachycardia  Incomplete right bundle branch block  Borderline Abnormal ECG  Nonspecific ST and T wave abnormality  When compared with ECG of 26-FEB-2020 06:17,  No significant change was found  Confirmed by Ace Bueno MD ([id number]) on 3/7/2021 3:53:10 PM    Referred By: [email or web address]   SELF           Confirmed By:Ace Bueno MD         PHYSICAL EXAM  CONSTITUTIONAL: Well built, well nourished in no apparent distress  NECK: no carotid bruit, no JVD  LUNGS: CTA  CHEST WALL: no tenderness  HEART: regular rate and rhythm, S1, S2 normal, no murmur, click, rub or gallop   ABDOMEN: soft, non-tender; bowel sounds normal; no masses,  no organomegaly  EXTREMITIES: Extremities normal, no edema, no calf tenderness noted  NEURO: AAO X 3    I HAVE REVIEWED :    The vital signs, nurses notes, and all the pertinent radiology and labs.        Current Outpatient Medications   Medication Instructions    albuterol (VENTOLIN HFA) 90 mcg/actuation inhaler 2 puffs, Inhalation, Every 6 hours PRN, Rescue    ascorbic acid (vitamin C) (VITAMIN C) 1,000 mg, Oral, Daily    aspirin 325 mg, Oral, Daily    EScitalopram  oxalate (LEXAPRO) 10 mg, Oral, Daily    fluocinolone (DERMA-SMOOTHE) 0.01 % external oil Topical (Top), 2 times daily, Use on AA BID x 2 weeks    glimepiride (AMARYL) 2 mg, Oral, 2 times daily    hydrocortisone 2.5 % cream Topical (Top), 2 times daily, Use on AA BID x 10-14 days    ketoconazole (NIZORAL) 2 % cream Topical (Top), 2 times daily    levothyroxine (SYNTHROID) 150 mcg, Oral, Daily    losartan (COZAAR) 50 mg, Oral, Daily    metFORMIN (GLUCOPHAGE-XR) 1,000 mg, Oral, 2 times daily    metoprolol succinate (TOPROL-XL) 75 mg, Oral, Daily    nitroGLYCERIN (NITROSTAT) 0.4 mg, Sublingual, Every 5 min PRN    omega-3 fatty acids/fish oil (FISH OIL-OMEGA-3 FATTY ACIDS) 300-1,000 mg capsule 1 capsule, Oral, Daily    ondansetron (ZOFRAN) 4 mg, Oral, Every 6 hours PRN    ONETOUCH DELICA LANCETS 30 gauge Misc TEST BID    ONETOUCH VERIO Strp TEST BID    ONETOUCH VERIO SYSTEM Misc TEST D    potassium chloride (MICRO-K) 10 MEQ CpSR 10 mEq, Oral, Daily    simvastatin (ZOCOR) 20 mg, Oral, Nightly    TRULICITY 1.5 mg, Subcutaneous, Weekly          Assessment & Plan     Type 2 diabetes mellitus with hyperglycemia, without long-term current use of insulin  Per PCP    Nonspecific abnormal electrocardiogram (ECG) (EKG)  History of CAD with PCI X 5 per patient  Last testing in 2016  Obtain Stress Myoview  Obtain ECHO    Thyroid dysfunction  Per PCP    Essential hypertension  BP Stable continue current regimen to include Losartan 50 mg daily  Metoprolol 75 mg daily      Coronary artery disease involving native heart  Continue ASA  Statin  Recommend Stress Myoview and ECHO          No follow-ups on file.

## 2022-03-17 ENCOUNTER — HOSPITAL ENCOUNTER (OUTPATIENT)
Dept: CARDIOLOGY | Facility: CLINIC | Age: 67
Discharge: HOME | End: 2022-03-17
Attending: NURSE PRACTITIONER
Payer: MEDICARE

## 2022-03-17 ENCOUNTER — HOSPITAL ENCOUNTER (OUTPATIENT)
Dept: RADIOLOGY | Facility: CLINIC | Age: 67
Discharge: HOME | End: 2022-03-17
Attending: NURSE PRACTITIONER
Payer: MEDICARE

## 2022-03-17 DIAGNOSIS — R94.31 NONSPECIFIC ABNORMAL ELECTROCARDIOGRAM (ECG) (EKG): ICD-10-CM

## 2022-03-17 DIAGNOSIS — I25.10 CORONARY ARTERY DISEASE INVOLVING NATIVE CORONARY ARTERY OF NATIVE HEART WITHOUT ANGINA PECTORIS: ICD-10-CM

## 2022-03-17 LAB
CV STRESS BASE HR: 71 BPM
DIASTOLIC BLOOD PRESSURE: 72 MMHG
EJECTION FRACTION- HIGH: 65 %
END DIASTOLIC INDEX-HIGH: 158 ML/M2
END DIASTOLIC INDEX-LOW: 94 ML/M2
END SYSTOLIC INDEX-HIGH: 71 ML/M2
END SYSTOLIC INDEX-LOW: 33 ML/M2
NUC STRESS DIASTOLIC VOLUME INDEX: 95
NUC STRESS EJECTION FRACTION: 58 %
NUC STRESS SYSTOLIC VOLUME INDEX: 40
OHS CV CPX 1 MINUTE RECOVERY HEART RATE: 123 BPM
OHS CV CPX 85 PERCENT MAX PREDICTED HEART RATE MALE: 131
OHS CV CPX ESTIMATED METS: 7
OHS CV CPX MAX PREDICTED HEART RATE: 154
OHS CV CPX PATIENT IS FEMALE: 0
OHS CV CPX PATIENT IS MALE: 1
OHS CV CPX PEAK DIASTOLIC BLOOD PRESSURE: 80 MMHG
OHS CV CPX PEAK HEAR RATE: 141 BPM
OHS CV CPX PEAK RATE PRESSURE PRODUCT: NORMAL
OHS CV CPX PEAK SYSTOLIC BLOOD PRESSURE: 180 MMHG
OHS CV CPX PERCENT MAX PREDICTED HEART RATE ACHIEVED: 92
OHS CV CPX RATE PRESSURE PRODUCT PRESENTING: 8520
RETIRED EF AND QEF - SEE NOTES: 53 %
STRESS ECHO POST EXERCISE DUR MIN: 6 MINUTES
STRESS ECHO POST EXERCISE DUR SEC: 0 SECONDS
SYSTOLIC BLOOD PRESSURE: 120 MMHG

## 2022-03-17 PROCEDURE — A9502 TC99M TETROFOSMIN: HCPCS | Mod: S$GLB,,, | Performed by: INTERNAL MEDICINE

## 2022-03-17 PROCEDURE — 78452 HT MUSCLE IMAGE SPECT MULT: CPT | Mod: S$GLB,,, | Performed by: INTERNAL MEDICINE

## 2022-03-17 PROCEDURE — 93015 CV STRESS TEST SUPVJ I&R: CPT | Mod: S$GLB,,, | Performed by: INTERNAL MEDICINE

## 2022-03-17 PROCEDURE — 78452 STRESS TEST WITH MYOCARDIAL PERFUSION (CUPID ONLY): ICD-10-PCS | Mod: S$GLB,,, | Performed by: INTERNAL MEDICINE

## 2022-03-17 PROCEDURE — 93015 STRESS TEST WITH MYOCARDIAL PERFUSION (CUPID ONLY): ICD-10-PCS | Mod: S$GLB,,, | Performed by: INTERNAL MEDICINE

## 2022-03-17 PROCEDURE — A9502 STRESS TEST WITH MYOCARDIAL PERFUSION (CUPID ONLY): ICD-10-PCS | Mod: S$GLB,,, | Performed by: INTERNAL MEDICINE

## 2022-03-18 ENCOUNTER — TELEPHONE (OUTPATIENT)
Dept: CARDIOLOGY | Facility: CLINIC | Age: 67
End: 2022-03-18
Payer: MEDICARE

## 2022-03-18 NOTE — TELEPHONE ENCOUNTER
----- Message from Haley Grewal NP sent at 3/18/2022  8:57 AM CDT -----  Does he have a follow up ? If not needs one thanks

## 2022-03-21 ENCOUNTER — OFFICE VISIT (OUTPATIENT)
Dept: CARDIOLOGY | Facility: CLINIC | Age: 67
End: 2022-03-21
Payer: MEDICARE

## 2022-03-21 VITALS
HEIGHT: 71 IN | BODY MASS INDEX: 34.86 KG/M2 | HEART RATE: 88 BPM | SYSTOLIC BLOOD PRESSURE: 112 MMHG | WEIGHT: 249 LBS | DIASTOLIC BLOOD PRESSURE: 78 MMHG

## 2022-03-21 DIAGNOSIS — R94.31 NONSPECIFIC ABNORMAL ELECTROCARDIOGRAM (ECG) (EKG): ICD-10-CM

## 2022-03-21 DIAGNOSIS — I10 ESSENTIAL HYPERTENSION: ICD-10-CM

## 2022-03-21 DIAGNOSIS — R94.39 ABNORMAL CARDIOVASCULAR STRESS TEST: ICD-10-CM

## 2022-03-21 DIAGNOSIS — E08.65 DIABETES MELLITUS DUE TO UNDERLYING CONDITION WITH HYPERGLYCEMIA, WITHOUT LONG-TERM CURRENT USE OF INSULIN: ICD-10-CM

## 2022-03-21 DIAGNOSIS — E07.9 THYROID DYSFUNCTION: ICD-10-CM

## 2022-03-21 DIAGNOSIS — E11.65 TYPE 2 DIABETES MELLITUS WITH HYPERGLYCEMIA, WITHOUT LONG-TERM CURRENT USE OF INSULIN: ICD-10-CM

## 2022-03-21 DIAGNOSIS — E78.5 DYSLIPIDEMIA: ICD-10-CM

## 2022-03-21 DIAGNOSIS — I25.10 CORONARY ARTERY DISEASE INVOLVING NATIVE CORONARY ARTERY OF NATIVE HEART WITHOUT ANGINA PECTORIS: Primary | ICD-10-CM

## 2022-03-21 PROCEDURE — 99214 PR OFFICE/OUTPT VISIT, EST, LEVL IV, 30-39 MIN: ICD-10-PCS | Mod: S$GLB,,, | Performed by: NURSE PRACTITIONER

## 2022-03-21 PROCEDURE — 99214 OFFICE O/P EST MOD 30 MIN: CPT | Mod: S$GLB,,, | Performed by: NURSE PRACTITIONER

## 2022-03-21 RX ORDER — DIPHENHYDRAMINE HCL 25 MG
50 CAPSULE ORAL
Status: CANCELLED | OUTPATIENT
Start: 2022-03-21

## 2022-03-21 RX ORDER — SODIUM CHLORIDE 9 MG/ML
INJECTION, SOLUTION INTRAVENOUS ONCE
Status: CANCELLED | OUTPATIENT
Start: 2022-03-21 | End: 2022-03-21

## 2022-03-21 NOTE — ASSESSMENT & PLAN NOTE
Abnormal Stress Test.  Plan for angiographic assessment  Patient has history of PCI last in 2016  Discussed with patient the risks benefits and options and risks include but not limited to bleeding, vascular damage, renal failure, stroke, myocardial infarction, emergency bypass surgery and death.  All questions have been answered to patient's satisfaction.  Patient has voiced understanding of the procedure and agrees to move forward.  Informed Consent Obtained  See orders for further details.

## 2022-03-21 NOTE — H&P (VIEW-ONLY)
Subjective:    Patient ID:  Jigar Acharya is a 67 y.o. male patient here for evaluation Coronary Artery Disease and Hypertension      History of Present Illness:       Mr. Ramirez is here today for his follow up visit. He had a stress test that shows some Reversible ischemia in the RCA. He denies recent chest pain. He does get SOB with exertion.  He does also endorse having an increase in fatigue and tiredness. He has a history of PCI in the RCA with Dr. Cordero last in 2016. He also has a history of PCI to the LAD.            [facility]  coronary artery disease,9/23/14;3.0x24mm Taxus GABE toCirc, 3.0x12mm taxus GABE to mid RCA.  coronary artery stents in LAD and right coronary artery      Review of patient's allergies indicates:   Allergen Reactions    Demerol [meperidine] Other (See Comments)     Low Blood Pressure       Past Medical History:   Diagnosis Date    Coronary artery disease 2004    Diabetes mellitus, type 2     Diverticulitis     GERD (gastroesophageal reflux disease)     Hyperlipidemia     Hypertension     Hypothyroidism 2003    Kidney stones     Sleep apnea      Past Surgical History:   Procedure Laterality Date    COLONOSCOPY  12/18/2019    COLONOSCOPY N/A 12/18/2019    Procedure: COLONOSCOPY;  Surgeon: Esequiel Eastman MD;  Location: [facility];  Service: Endoscopy;  Laterality: N/A;    Coronary Angiography  01/01/2004    CORONARY STENT PLACEMENT      HERNIA REPAIR      Inguinal     Social History     Tobacco Use    Smoking status: Former Smoker    Smokeless tobacco: Never Used   Substance Use Topics    Alcohol use: No        Review of Systems:    As noted in HPI in addition      REVIEW OF SYSTEMS  CARDIOVASCULAR: sob and fatigue  RESPIRATORY: No recent fever, cough chills, SOB or congestion  : No blood in the urine  GI: No Nausea, vomiting, constipation, diarrhea, blood, or reflux.  MUSCULOSKELETAL: No myalgias  NEURO: No lightheadedness or dizziness  EYES: No Double vision,  blurry, vision or headache              Objective        Vitals:    03/21/22 1134   BP: 112/78   Pulse: 88       LIPIDS - LAST 2   Lab Results   Component Value Date    CHOL 118 06/25/2021    HDL 32 (L) 06/25/2021    LDLCALC 60 06/25/2021    TRIG 187 (H) 06/25/2021    CHOLHDL 3.7 06/25/2021       CBC - LAST 2  Lab Results   Component Value Date    WBC 10.85 03/03/2021    WBC 12.08 03/02/2021    RBC 3.98 (L) 03/03/2021    RBC 4.12 (L) 03/02/2021    HGB 12.5 (L) 03/03/2021    HGB 12.9 (L) 03/02/2021    HCT 36.8 (L) 03/03/2021    HCT 37.6 (L) 03/02/2021    MCV 93 03/03/2021    MCV 91 03/02/2021    MCH 31.4 (H) 03/03/2021    MCH 31.3 (H) 03/02/2021    MCHC 34.0 03/03/2021    MCHC 34.3 03/02/2021    RDW 12.4 03/03/2021    RDW 12.3 03/02/2021     (H) 03/03/2021     (H) 03/02/2021    MPV 9.9 03/03/2021    MPV 10.1 03/02/2021    GRAN 8.7 (H) 03/03/2021    GRAN 80.0 (H) 03/03/2021    LYMPH 1.4 03/03/2021    LYMPH 13.1 (L) 03/03/2021    MONO 0.4 03/03/2021    MONO 3.9 (L) 03/03/2021    BASO 0.02 03/03/2021    BASO 0.01 03/02/2021    NRBC 0 03/03/2021    NRBC 0 03/02/2021       CHEMISTRY & LIVER FUNCTION - LAST 2  Lab Results   Component Value Date     03/02/2021     02/28/2021    K 3.7 03/02/2021    K 3.9 02/28/2021     03/02/2021     02/28/2021    CO2 22 (L) 03/02/2021    CO2 23 02/28/2021    ANIONGAP 11 03/02/2021    ANIONGAP 10 02/28/2021    BUN 24 (H) 03/02/2021    BUN 19 02/28/2021    CREATININE 0.7 03/02/2021    CREATININE 0.6 02/28/2021     (H) 03/02/2021     (H) 02/28/2021    CALCIUM 8.5 (L) 03/02/2021    CALCIUM 8.5 (L) 02/28/2021    MG 2.1 03/02/2021    MG 2.1 02/28/2021    ALBUMIN 3.1 (L) 02/28/2021    ALBUMIN 3.2 (L) 02/27/2021    PROT 7.2 02/28/2021    PROT 7.1 02/27/2021    ALKPHOS 52 (L) 02/28/2021    ALKPHOS 51 (L) 02/27/2021    ALT 22 02/28/2021    ALT 20 02/27/2021    AST 29 02/28/2021    AST 33 02/27/2021    BILITOT 0.9 02/28/2021    BILITOT 0.8 02/27/2021         CARDIAC PROFILE - LAST 2  Lab Results   Component Value Date    BNP 29 02/23/2021     02/23/2021     03/02/2021     03/01/2021    TROPONINI <0.030 02/23/2021        COAGULATION - LAST 2  No results found for: LABPT, INR, APTT    ENDOCRINE & PSA - LAST 2  Lab Results   Component Value Date    PROCAL 0.13 02/23/2021    PSA 0.84 03/27/2018        ECHOCARDIOGRAM RESULTS  No results found for this or any previous visit.      CURRENT/PREVIOUS VISIT EKG  Results for orders placed or performed in visit on 02/02/22   IN OFFICE EKG 12-LEAD (to [employer])    Collection Time: 02/02/22  3:16 PM    Narrative    Test Reason : I25.10,    Vent. Rate : 070 BPM     Atrial Rate : 070 BPM     P-R Int : 182 ms          QRS Dur : 114 ms      QT Int : 404 ms       P-R-T Axes : 047 003 025 degrees     QTc Int : 436 ms    Normal sinus rhythm  Incomplete right bundle branch block  T wave abnormality, consider anterior ischemia  Abnormal ECG  When compared with ECG of 23-FEB-2021 18:59,  No significant change was found  Confirmed by Fransisco Bueno MD ([id number]) on 2/20/2022 7:49:31 PM    Referred By: [email or web address]   SELF           Confirmed By:Fransisco Bueno MD     No valid procedures specified.   Results for orders placed during the hospital encounter of 03/17/22    Nuclear Stress - Cardiology Interpreted    Interpretation Summary    Abnormal myocardial perfusion scan.    There is a moderate to severe intensity, moderate sized, mostly reversible perfusion abnormality with some fixed areas in the basal to mid inferolateral wall(s) in the typical distribution of the RCA territory.    The gated perfusion images showed an ejection fraction of 58% post stress. Normal ejection fraction is greater than 53%.    There is normal wall motion post stress.    LV cavity size is  and normal at stress.    The EKG portion of this study is abnormal but not diagnostic.    The patient reported no chest pain during the stress test.     Recommend further investigation if clinically indicated    No valid procedures specified.    PHYSICAL EXAM  CONSTITUTIONAL: Well built, well nourished in no apparent distress  NECK: no carotid bruit, no JVD  LUNGS: CTA  CHEST WALL: no tenderness  HEART: regular rate and rhythm, S1, S2 normal, no murmur, click, rub or gallop   ABDOMEN: soft, non-tender; bowel sounds normal; no masses,  no organomegaly  EXTREMITIES: Extremities normal, no edema, no calf tenderness noted  NEURO: AAO X 3    I HAVE REVIEWED :    The vital signs, nurses notes, and all the pertinent radiology and labs.        Current Outpatient Medications   Medication Instructions    albuterol (VENTOLIN HFA) 90 mcg/actuation inhaler 2 puffs, Inhalation, Every 6 hours PRN, Rescue    ascorbic acid (vitamin C) (VITAMIN C) 1,000 mg, Oral, Daily    aspirin 325 mg, Oral, Daily    EScitalopram oxalate (LEXAPRO) 10 mg, Oral, Daily    fluocinolone (DERMA-SMOOTHE) 0.01 % external oil Topical (Top), 2 times daily, Use on AA BID x 2 weeks    glimepiride (AMARYL) 2 mg, Oral, 2 times daily    hydrocortisone 2.5 % cream Topical (Top), 2 times daily, Use on AA BID x 10-14 days    ketoconazole (NIZORAL) 2 % cream Topical (Top), 2 times daily    levothyroxine (SYNTHROID) 150 mcg, Oral, Daily    losartan (COZAAR) 50 mg, Oral, Daily    metFORMIN (GLUCOPHAGE-XR) 1,000 mg, Oral, 2 times daily, Pt states taking 2 tabs x 2 daily.    metoprolol succinate (TOPROL-XL) 75 mg, Oral, Daily    nitroGLYCERIN (NITROSTAT) 0.4 mg, Sublingual, Every 5 min PRN    omega-3 fatty acids/fish oil (FISH OIL-OMEGA-3 FATTY ACIDS) 300-1,000 mg capsule 1 capsule, Oral, Daily    ondansetron (ZOFRAN) 4 mg, Oral, Every 6 hours PRN    ONETOUCH DELICA LANCETS 30 gauge Misc TEST BID    ONETOUCH VERIO Strp TEST BID    ONETOUCH VERIO SYSTEM Misc TEST D    potassium chloride (MICRO-K) 10 MEQ CpSR 10 mEq, Oral, Daily    simvastatin (ZOCOR) 20 mg, Oral, Nightly    TRULICITY 1.5 mg,  Subcutaneous, Weekly          Assessment & Plan     Abnormal cardiovascular stress test  Abnormal Stress Test.  Plan for angiographic assessment  Patient has history of PCI last in 2016  Discussed with patient the risks benefits and options and risks include but not limited to bleeding, vascular damage, renal failure, stroke, myocardial infarction, emergency bypass surgery and death.  All questions have been answered to patient's satisfaction.  Patient has voiced understanding of the procedure and agrees to move forward.  Informed Consent Obtained  See orders for further details.    Essential hypertension  BP Stable continue current regimen to include Losartan 50 mg daily  Metoprolol 75 mg daily      Dyslipidemia  Continue simvastatin 20 mg daily    Coronary artery disease involving native heart  History of PCI to the right coronary artery and LAD    Thyroid dysfunction  Continue levothyroxine 150 mcg daily follow-up with PCP    Diabetes mellitus due to underlying condition with hyperglycemia, without long-term current use of insulin  Currently on Amaryl and metformin will need to hold metformin 24 hours before in 48 hours after the procedure  He is also on Trulicity          No follow-ups on file.

## 2022-03-21 NOTE — ASSESSMENT & PLAN NOTE
Currently on Amaryl and metformin will need to hold metformin 24 hours before in 48 hours after the procedure  He is also on Trulicity

## 2022-03-21 NOTE — PROGRESS NOTES
Subjective:    Patient ID:  Jigar Acharya is a 67 y.o. male patient here for evaluation Coronary Artery Disease and Hypertension      History of Present Illness:       Mr. Ramirez is here today for his follow up visit. He had a stress test that shows some Reversible ischemia in the RCA. He denies recent chest pain. He does get SOB with exertion.  He does also endorse having an increase in fatigue and tiredness. He has a history of PCI in the RCA with Dr. Cordero last in 2016. He also has a history of PCI to the LAD.            [facility]  coronary artery disease,9/23/14;3.0x24mm Taxus GABE toCirc, 3.0x12mm taxus GABE to mid RCA.  coronary artery stents in LAD and right coronary artery      Review of patient's allergies indicates:   Allergen Reactions    Demerol [meperidine] Other (See Comments)     Low Blood Pressure       Past Medical History:   Diagnosis Date    Coronary artery disease 2004    Diabetes mellitus, type 2     Diverticulitis     GERD (gastroesophageal reflux disease)     Hyperlipidemia     Hypertension     Hypothyroidism 2003    Kidney stones     Sleep apnea      Past Surgical History:   Procedure Laterality Date    COLONOSCOPY  12/18/2019    COLONOSCOPY N/A 12/18/2019    Procedure: COLONOSCOPY;  Surgeon: Esequiel Eastman MD;  Location: [facility];  Service: Endoscopy;  Laterality: N/A;    Coronary Angiography  01/01/2004    CORONARY STENT PLACEMENT      HERNIA REPAIR      Inguinal     Social History     Tobacco Use    Smoking status: Former Smoker    Smokeless tobacco: Never Used   Substance Use Topics    Alcohol use: No        Review of Systems:    As noted in HPI in addition      REVIEW OF SYSTEMS  CARDIOVASCULAR: sob and fatigue  RESPIRATORY: No recent fever, cough chills, SOB or congestion  : No blood in the urine  GI: No Nausea, vomiting, constipation, diarrhea, blood, or reflux.  MUSCULOSKELETAL: No myalgias  NEURO: No lightheadedness or dizziness  EYES: No Double vision,  blurry, vision or headache              Objective        Vitals:    03/21/22 1134   BP: 112/78   Pulse: 88       LIPIDS - LAST 2   Lab Results   Component Value Date    CHOL 118 06/25/2021    HDL 32 (L) 06/25/2021    LDLCALC 60 06/25/2021    TRIG 187 (H) 06/25/2021    CHOLHDL 3.7 06/25/2021       CBC - LAST 2  Lab Results   Component Value Date    WBC 10.85 03/03/2021    WBC 12.08 03/02/2021    RBC 3.98 (L) 03/03/2021    RBC 4.12 (L) 03/02/2021    HGB 12.5 (L) 03/03/2021    HGB 12.9 (L) 03/02/2021    HCT 36.8 (L) 03/03/2021    HCT 37.6 (L) 03/02/2021    MCV 93 03/03/2021    MCV 91 03/02/2021    MCH 31.4 (H) 03/03/2021    MCH 31.3 (H) 03/02/2021    MCHC 34.0 03/03/2021    MCHC 34.3 03/02/2021    RDW 12.4 03/03/2021    RDW 12.3 03/02/2021     (H) 03/03/2021     (H) 03/02/2021    MPV 9.9 03/03/2021    MPV 10.1 03/02/2021    GRAN 8.7 (H) 03/03/2021    GRAN 80.0 (H) 03/03/2021    LYMPH 1.4 03/03/2021    LYMPH 13.1 (L) 03/03/2021    MONO 0.4 03/03/2021    MONO 3.9 (L) 03/03/2021    BASO 0.02 03/03/2021    BASO 0.01 03/02/2021    NRBC 0 03/03/2021    NRBC 0 03/02/2021       CHEMISTRY & LIVER FUNCTION - LAST 2  Lab Results   Component Value Date     03/02/2021     02/28/2021    K 3.7 03/02/2021    K 3.9 02/28/2021     03/02/2021     02/28/2021    CO2 22 (L) 03/02/2021    CO2 23 02/28/2021    ANIONGAP 11 03/02/2021    ANIONGAP 10 02/28/2021    BUN 24 (H) 03/02/2021    BUN 19 02/28/2021    CREATININE 0.7 03/02/2021    CREATININE 0.6 02/28/2021     (H) 03/02/2021     (H) 02/28/2021    CALCIUM 8.5 (L) 03/02/2021    CALCIUM 8.5 (L) 02/28/2021    MG 2.1 03/02/2021    MG 2.1 02/28/2021    ALBUMIN 3.1 (L) 02/28/2021    ALBUMIN 3.2 (L) 02/27/2021    PROT 7.2 02/28/2021    PROT 7.1 02/27/2021    ALKPHOS 52 (L) 02/28/2021    ALKPHOS 51 (L) 02/27/2021    ALT 22 02/28/2021    ALT 20 02/27/2021    AST 29 02/28/2021    AST 33 02/27/2021    BILITOT 0.9 02/28/2021    BILITOT 0.8 02/27/2021         CARDIAC PROFILE - LAST 2  Lab Results   Component Value Date    BNP 29 02/23/2021     02/23/2021     03/02/2021     03/01/2021    TROPONINI <0.030 02/23/2021        COAGULATION - LAST 2  No results found for: LABPT, INR, APTT    ENDOCRINE & PSA - LAST 2  Lab Results   Component Value Date    PROCAL 0.13 02/23/2021    PSA 0.84 03/27/2018        ECHOCARDIOGRAM RESULTS  No results found for this or any previous visit.      CURRENT/PREVIOUS VISIT EKG  Results for orders placed or performed in visit on 02/02/22   IN OFFICE EKG 12-LEAD (to [employer])    Collection Time: 02/02/22  3:16 PM    Narrative    Test Reason : I25.10,    Vent. Rate : 070 BPM     Atrial Rate : 070 BPM     P-R Int : 182 ms          QRS Dur : 114 ms      QT Int : 404 ms       P-R-T Axes : 047 003 025 degrees     QTc Int : 436 ms    Normal sinus rhythm  Incomplete right bundle branch block  T wave abnormality, consider anterior ischemia  Abnormal ECG  When compared with ECG of 23-FEB-2021 18:59,  No significant change was found  Confirmed by Fransisco Bueno MD ([id number]) on 2/20/2022 7:49:31 PM    Referred By: [email or web address]   SELF           Confirmed By:Fransisco Bueno MD     No valid procedures specified.   Results for orders placed during the hospital encounter of 03/17/22    Nuclear Stress - Cardiology Interpreted    Interpretation Summary    Abnormal myocardial perfusion scan.    There is a moderate to severe intensity, moderate sized, mostly reversible perfusion abnormality with some fixed areas in the basal to mid inferolateral wall(s) in the typical distribution of the RCA territory.    The gated perfusion images showed an ejection fraction of 58% post stress. Normal ejection fraction is greater than 53%.    There is normal wall motion post stress.    LV cavity size is  and normal at stress.    The EKG portion of this study is abnormal but not diagnostic.    The patient reported no chest pain during the stress test.     Recommend further investigation if clinically indicated    No valid procedures specified.    PHYSICAL EXAM  CONSTITUTIONAL: Well built, well nourished in no apparent distress  NECK: no carotid bruit, no JVD  LUNGS: CTA  CHEST WALL: no tenderness  HEART: regular rate and rhythm, S1, S2 normal, no murmur, click, rub or gallop   ABDOMEN: soft, non-tender; bowel sounds normal; no masses,  no organomegaly  EXTREMITIES: Extremities normal, no edema, no calf tenderness noted  NEURO: AAO X 3    I HAVE REVIEWED :    The vital signs, nurses notes, and all the pertinent radiology and labs.        Current Outpatient Medications   Medication Instructions    albuterol (VENTOLIN HFA) 90 mcg/actuation inhaler 2 puffs, Inhalation, Every 6 hours PRN, Rescue    ascorbic acid (vitamin C) (VITAMIN C) 1,000 mg, Oral, Daily    aspirin 325 mg, Oral, Daily    EScitalopram oxalate (LEXAPRO) 10 mg, Oral, Daily    fluocinolone (DERMA-SMOOTHE) 0.01 % external oil Topical (Top), 2 times daily, Use on AA BID x 2 weeks    glimepiride (AMARYL) 2 mg, Oral, 2 times daily    hydrocortisone 2.5 % cream Topical (Top), 2 times daily, Use on AA BID x 10-14 days    ketoconazole (NIZORAL) 2 % cream Topical (Top), 2 times daily    levothyroxine (SYNTHROID) 150 mcg, Oral, Daily    losartan (COZAAR) 50 mg, Oral, Daily    metFORMIN (GLUCOPHAGE-XR) 1,000 mg, Oral, 2 times daily, Pt states taking 2 tabs x 2 daily.    metoprolol succinate (TOPROL-XL) 75 mg, Oral, Daily    nitroGLYCERIN (NITROSTAT) 0.4 mg, Sublingual, Every 5 min PRN    omega-3 fatty acids/fish oil (FISH OIL-OMEGA-3 FATTY ACIDS) 300-1,000 mg capsule 1 capsule, Oral, Daily    ondansetron (ZOFRAN) 4 mg, Oral, Every 6 hours PRN    ONETOUCH DELICA LANCETS 30 gauge Misc TEST BID    ONETOUCH VERIO Strp TEST BID    ONETOUCH VERIO SYSTEM Misc TEST D    potassium chloride (MICRO-K) 10 MEQ CpSR 10 mEq, Oral, Daily    simvastatin (ZOCOR) 20 mg, Oral, Nightly    TRULICITY 1.5 mg,  Subcutaneous, Weekly          Assessment & Plan     Abnormal cardiovascular stress test  Abnormal Stress Test.  Plan for angiographic assessment  Patient has history of PCI last in 2016  Discussed with patient the risks benefits and options and risks include but not limited to bleeding, vascular damage, renal failure, stroke, myocardial infarction, emergency bypass surgery and death.  All questions have been answered to patient's satisfaction.  Patient has voiced understanding of the procedure and agrees to move forward.  Informed Consent Obtained  See orders for further details.    Essential hypertension  BP Stable continue current regimen to include Losartan 50 mg daily  Metoprolol 75 mg daily      Dyslipidemia  Continue simvastatin 20 mg daily    Coronary artery disease involving native heart  History of PCI to the right coronary artery and LAD    Thyroid dysfunction  Continue levothyroxine 150 mcg daily follow-up with PCP    Diabetes mellitus due to underlying condition with hyperglycemia, without long-term current use of insulin  Currently on Amaryl and metformin will need to hold metformin 24 hours before in 48 hours after the procedure  He is also on Trulicity          No follow-ups on file.

## 2022-03-25 ENCOUNTER — HOSPITAL ENCOUNTER (OUTPATIENT)
Dept: PREADMISSION TESTING | Facility: HOSPITAL | Age: 67
Discharge: HOME | End: 2022-03-25
Attending: INTERNAL MEDICINE
Payer: MEDICARE

## 2022-03-25 VITALS — WEIGHT: 249 LBS | BODY MASS INDEX: 34.86 KG/M2 | HEIGHT: 71 IN

## 2022-03-25 DIAGNOSIS — I10 ESSENTIAL HYPERTENSION: ICD-10-CM

## 2022-03-25 DIAGNOSIS — I25.10 CORONARY ARTERY DISEASE INVOLVING NATIVE CORONARY ARTERY OF NATIVE HEART WITHOUT ANGINA PECTORIS: ICD-10-CM

## 2022-03-25 DIAGNOSIS — R07.89 OTHER CHEST PAIN: ICD-10-CM

## 2022-03-25 DIAGNOSIS — R94.39 ABNORMAL CARDIOVASCULAR STRESS TEST: ICD-10-CM

## 2022-03-25 LAB
ANION GAP SERPL CALC-SCNC: 9 MMOL/L (ref 8–16)
BASOPHILS # BLD AUTO: 0.06 K/UL (ref 0–0.2)
BASOPHILS NFR BLD: 0.9 % (ref 0–1.9)
BUN SERPL-MCNC: 13 MG/DL (ref 8–23)
CALCIUM SERPL-MCNC: 9.3 MG/DL (ref 8.7–10.5)
CHLORIDE SERPL-SCNC: 104 MMOL/L (ref 95–110)
CO2 SERPL-SCNC: 22 MMOL/L (ref 23–29)
CREAT SERPL-MCNC: 1.1 MG/DL (ref 0.5–1.4)
DIFFERENTIAL METHOD: ABNORMAL
EOSINOPHIL # BLD AUTO: 0.2 K/UL (ref 0–0.5)
EOSINOPHIL NFR BLD: 3.3 % (ref 0–8)
ERYTHROCYTE [DISTWIDTH] IN BLOOD BY AUTOMATED COUNT: 12.1 % (ref 11.5–14.5)
EST. GFR  (AFRICAN AMERICAN): >60 ML/MIN/1.73 M^2
EST. GFR  (NON AFRICAN AMERICAN): >60 ML/MIN/1.73 M^2
GLUCOSE SERPL-MCNC: 231 MG/DL (ref 70–110)
HCT VFR BLD AUTO: 39.4 % (ref 40–54)
HGB BLD-MCNC: 13.6 G/DL (ref 14–18)
IMM GRANULOCYTES # BLD AUTO: 0.02 K/UL (ref 0–0.04)
IMM GRANULOCYTES NFR BLD AUTO: 0.3 % (ref 0–0.5)
LYMPHOCYTES # BLD AUTO: 2 K/UL (ref 1–4.8)
LYMPHOCYTES NFR BLD: 28.7 % (ref 18–48)
MCH RBC QN AUTO: 31.5 PG (ref 27–31)
MCHC RBC AUTO-ENTMCNC: 34.5 G/DL (ref 32–36)
MCV RBC AUTO: 91 FL (ref 82–98)
MONOCYTES # BLD AUTO: 0.6 K/UL (ref 0.3–1)
MONOCYTES NFR BLD: 8.1 % (ref 4–15)
NEUTROPHILS # BLD AUTO: 4.2 K/UL (ref 1.8–7.7)
NEUTROPHILS NFR BLD: 58.7 % (ref 38–73)
NRBC BLD-RTO: 0 /100 WBC
PLATELET # BLD AUTO: 234 K/UL (ref 150–450)
PMV BLD AUTO: 10.6 FL (ref 9.2–12.9)
POTASSIUM SERPL-SCNC: 3.9 MMOL/L (ref 3.5–5.1)
RBC # BLD AUTO: 4.32 M/UL (ref 4.6–6.2)
SODIUM SERPL-SCNC: 135 MMOL/L (ref 136–145)
WBC # BLD AUTO: 7.05 K/UL (ref 3.9–12.7)

## 2022-03-25 PROCEDURE — 93005 ELECTROCARDIOGRAM TRACING: CPT | Performed by: SPECIALIST

## 2022-03-25 PROCEDURE — 36415 COLL VENOUS BLD VENIPUNCTURE: CPT | Performed by: NURSE PRACTITIONER

## 2022-03-25 PROCEDURE — 93010 EKG 12-LEAD: ICD-10-PCS | Mod: ,,, | Performed by: SPECIALIST

## 2022-03-25 PROCEDURE — 93010 ELECTROCARDIOGRAM REPORT: CPT | Mod: ,,, | Performed by: SPECIALIST

## 2022-03-25 PROCEDURE — 80048 BASIC METABOLIC PNL TOTAL CA: CPT | Performed by: NURSE PRACTITIONER

## 2022-03-25 PROCEDURE — 85025 COMPLETE CBC W/AUTO DIFF WBC: CPT | Performed by: NURSE PRACTITIONER

## 2022-03-25 RX ORDER — POTASSIUM CHLORIDE 20 MEQ/1
20 TABLET, EXTENDED RELEASE ORAL ONCE
Status: CANCELLED | OUTPATIENT
Start: 2022-03-25 | End: 2022-03-25

## 2022-03-29 ENCOUNTER — HOSPITAL ENCOUNTER (OUTPATIENT)
Facility: HOSPITAL | Age: 67
Discharge: HOME | End: 2022-03-29
Attending: INTERNAL MEDICINE | Admitting: INTERNAL MEDICINE
Payer: MEDICARE

## 2022-03-29 VITALS
DIASTOLIC BLOOD PRESSURE: 56 MMHG | SYSTOLIC BLOOD PRESSURE: 129 MMHG | RESPIRATION RATE: 5 BRPM | HEART RATE: 63 BPM | OXYGEN SATURATION: 97 %

## 2022-03-29 DIAGNOSIS — I25.10 CAD (CORONARY ARTERY DISEASE): ICD-10-CM

## 2022-03-29 DIAGNOSIS — I25.10 CORONARY ARTERY DISEASE INVOLVING NATIVE CORONARY ARTERY OF NATIVE HEART WITHOUT ANGINA PECTORIS: ICD-10-CM

## 2022-03-29 DIAGNOSIS — R94.31 NONSPECIFIC ABNORMAL ELECTROCARDIOGRAM (ECG) (EKG): Primary | ICD-10-CM

## 2022-03-29 DIAGNOSIS — R94.39 ABNORMAL CARDIOVASCULAR STRESS TEST: ICD-10-CM

## 2022-03-29 DIAGNOSIS — I10 ESSENTIAL HYPERTENSION: ICD-10-CM

## 2022-03-29 LAB
CATH EF QUANTITATIVE: 65 %
POC ACTIVATED CLOTTING TIME K: 207 SEC (ref 74–137)
SAMPLE: ABNORMAL
SARS-COV-2 RDRP RESP QL NAA+PROBE: NEGATIVE

## 2022-03-29 PROCEDURE — 92928 PRQ TCAT PLMT NTRAC ST 1 LES: CPT | Mod: LC,,, | Performed by: INTERNAL MEDICINE

## 2022-03-29 PROCEDURE — 93005 ELECTROCARDIOGRAM TRACING: CPT | Performed by: INTERNAL MEDICINE

## 2022-03-29 PROCEDURE — C1769 GUIDE WIRE: HCPCS | Performed by: INTERNAL MEDICINE

## 2022-03-29 PROCEDURE — C1874 STENT, COATED/COV W/DEL SYS: HCPCS | Performed by: INTERNAL MEDICINE

## 2022-03-29 PROCEDURE — 93010 ELECTROCARDIOGRAM REPORT: CPT | Mod: 59,,, | Performed by: INTERNAL MEDICINE

## 2022-03-29 PROCEDURE — 25000003 PHARM REV CODE 250: Performed by: NURSE PRACTITIONER

## 2022-03-29 PROCEDURE — C9600 PERC DRUG-EL COR STENT SING: HCPCS | Mod: LC | Performed by: INTERNAL MEDICINE

## 2022-03-29 PROCEDURE — 63600175 PHARM REV CODE 636 W HCPCS: Performed by: INTERNAL MEDICINE

## 2022-03-29 PROCEDURE — 99152 MOD SED SAME PHYS/QHP 5/>YRS: CPT | Mod: ,,, | Performed by: INTERNAL MEDICINE

## 2022-03-29 PROCEDURE — C1887 CATHETER, GUIDING: HCPCS | Performed by: INTERNAL MEDICINE

## 2022-03-29 PROCEDURE — 99152 PR MOD CONSCIOUS SEDATION, SAME PHYS, 5+ YRS, FIRST 15 MIN: ICD-10-PCS | Mod: ,,, | Performed by: INTERNAL MEDICINE

## 2022-03-29 PROCEDURE — 92928 PR STENT: ICD-10-PCS | Mod: LC,,, | Performed by: INTERNAL MEDICINE

## 2022-03-29 PROCEDURE — 93458 L HRT ARTERY/VENTRICLE ANGIO: CPT | Mod: XU | Performed by: INTERNAL MEDICINE

## 2022-03-29 PROCEDURE — 25500020 PHARM REV CODE 255: Performed by: INTERNAL MEDICINE

## 2022-03-29 PROCEDURE — C1894 INTRO/SHEATH, NON-LASER: HCPCS | Performed by: INTERNAL MEDICINE

## 2022-03-29 PROCEDURE — 25000003 PHARM REV CODE 250: Performed by: INTERNAL MEDICINE

## 2022-03-29 PROCEDURE — 99153 MOD SED SAME PHYS/QHP EA: CPT | Performed by: INTERNAL MEDICINE

## 2022-03-29 PROCEDURE — 93010 EKG 12-LEAD: ICD-10-PCS | Mod: 59,,, | Performed by: INTERNAL MEDICINE

## 2022-03-29 PROCEDURE — 93458 PR CATH PLACE/CORON ANGIO, IMG SUPER/INTERP,W LEFT HEART VENTRICULOGRAPHY: ICD-10-PCS | Mod: 26,59,51, | Performed by: INTERNAL MEDICINE

## 2022-03-29 PROCEDURE — 99152 MOD SED SAME PHYS/QHP 5/>YRS: CPT | Performed by: INTERNAL MEDICINE

## 2022-03-29 PROCEDURE — 93458 L HRT ARTERY/VENTRICLE ANGIO: CPT | Mod: 26,59,51, | Performed by: INTERNAL MEDICINE

## 2022-03-29 PROCEDURE — U0002 COVID-19 LAB TEST NON-CDC: HCPCS | Performed by: INTERNAL MEDICINE

## 2022-03-29 DEVICE — STENT RONYX25012UX RESOLUTE ONYX 2.50X12
Type: IMPLANTABLE DEVICE | Site: HEART | Status: FUNCTIONAL
Brand: RESOLUTE ONYX™

## 2022-03-29 RX ORDER — POTASSIUM CHLORIDE 20 MEQ/1
20 TABLET, EXTENDED RELEASE ORAL ONCE
Status: COMPLETED | OUTPATIENT
Start: 2022-03-29 | End: 2022-03-29

## 2022-03-29 RX ORDER — METFORMIN HYDROCHLORIDE 500 MG/1
1000 TABLET, EXTENDED RELEASE ORAL 2 TIMES DAILY
Qty: 180 TABLET | Refills: 3 | Status: SHIPPED | OUTPATIENT
Start: 2022-03-31 | End: 2022-10-17

## 2022-03-29 RX ORDER — CLOPIDOGREL BISULFATE 75 MG/1
75 TABLET ORAL DAILY
Status: DISCONTINUED | OUTPATIENT
Start: 2022-03-30 | End: 2022-03-29 | Stop reason: HOSPADM

## 2022-03-29 RX ORDER — DIPHENHYDRAMINE HCL 25 MG
50 CAPSULE ORAL
Status: DISCONTINUED | OUTPATIENT
Start: 2022-03-29 | End: 2022-03-29 | Stop reason: HOSPADM

## 2022-03-29 RX ORDER — FENTANYL CITRATE 50 UG/ML
INJECTION, SOLUTION INTRAMUSCULAR; INTRAVENOUS
Status: DISCONTINUED | OUTPATIENT
Start: 2022-03-29 | End: 2022-03-29 | Stop reason: HOSPADM

## 2022-03-29 RX ORDER — LIDOCAINE HYDROCHLORIDE 10 MG/ML
INJECTION, SOLUTION EPIDURAL; INFILTRATION; INTRACAUDAL; PERINEURAL
Status: DISCONTINUED | OUTPATIENT
Start: 2022-03-29 | End: 2022-03-29 | Stop reason: HOSPADM

## 2022-03-29 RX ORDER — MIDAZOLAM HYDROCHLORIDE 1 MG/ML
INJECTION INTRAMUSCULAR; INTRAVENOUS
Status: DISCONTINUED | OUTPATIENT
Start: 2022-03-29 | End: 2022-03-29 | Stop reason: HOSPADM

## 2022-03-29 RX ORDER — NITROGLYCERIN 0.4 MG/1
0.4 TABLET SUBLINGUAL EVERY 5 MIN PRN
Status: DISCONTINUED | OUTPATIENT
Start: 2022-03-29 | End: 2022-03-29 | Stop reason: HOSPADM

## 2022-03-29 RX ORDER — ASPIRIN 325 MG
325 TABLET ORAL ONCE
Status: COMPLETED | OUTPATIENT
Start: 2022-03-29 | End: 2022-03-29

## 2022-03-29 RX ORDER — HEPARIN SODIUM 10000 [USP'U]/ML
INJECTION, SOLUTION INTRAVENOUS; SUBCUTANEOUS
Status: DISCONTINUED | OUTPATIENT
Start: 2022-03-29 | End: 2022-03-29 | Stop reason: HOSPADM

## 2022-03-29 RX ORDER — ONDANSETRON 2 MG/ML
4 INJECTION INTRAMUSCULAR; INTRAVENOUS EVERY 12 HOURS PRN
Status: DISCONTINUED | OUTPATIENT
Start: 2022-03-29 | End: 2022-03-29 | Stop reason: HOSPADM

## 2022-03-29 RX ORDER — CLOPIDOGREL BISULFATE 75 MG/1
TABLET ORAL
Status: DISCONTINUED | OUTPATIENT
Start: 2022-03-29 | End: 2022-03-29 | Stop reason: HOSPADM

## 2022-03-29 RX ORDER — SODIUM CHLORIDE 9 MG/ML
INJECTION, SOLUTION INTRAVENOUS ONCE
Status: COMPLETED | OUTPATIENT
Start: 2022-03-29 | End: 2022-03-29

## 2022-03-29 RX ORDER — SODIUM CHLORIDE 450 MG/100ML
INJECTION, SOLUTION INTRAVENOUS CONTINUOUS
Status: DISCONTINUED | OUTPATIENT
Start: 2022-03-29 | End: 2022-03-29 | Stop reason: HOSPADM

## 2022-03-29 RX ORDER — ACETAMINOPHEN 325 MG/1
650 TABLET ORAL EVERY 4 HOURS PRN
Status: DISCONTINUED | OUTPATIENT
Start: 2022-03-29 | End: 2022-03-29 | Stop reason: HOSPADM

## 2022-03-29 RX ORDER — CLOPIDOGREL BISULFATE 75 MG/1
75 TABLET ORAL DAILY
Qty: 90 TABLET | Refills: 3 | Status: SHIPPED | OUTPATIENT
Start: 2022-03-29 | End: 2023-02-20

## 2022-03-29 RX ORDER — CLOPIDOGREL BISULFATE 75 MG/1
75 TABLET ORAL DAILY
Status: ON HOLD | COMMUNITY
End: 2022-03-29 | Stop reason: SDUPTHER

## 2022-03-29 RX ADMIN — SODIUM CHLORIDE: 0.45 INJECTION, SOLUTION INTRAVENOUS at 10:03

## 2022-03-29 RX ADMIN — POTASSIUM CHLORIDE 20 MEQ: 20 TABLET, EXTENDED RELEASE ORAL at 07:03

## 2022-03-29 RX ADMIN — SODIUM CHLORIDE: 0.9 INJECTION, SOLUTION INTRAVENOUS at 07:03

## 2022-03-29 RX ADMIN — DIPHENHYDRAMINE HYDROCHLORIDE 50 MG: 25 CAPSULE ORAL at 07:03

## 2022-03-29 RX ADMIN — ASPIRIN 325 MG ORAL TABLET 325 MG: 325 PILL ORAL at 10:03

## 2022-03-29 NOTE — DISCHARGE INSTRUCTIONS
Post Radial Cath Discharge Instructions  Keep puncture site covered with current bandage for 24 hours.  You may shower in 24 hours. Do not take a tub bath or submerge the puncture site in water for 72 hours.   You may cover the site with a Band-Aid. Keep Band-Aid clean and dry at all times.    No lifting over 5 pounds with the arm you puncture site is on for 72 hours.  No strenuous activity such as bowling, tennis, etc for 72 hours  Do not operate lawnmower, motorcycle, chainsaw, or all terrain vehicle for 72 hours.  No blood pressure or tourniquets on affected arm for 72 hours.   The puncture site may be slightly bruised and sore following your procedure.   Drink 6-8 glasses of clear liquids during the next 24 hours to flush the dye out.     If Bleeding Occurs, DO NOT PANIC  Place 1 or 2 fingers over the puncture site and hold firm pressure to stop bleeding. You may be able to feel your pulse as you hold pressure  Lift you fingers after 5 minutes to see if the bleeding stopped.  Once has stopped, gently wipe the wrist area clean and cover with a bandage.  If the bleeding does not stop after 30 minutes, or if there is a large amount of bleeding or spurting, call 911! Do not drive yourself to the hospital.     Should any of the following occur, Contact your Physician Immediately:  Redness, inflamation, swelling, chills or fever, or discolred drainage at procedure site   Coldness, discoloration, ongoing numbness, severe pain, or swelling. Expect mild tingling of hand and tenderness at the puncture site for up to 3 days. If this persists or other symptoms develop, notify your physician

## 2022-03-29 NOTE — NURSING
0926: Pt back from cath lab. TR band placed to right wrist. Site CDI/ no hematoma. Will continue to monitor.   1038: Pt in bed eating comfortably. Wife at bedside.   1351: All air deflated out of TR Band. Gauze/ tegaderm placed over puncture site. Pt ambulating around unit.   1400: Pt ambulated well around unit. No issued noted. AVS reviewed with pt & wife.   1417: Pt wheeled to car. VSS/NADN at this time.

## 2022-03-29 NOTE — Clinical Note
The site was marked. The right groin and right radial was prepped. The site was prepped with ChloraPrep. The site was clipped. The patient was draped. The patient was positioned supine. The patient was secured using safety straps.

## 2022-03-29 NOTE — Clinical Note
Radial flush administered by Dr. Bueno.   Radial Flush:1.25 mg of Verapamil, 1.25 mg of Nitroglycerin, & 750 units of Heparin mixed in 250 ml of NS

## 2022-03-29 NOTE — Clinical Note
The left ventricle was injected. The injected rate was 4 mL/sec. The total injected volume was 8 mL.

## 2022-03-29 NOTE — Clinical Note
The catheter was repositioned into the ostium   right coronary artery. An angiography was performed of the right coronary arteries. The catheter was unable to engage the area..

## 2022-03-29 NOTE — Clinical Note
100 ml of contrast were injected throughout the case. 200 mL of contrast was the total wasted during the case. 300 mL was the total amount used during the case.

## 2022-03-29 NOTE — Clinical Note
Dr. Johnson called for surgical back up and agreeable for this. OR also called and states has room available if needed

## 2022-04-04 ENCOUNTER — TELEPHONE (OUTPATIENT)
Dept: CARDIOLOGY | Facility: CLINIC | Age: 67
End: 2022-04-04
Payer: MEDICARE

## 2022-04-04 NOTE — TELEPHONE ENCOUNTER
----- Message from Jacob Sloan sent at 4/4/2022  8:11 AM CDT -----  Contact: Haley  Type: Needs Medical Advice  Who Called: Patient wife Haley  Symptoms (please be specific):   How long has patient had these symptoms:    Pharmacy name and phone #:    Best Call Back Number: [phone]  Additional Information: Pt wife requesting a call back to schedule a Hospital Follow Up appt.Pt had procedure on 03/29/22.

## 2022-04-08 ENCOUNTER — OFFICE VISIT (OUTPATIENT)
Dept: CARDIOLOGY | Facility: CLINIC | Age: 67
End: 2022-04-08
Payer: MEDICARE

## 2022-04-08 VITALS
SYSTOLIC BLOOD PRESSURE: 112 MMHG | WEIGHT: 251 LBS | HEIGHT: 71 IN | BODY MASS INDEX: 35.14 KG/M2 | DIASTOLIC BLOOD PRESSURE: 78 MMHG | HEART RATE: 74 BPM

## 2022-04-08 DIAGNOSIS — I10 ESSENTIAL HYPERTENSION: ICD-10-CM

## 2022-04-08 DIAGNOSIS — R94.31 NONSPECIFIC ABNORMAL ELECTROCARDIOGRAM (ECG) (EKG): ICD-10-CM

## 2022-04-08 DIAGNOSIS — E78.5 DYSLIPIDEMIA: Primary | ICD-10-CM

## 2022-04-08 DIAGNOSIS — I25.10 CORONARY ARTERY DISEASE INVOLVING NATIVE CORONARY ARTERY OF NATIVE HEART WITHOUT ANGINA PECTORIS: ICD-10-CM

## 2022-04-08 DIAGNOSIS — E11.65 TYPE 2 DIABETES MELLITUS WITH HYPERGLYCEMIA, WITHOUT LONG-TERM CURRENT USE OF INSULIN: ICD-10-CM

## 2022-04-08 DIAGNOSIS — E07.9 THYROID DYSFUNCTION: ICD-10-CM

## 2022-04-08 PROCEDURE — 93005 ELECTROCARDIOGRAM TRACING: CPT | Mod: S$GLB,,, | Performed by: NURSE PRACTITIONER

## 2022-04-08 PROCEDURE — 99214 PR OFFICE/OUTPT VISIT, EST, LEVL IV, 30-39 MIN: ICD-10-PCS | Mod: S$GLB,,, | Performed by: NURSE PRACTITIONER

## 2022-04-08 PROCEDURE — 99214 OFFICE O/P EST MOD 30 MIN: CPT | Mod: S$GLB,,, | Performed by: NURSE PRACTITIONER

## 2022-04-08 PROCEDURE — 93005 EKG 12-LEAD: ICD-10-PCS | Mod: S$GLB,,, | Performed by: NURSE PRACTITIONER

## 2022-04-08 NOTE — PROGRESS NOTES
Subjective:    Patient ID:  Jigar Acharya is a 67 y.o. male patient here for evaluation Coronary Artery Disease and Hypertension      History of Present Illness:     Patient here after his recent OP CATH. He denies CP or SOB. Site is soft. Denies any issues states he feels great.   Mr. Ramirez is here today for his follow up visit. He denies chest pain or SOB. No recent issues.           · The 1st Mrg lesion was 95% stenosed with 0% stenosis post-intervention.  · The pre-procedure left ventricular end diastolic pressure was 14.  · The post-procedure left ventricular end diastolic pressure was 15.  · The left ventricular systolic function was normal.  · The left ventricular end diastolic pressure was normal.  · The ejection fraction was calculated to be 65%.  · The left ventricular ejection fraction was grossly normal.  · A STENT ANDRES GABE 2.5 X 12 stent was successfully placed at 11 RUEL for 30 sec.  · The estimated blood loss was <50 mL.  · There was three vessel coronary artery disease.  · The PCI was successful.     The procedure log was documented by No documenter listed and verified by Fransisco Bueno MD.     Date: 3/29/2022  Time: 12:15 PM      Review of patient's allergies indicates:   Allergen Reactions    Demerol [meperidine] Other (See Comments)     Low Blood Pressure       Past Medical History:   Diagnosis Date    Coronary artery disease 2004    Diabetes mellitus, type 2     Diverticulitis     GERD (gastroesophageal reflux disease)     Hyperlipidemia     Hypertension     Hypothyroidism 2003    Kidney stones     Pancreatitis     Sleep apnea      Past Surgical History:   Procedure Laterality Date    COLONOSCOPY  12/18/2019    COLONOSCOPY N/A 12/18/2019    Procedure: COLONOSCOPY;  Surgeon: Esequiel Eastman MD;  Location: [facility];  Service: Endoscopy;  Laterality: N/A;    Coronary Angiography  01/01/2004    CORONARY STENT PLACEMENT      HERNIA REPAIR      Inguinal    LEFT HEART  CATHETERIZATION Left 3/29/2022    Procedure: Left heart cath;  Surgeon: Fransisco Bueno MD;  Location: [facility] C[facility];  Service: Cardiology;  Laterality: Left;     Social History     Tobacco Use    Smoking status: Former Smoker    Smokeless tobacco: Never Used   Substance Use Topics    Alcohol use: No    Drug use: Never        Review of Systems:    As noted in HPI in addition      REVIEW OF SYSTEMS  CARDIOVASCULAR: No recent chest pain, palpitations, arm, neck, or jaw pain  RESPIRATORY: No recent fever, cough chills, SOB or congestion  : No blood in the urine  GI: No Nausea, vomiting, constipation, diarrhea, blood, or reflux.  MUSCULOSKELETAL: No myalgias  NEURO: No lightheadedness or dizziness  EYES: No Double vision, blurry, vision or headache              Objective        Vitals:    04/08/22 0932   BP: 112/78   Pulse: 74       LIPIDS - LAST 2   Lab Results   Component Value Date    CHOL 118 06/25/2021    HDL 32 (L) 06/25/2021    LDLCALC 60 06/25/2021    TRIG 187 (H) 06/25/2021    CHOLHDL 3.7 06/25/2021       CBC - LAST 2  Lab Results   Component Value Date    WBC 7.05 03/25/2022    WBC 10.85 03/03/2021    RBC 4.32 (L) 03/25/2022    RBC 3.98 (L) 03/03/2021    HGB 13.6 (L) 03/25/2022    HGB 12.5 (L) 03/03/2021    HCT 39.4 (L) 03/25/2022    HCT 36.8 (L) 03/03/2021    MCV 91 03/25/2022    MCV 93 03/03/2021    MCH 31.5 (H) 03/25/2022    MCH 31.4 (H) 03/03/2021    MCHC 34.5 03/25/2022    MCHC 34.0 03/03/2021    RDW 12.1 03/25/2022    RDW 12.4 03/03/2021     03/25/2022     (H) 03/03/2021    MPV 10.6 03/25/2022    MPV 9.9 03/03/2021    GRAN 4.2 03/25/2022    GRAN 58.7 03/25/2022    LYMPH 2.0 03/25/2022    LYMPH 28.7 03/25/2022    MONO 0.6 03/25/2022    MONO 8.1 03/25/2022    BASO 0.06 03/25/2022    BASO 0.02 03/03/2021    NRBC 0 03/25/2022    NRBC 0 03/03/2021       CHEMISTRY & LIVER FUNCTION - LAST 2  Lab Results   Component Value Date     (L) 03/25/2022     03/02/2021    K 3.9  03/25/2022    K 3.7 03/02/2021     03/25/2022     03/02/2021    CO2 22 (L) 03/25/2022    CO2 22 (L) 03/02/2021    ANIONGAP 9 03/25/2022    ANIONGAP 11 03/02/2021    BUN 13 03/25/2022    BUN 24 (H) 03/02/2021    CREATININE 1.1 03/25/2022    CREATININE 0.7 03/02/2021     (H) 03/25/2022     (H) 03/02/2021    CALCIUM 9.3 03/25/2022    CALCIUM 8.5 (L) 03/02/2021    MG 2.1 03/02/2021    MG 2.1 02/28/2021    ALBUMIN 3.1 (L) 02/28/2021    ALBUMIN 3.2 (L) 02/27/2021    PROT 7.2 02/28/2021    PROT 7.1 02/27/2021    ALKPHOS 52 (L) 02/28/2021    ALKPHOS 51 (L) 02/27/2021    ALT 22 02/28/2021    ALT 20 02/27/2021    AST 29 02/28/2021    AST 33 02/27/2021    BILITOT 0.9 02/28/2021    BILITOT 0.8 02/27/2021        CARDIAC PROFILE - LAST 2  Lab Results   Component Value Date    BNP 29 02/23/2021     02/23/2021     03/02/2021     03/01/2021    TROPONINI <0.030 02/23/2021        COAGULATION - LAST 2  No results found for: LABPT, INR, APTT    ENDOCRINE & PSA - LAST 2  Lab Results   Component Value Date    PROCAL 0.13 02/23/2021    PSA 0.84 03/27/2018        ECHOCARDIOGRAM RESULTS  Results for orders placed during the hospital encounter of 03/17/22    Echo    Interpretation Summary  · The left ventricle is normal in size with mild concentric hypertrophy and normal systolic function.  · The estimated ejection fraction is 61%.  · Normal left ventricular diastolic function.  · Normal right ventricular size with normal right ventricular systolic function.  · Mild left atrial enlargement.  · Normal central venous pressure (3 mmHg).      CURRENT/PREVIOUS VISIT EKG  Results for orders placed or performed during the hospital encounter of 03/29/22   EKG 12-LEAD on arrival to floor    Collection Time: 03/29/22  9:07 AM    Narrative    Test Reason : I25.10,R94.39,I25.10,R94.31,    Vent. Rate : 072 BPM     Atrial Rate : 072 BPM     P-R Int : 178 ms          QRS Dur : 108 ms      QT Int : 436 ms        P-R-T Axes : 072 028 066 degrees     QTc Int : 477 ms    Normal sinus rhythm  Incomplete right bundle branch block  Nonspecific T wave abnormality  Prolonged QT  Abnormal ECG  When compared with ECG of 25-MAR-2022 14:16,  Premature ventricular complexes are no longer Present  Confirmed by Fransisco Bueno MD ([id number]) on 4/6/2022 1:07:17 PM    Referred By:             Confirmed By:Fransisco Bueno MD     No valid procedures specified.   Results for orders placed during the hospital encounter of 03/17/22    Nuclear Stress - Cardiology Interpreted    Interpretation Summary    Abnormal myocardial perfusion scan.    There is a moderate to severe intensity, moderate sized, mostly reversible perfusion abnormality with some fixed areas in the basal to mid inferolateral wall(s) in the typical distribution of the RCA territory.    The gated perfusion images showed an ejection fraction of 58% post stress. Normal ejection fraction is greater than 53%.    There is normal wall motion post stress.    LV cavity size is  and normal at stress.    The EKG portion of this study is abnormal but not diagnostic.    The patient reported no chest pain during the stress test.    Recommend further investigation if clinically indicated      PHYSICAL EXAM  CONSTITUTIONAL: Well built, well nourished in no apparent distress  NECK: no carotid bruit, no JVD  LUNGS: CTA  CHEST WALL: no tenderness  HEART: regular rate and rhythm, S1, S2 normal, no murmur, click, rub or gallop   ABDOMEN: soft, non-tender; bowel sounds normal; no masses,  no organomegaly  EXTREMITIES: Extremities normal, no edema, no calf tenderness noted  NEURO: AAO X 3    I HAVE REVIEWED :    The vital signs, nurses notes, and all the pertinent radiology and labs.        Current Outpatient Medications   Medication Instructions    albuterol (VENTOLIN HFA) 90 mcg/actuation inhaler 2 puffs, Inhalation, Every 6 hours PRN, Rescue    ascorbic acid (vitamin C) (VITAMIN C) 1,000 mg,  Oral, Daily    aspirin 325 mg, Oral, Daily    clopidogreL (PLAVIX) 75 mg, Oral, Daily    EScitalopram oxalate (LEXAPRO) 10 mg, Oral, Daily    fluocinolone (DERMA-SMOOTHE) 0.01 % external oil Topical (Top), 2 times daily, Use on AA BID x 2 weeks    glimepiride (AMARYL) 2 mg, Oral, 2 times daily    hydrocortisone 2.5 % cream Topical (Top), 2 times daily, Use on AA BID x 10-14 days    ketoconazole (NIZORAL) 2 % cream Topical (Top), 2 times daily    levothyroxine (SYNTHROID) 150 mcg, Oral, Daily    losartan (COZAAR) 50 mg, Oral, Daily    metFORMIN (GLUCOPHAGE-XR) 1,000 mg, Oral, 2 times daily, Pt states taking 2 tabs x 2 daily.    metoprolol succinate (TOPROL-XL) 75 mg, Oral, Daily    nitroGLYCERIN (NITROSTAT) 0.4 mg, Sublingual, Every 5 min PRN    omega-3 fatty acids/fish oil (FISH OIL-OMEGA-3 FATTY ACIDS) 300-1,000 mg capsule 1 capsule, Oral, Daily    ondansetron (ZOFRAN) 4 mg, Oral, Every 6 hours PRN    ONETOUCH DELICA LANCETS 30 gauge Misc TEST BID    ONETOUCH VERIO Strp TEST BID    ONETOUCH VERIO SYSTEM Misc TEST D    potassium chloride (MICRO-K) 10 MEQ CpSR 10 mEq, Oral, Daily    simvastatin (ZOCOR) 20 mg, Oral, Nightly    TRULICITY 1.5 mg, Subcutaneous, Weekly          Assessment & Plan     Coronary artery disease involving native coronary artery of native heart without angina pectoris  History of PCI to the right coronary artery and LAD  And now PCI D2  Continue Plavix 75 MG DAILY and  for another month than decrease ASA 81 mg daily  Continue Zocore  Recheck Lipid panel and Liver Profile  Check CBC    Essential hypertension  BP Stable continue current regimen to include Losartan 50 mg daily  Metoprolol 75 mg daily      Dyslipidemia  BP Stable continue current regimen to include Losartan 50 mg daily  Metoprolol 75 mg daily      Type 2 diabetes mellitus with hyperglycemia, without long-term current use of insulin  Per pcp    Thyroid dysfunction  Per PCP          No follow-ups on file.

## 2022-04-08 NOTE — ASSESSMENT & PLAN NOTE
History of PCI to the right coronary artery and LAD  And now PCI OM  Continue Plavix 75 MG DAILY and  for another month than decrease ASA 81 mg daily  Continue Zocore  Recheck Lipid panel and Liver Profile  Check CBC

## 2022-07-07 RX ORDER — METOPROLOL SUCCINATE 50 MG/1
TABLET, EXTENDED RELEASE ORAL
Qty: 135 TABLET | Refills: 3 | Status: SHIPPED | OUTPATIENT
Start: 2022-07-07 | End: 2023-10-18

## 2022-07-07 NOTE — TELEPHONE ENCOUNTER
----- Message from Nile Aalmo sent at 7/7/2022  2:36 PM CDT -----  Regarding: refill  Contact: patient  Type:  RX Refill Request    Who Called:  patient   Refill or New Rx:  Refill  RX Name and Strength:  metoprolol  How is the patient currently taking it? (ex. 1XDay):  one time a day   Is this a 30 day or 90 day RX:  30day  Preferred Pharmacy with phone number:      [id number] [address] & [address] [address]  Phone: [phone] Fax: [phone]     Local or Mail Order:  Local  Ordering Provider:  Myles Jauregui Call Back Number:  [phone] (home)

## 2022-07-29 ENCOUNTER — TELEPHONE (OUTPATIENT)
Dept: CARDIOLOGY | Facility: CLINIC | Age: 67
End: 2022-07-29
Payer: MEDICARE

## 2022-07-29 NOTE — TELEPHONE ENCOUNTER
Spoke with patient and informed pt that Dr. bueno is not in office due to call schedule and appt would need to be moved. Pt was offered AUBRIE or 1 month recall for Dr. Bueno. Pt said he is fine with AUBRIE on 9/1. Appt scheduled.

## 2022-10-03 RX ORDER — SIMVASTATIN 20 MG/1
20 TABLET, FILM COATED ORAL NIGHTLY
Qty: 90 TABLET | Refills: 3 | Status: SHIPPED | OUTPATIENT
Start: 2022-10-03 | End: 2023-09-26 | Stop reason: SDUPTHER

## 2022-10-21 ENCOUNTER — OFFICE VISIT (OUTPATIENT)
Dept: CARDIOLOGY | Facility: CLINIC | Age: 67
End: 2022-10-21
Payer: MEDICARE

## 2022-10-21 VITALS
HEART RATE: 82 BPM | BODY MASS INDEX: 34.72 KG/M2 | DIASTOLIC BLOOD PRESSURE: 60 MMHG | WEIGHT: 248 LBS | RESPIRATION RATE: 16 BRPM | OXYGEN SATURATION: 96 % | SYSTOLIC BLOOD PRESSURE: 122 MMHG | HEIGHT: 71 IN

## 2022-10-21 DIAGNOSIS — I25.10 CORONARY ARTERY DISEASE INVOLVING NATIVE CORONARY ARTERY OF NATIVE HEART WITHOUT ANGINA PECTORIS: ICD-10-CM

## 2022-10-21 DIAGNOSIS — E08.65 DIABETES MELLITUS DUE TO UNDERLYING CONDITION WITH HYPERGLYCEMIA, WITHOUT LONG-TERM CURRENT USE OF INSULIN: ICD-10-CM

## 2022-10-21 DIAGNOSIS — J42 CHRONIC BRONCHITIS, UNSPECIFIED CHRONIC BRONCHITIS TYPE: ICD-10-CM

## 2022-10-21 DIAGNOSIS — E07.9 THYROID DYSFUNCTION: ICD-10-CM

## 2022-10-21 DIAGNOSIS — I10 ESSENTIAL HYPERTENSION: ICD-10-CM

## 2022-10-21 DIAGNOSIS — E78.5 DYSLIPIDEMIA: ICD-10-CM

## 2022-10-21 DIAGNOSIS — J06.9 UPPER RESPIRATORY TRACT INFECTION, UNSPECIFIED TYPE: ICD-10-CM

## 2022-10-21 DIAGNOSIS — R94.31 NONSPECIFIC ABNORMAL ELECTROCARDIOGRAM (ECG) (EKG): ICD-10-CM

## 2022-10-21 PROCEDURE — 99214 PR OFFICE/OUTPT VISIT, EST, LEVL IV, 30-39 MIN: ICD-10-PCS | Mod: S$GLB,,, | Performed by: INTERNAL MEDICINE

## 2022-10-21 PROCEDURE — 99214 OFFICE O/P EST MOD 30 MIN: CPT | Mod: S$GLB,,, | Performed by: INTERNAL MEDICINE

## 2022-10-21 RX ORDER — POTASSIUM CHLORIDE 750 MG/1
10 CAPSULE, EXTENDED RELEASE ORAL DAILY
Qty: 90 CAPSULE | Refills: 3 | Status: SHIPPED | OUTPATIENT
Start: 2022-10-21 | End: 2023-10-16

## 2022-10-21 RX ORDER — AMOXICILLIN AND CLAVULANATE POTASSIUM 875; 125 MG/1; MG/1
1 TABLET, FILM COATED ORAL 2 TIMES DAILY
Qty: 14 TABLET | Refills: 1 | Status: SHIPPED | OUTPATIENT
Start: 2022-10-21 | End: 2022-10-28

## 2022-10-21 NOTE — ASSESSMENT & PLAN NOTE
Currently being followed by primary care encouraged him to continue on metformin Amaryl and Trulicity.

## 2022-10-21 NOTE — ASSESSMENT & PLAN NOTE
Continue on present therapy to include Toprol-XL 50 mg daily and also on aspirin it could be reduced 81 mg a day and Plavix 75 mg daily.  Continue on secondary prevention with simvastatin 20 mg daily

## 2022-10-21 NOTE — ASSESSMENT & PLAN NOTE
Blood pressure is stable at 122 over a 60 mmHg continue Toprol-XL he is also on losartan at 50 mg once daily maintain on low-salt diet

## 2022-10-21 NOTE — ASSESSMENT & PLAN NOTE
Predominantly sinus congestion.  And will try Augmentin for a week and see this would improve along with some Mucinex Decongested therapy.

## 2022-10-21 NOTE — PROGRESS NOTES
Subjective:    Patient ID:  Jigar Acharya is a 67 y.o. male patient here for evaluation Coronary Artery Disease, Dyslipidemia, and Hypertension      History of Present Illness:   Patient is a 67-year-old gentleman with history of known coronary artery disease dyslipidemia on arterial hypertension seeking follow-up evaluation.  Patient denies having any chest discomfort arm neck or jaw pain no significant palpitations are noted no swelling in the lower extremities   He did develop head cold and congestion in the last couple days.    But no fevers no chills nose no nausea vomiting no blood in the stools or black stools          Review of patient's allergies indicates:   Allergen Reactions    Demerol [meperidine] Other (See Comments)     Low Blood Pressure       Past Medical History:   Diagnosis Date    Coronary artery disease 2004    Diabetes mellitus, type 2     Diverticulitis     GERD (gastroesophageal reflux disease)     Hyperlipidemia     Hypertension     Hypothyroidism 2003    Kidney stones     Pancreatitis     Sleep apnea      Past Surgical History:   Procedure Laterality Date    COLONOSCOPY  12/18/2019    COLONOSCOPY N/A 12/18/2019    Procedure: COLONOSCOPY;  Surgeon: Esequiel Eastman MD;  Location: [facility];  Service: Endoscopy;  Laterality: N/A;    Coronary Angiography  01/01/2004    CORONARY STENT PLACEMENT      HERNIA REPAIR      Inguinal    LEFT HEART CATHETERIZATION Left 3/29/2022    Procedure: Left heart cath;  Surgeon: Fransisco Bueno MD;  Location: [facility] CATH/EP LAB;  Service: Cardiology;  Laterality: Left;     Social History     Tobacco Use    Smoking status: Former    Smokeless tobacco: Never   Substance Use Topics    Alcohol use: No    Drug use: Never        Review of Systems:    As noted in HPI in addition      REVIEW OF SYSTEMS  CARDIOVASCULAR: No recent chest pain, palpitations, arm, neck, or jaw pain  RESPIRATORY: No recent fever, cough chills, positive head: Congestion.    : No blood in  the urine  GI: No Nausea, vomiting, constipation, diarrhea, blood, or reflux.  MUSCULOSKELETAL: No myalgias  NEURO: No lightheadedness or dizziness  EYES: No Double vision, blurry, vision or headache              Objective        Vitals:    10/21/22 1048   BP: 122/60   Pulse: 82   Resp: 16       LIPIDS - LAST 2   Lab Results   Component Value Date    CHOL 118 06/25/2021    HDL 32 (L) 06/25/2021    LDLCALC 60 06/25/2021    TRIG 187 (H) 06/25/2021    CHOLHDL 3.7 06/25/2021       CBC - LAST 2  Lab Results   Component Value Date    WBC 7.05 03/25/2022    WBC 10.85 03/03/2021    RBC 4.32 (L) 03/25/2022    RBC 3.98 (L) 03/03/2021    HGB 13.6 (L) 03/25/2022    HGB 12.5 (L) 03/03/2021    HCT 39.4 (L) 03/25/2022    HCT 36.8 (L) 03/03/2021    MCV 91 03/25/2022    MCV 93 03/03/2021    MCH 31.5 (H) 03/25/2022    MCH 31.4 (H) 03/03/2021    MCHC 34.5 03/25/2022    MCHC 34.0 03/03/2021    RDW 12.1 03/25/2022    RDW 12.4 03/03/2021     03/25/2022     (H) 03/03/2021    MPV 10.6 03/25/2022    MPV 9.9 03/03/2021    GRAN 4.2 03/25/2022    GRAN 58.7 03/25/2022    LYMPH 2.0 03/25/2022    LYMPH 28.7 03/25/2022    MONO 0.6 03/25/2022    MONO 8.1 03/25/2022    BASO 0.06 03/25/2022    BASO 0.02 03/03/2021    NRBC 0 03/25/2022    NRBC 0 03/03/2021       CHEMISTRY & LIVER FUNCTION - LAST 2  Lab Results   Component Value Date     (L) 03/25/2022     03/02/2021    K 3.9 03/25/2022    K 3.7 03/02/2021     03/25/2022     03/02/2021    CO2 22 (L) 03/25/2022    CO2 22 (L) 03/02/2021    ANIONGAP 9 03/25/2022    ANIONGAP 11 03/02/2021    BUN 13 03/25/2022    BUN 24 (H) 03/02/2021    CREATININE 1.1 03/25/2022    CREATININE 0.7 03/02/2021     (H) 03/25/2022     (H) 03/02/2021    CALCIUM 9.3 03/25/2022    CALCIUM 8.5 (L) 03/02/2021    MG 2.1 03/02/2021    MG 2.1 02/28/2021    ALBUMIN 3.1 (L) 02/28/2021    ALBUMIN 3.2 (L) 02/27/2021    PROT 7.2 02/28/2021    PROT 7.1 02/27/2021    ALKPHOS 52 (L)  02/28/2021    ALKPHOS 51 (L) 02/27/2021    ALT 22 02/28/2021    ALT 20 02/27/2021    AST 29 02/28/2021    AST 33 02/27/2021    BILITOT 0.9 02/28/2021    BILITOT 0.8 02/27/2021        CARDIAC PROFILE - LAST 2  Lab Results   Component Value Date    BNP 29 02/23/2021     02/23/2021     03/02/2021     03/01/2021    TROPONINI <0.030 02/23/2021        COAGULATION - LAST 2  No results found for: LABPT, INR, APTT    ENDOCRINE & PSA - LAST 2  Lab Results   Component Value Date    PROCAL 0.13 02/23/2021    PSA 0.84 03/27/2018        ECHOCARDIOGRAM RESULTS  Results for orders placed during the hospital encounter of 03/17/22    Echo    Interpretation Summary  · The left ventricle is normal in size with mild concentric hypertrophy and normal systolic function.  · The estimated ejection fraction is 61%.  · Normal left ventricular diastolic function.  · Normal right ventricular size with normal right ventricular systolic function.  · Mild left atrial enlargement.  · Normal central venous pressure (3 mmHg).      CURRENT/PREVIOUS VISIT EKG  Results for orders placed or performed in visit on 04/08/22   IN OFFICE EKG 12-LEAD (to [place])    Collection Time: 04/08/22  9:41 AM    Narrative    Test Reason : E78.5,    Vent. Rate : 074 BPM     Atrial Rate : 074 BPM     P-R Int : 188 ms          QRS Dur : 114 ms      QT Int : 432 ms       P-R-T Axes : 068 014 041 degrees     QTc Int : 479 ms    Normal sinus rhythm  Incomplete right bundle branch block  Cannot rule out Anterior infarct ,age undetermined  Abnormal ECG  When compared with ECG of 29-MAR-2022 09:07,  No significant change was found  Confirmed by Flavia COLON, Flo GARCIA ([id number]) on 4/17/2022 5:23:39 PM    Referred By: [email or web address]   SELF           Confirmed By:Flo Alejandro MD     No valid procedures specified.   Results for orders placed during the hospital encounter of 03/17/22    Nuclear Stress - Cardiology Interpreted    Interpretation Summary     Abnormal myocardial perfusion scan.    There is a moderate to severe intensity, moderate sized, mostly reversible perfusion abnormality with some fixed areas in the basal to mid inferolateral wall(s) in the typical distribution of the RCA territory.    The gated perfusion images showed an ejection fraction of 58% post stress. Normal ejection fraction is greater than 53%.    There is normal wall motion post stress.    LV cavity size is  and normal at stress.    The EKG portion of this study is abnormal but not diagnostic.    The patient reported no chest pain during the stress test.    Recommend further investigation if clinically indicated    No valid procedures specified.    PHYSICAL EXAM  CONSTITUTIONAL: Well built, well nourished in no apparent distress  NECK: no carotid bruit, no JVD  LUNGS: CTA  CHEST WALL: no tenderness  HEART: regular rate and rhythm, S1, S2 normal, no murmur, click, rub or gallop   ABDOMEN: soft, non-tender; bowel sounds normal; no masses,  no organomegaly  EXTREMITIES: Extremities normal, no edema, no calf tenderness noted  NEURO: AAO X 3    I HAVE REVIEWED :    The vital signs, nurses notes, and all the pertinent radiology and labs.        Current Outpatient Medications   Medication Instructions    albuterol (VENTOLIN HFA) 90 mcg/actuation inhaler 2 puffs, Inhalation, Every 6 hours PRN, Rescue    amoxicillin-clavulanate 875-125mg (AUGMENTIN) 875-125 mg per tablet 1 tablet, Oral, 2 times daily    ascorbic acid (vitamin C) (VITAMIN C) 1,000 mg, Oral, Daily    aspirin 325 mg, Oral, Daily    clopidogreL (PLAVIX) 75 mg, Oral, Daily    EScitalopram oxalate (LEXAPRO) 10 mg, Oral, Daily    fluocinolone (DERMA-SMOOTHE) 0.01 % external oil Topical (Top), 2 times daily, Use on AA BID x 2 weeks    glimepiride (AMARYL) 2 mg, Oral, 2 times daily    hydrocortisone 2.5 % cream Topical (Top), 2 times daily, Use on AA BID x 10-14 days    ketoconazole (NIZORAL) 2 % cream Topical (Top), 2 times daily     levothyroxine (SYNTHROID) 150 mcg, Oral, Daily    losartan (COZAAR) 50 mg, Oral, Daily    metFORMIN (GLUCOPHAGE-XR) 500 MG ER 24hr tablet TAKE TWO TABLETS BY MOUTH TWICE DAILY    metoprolol succinate (TOPROL-XL) 50 MG 24 hr tablet TAKE 1 AND 1/2 TABLETS(75 MG) BY MOUTH EVERY DAY    nitroGLYCERIN (NITROSTAT) 0.4 mg, Sublingual, Every 5 min PRN    omega-3 fatty acids/fish oil (FISH OIL-OMEGA-3 FATTY ACIDS) 300-1,000 mg capsule 1 capsule, Oral, Daily    ondansetron (ZOFRAN) 4 mg, Oral, Every 6 hours PRN    ONETOUCH DELICA LANCETS 30 gauge Misc TEST BID    ONETOUCH VERIO Strp TEST BID    ONETOUCH VERIO SYSTEM Misc TEST D    potassium chloride (MICRO-K) 10 MEQ CpSR 10 mEq, Oral, Daily    simvastatin (ZOCOR) 20 mg, Oral, Nightly    TRULICITY 1.5 mg, Subcutaneous, Weekly          Assessment & Plan     Coronary artery disease involving native coronary artery of native heart without angina pectoris  Continue on present therapy to include Toprol-XL 50 mg daily and also on aspirin it could be reduced 81 mg a day and Plavix 75 mg daily.  Continue on secondary prevention with simvastatin 20 mg daily    Essential hypertension  Blood pressure is stable at 122 over a 60 mmHg continue Toprol-XL he is also on losartan at 50 mg once daily maintain on low-salt diet    Dyslipidemia  Continue on Zocor maintain low-fat low-cholesterol diet    Nonspecific abnormal electrocardiogram (ECG) (EKG)  Maintain on medical therapy    Diabetes mellitus due to underlying condition with hyperglycemia, without long-term current use of insulin  Currently being followed by primary care encouraged him to continue on metformin Amaryl and Trulicity.    Thyroid dysfunction  He is on levothyroxine 150 mcg a day maintain the same regimen.    COPD (chronic obstructive pulmonary disease)  Relatively stable at this time, rescue inhaler p.r.n. basis.    Upper respiratory infection  Predominantly sinus congestion.  And will try Augmentin for a week and see this  would improve along with some Mucinex Decongested therapy.          Follow up in about 6 months (around 4/21/2023).

## 2022-12-20 RX ORDER — LOSARTAN POTASSIUM 50 MG/1
50 TABLET ORAL DAILY
Qty: 90 TABLET | Refills: 3 | Status: SHIPPED | OUTPATIENT
Start: 2022-12-20 | End: 2023-11-21

## 2022-12-20 NOTE — TELEPHONE ENCOUNTER
----- Message from Jacob Sloan sent at 12/20/2022 11:15 AM CST -----  Type:  RX Refill Request  Who Called: Pt   Refill or New Rx: refill  RX Name and Strength: losartan (COZAAR) 50 MG tablet 90 tablet   How is the patient currently taking it? (ex. 1XDay):    Is this a 30 day or 90 day RX:    Preferred Pharmacy with phone number:  [facility] [address] [facility] [address]  Phone:  [phone]  Fax:  [phone]  Local or Mail Order:  Local  Ordering Provider:  Fransisco Bueno MD  Best Call Back Number:  [phone]  Additional Information: Pt requesting refills on RX losartan (COZAAR) 50 MG tablet 90 tablet

## 2023-05-03 ENCOUNTER — OFFICE VISIT (OUTPATIENT)
Dept: CARDIOLOGY | Facility: CLINIC | Age: 68
End: 2023-05-03
Payer: MEDICARE

## 2023-05-03 VITALS
OXYGEN SATURATION: 98 % | WEIGHT: 251 LBS | SYSTOLIC BLOOD PRESSURE: 128 MMHG | BODY MASS INDEX: 35.14 KG/M2 | HEART RATE: 72 BPM | HEIGHT: 71 IN | RESPIRATION RATE: 16 BRPM | DIASTOLIC BLOOD PRESSURE: 76 MMHG

## 2023-05-03 DIAGNOSIS — E66.01 SEVERE OBESITY: ICD-10-CM

## 2023-05-03 DIAGNOSIS — E07.9 THYROID DYSFUNCTION: ICD-10-CM

## 2023-05-03 DIAGNOSIS — I25.10 CAD IN NATIVE ARTERY: Primary | ICD-10-CM

## 2023-05-03 DIAGNOSIS — Z95.5 PRESENCE OF DRUG-ELUTING STENT IN LEFT CIRCUMFLEX CORONARY ARTERY: ICD-10-CM

## 2023-05-03 DIAGNOSIS — I10 ESSENTIAL HYPERTENSION: ICD-10-CM

## 2023-05-03 DIAGNOSIS — E08.65 DIABETES MELLITUS DUE TO UNDERLYING CONDITION WITH HYPERGLYCEMIA, WITHOUT LONG-TERM CURRENT USE OF INSULIN: ICD-10-CM

## 2023-05-03 DIAGNOSIS — R94.39 ABNORMAL CARDIOVASCULAR STRESS TEST: ICD-10-CM

## 2023-05-03 DIAGNOSIS — Z95.5 PRESENCE OF DRUG-ELUTING STENT IN RIGHT CORONARY ARTERY: ICD-10-CM

## 2023-05-03 PROCEDURE — 99214 OFFICE O/P EST MOD 30 MIN: CPT | Mod: PBBFAC,PN | Performed by: INTERNAL MEDICINE

## 2023-05-03 PROCEDURE — 99999 PR PBB SHADOW E&M-EST. PATIENT-LVL IV: CPT | Mod: PBBFAC,,, | Performed by: INTERNAL MEDICINE

## 2023-05-03 PROCEDURE — 99214 PR OFFICE/OUTPT VISIT, EST, LEVL IV, 30-39 MIN: ICD-10-PCS | Mod: S$PBB,,, | Performed by: INTERNAL MEDICINE

## 2023-05-03 PROCEDURE — 99214 OFFICE O/P EST MOD 30 MIN: CPT | Mod: S$PBB,,, | Performed by: INTERNAL MEDICINE

## 2023-05-03 PROCEDURE — 99999 PR PBB SHADOW E&M-EST. PATIENT-LVL IV: ICD-10-PCS | Mod: PBBFAC,,, | Performed by: INTERNAL MEDICINE

## 2023-05-03 NOTE — PROGRESS NOTES
Subjective:    Patient ID:  Jigar Acharya is a 68 y.o. male     Chief Complaint   Patient presents with    Hospital Follow Up       HPI:  Ms Jigar Acharya is a 68 y.o. male is here for follow-up.    Patient has been doing well no specific complaints at the present time.  His breathing is good denies any shortness breath or difficulty in breathing denies any chest pain or tightness heaviness denies any dizziness or lightheadedness or loss of consciousness.        Review of patient's allergies indicates:   Allergen Reactions    Meperidine Other (See Comments)     Low Blood Pressure  Other reaction(s): Other (See Comments)  HYPOTENSION       Past Medical History:   Diagnosis Date    Coronary artery disease 2004    Diabetes mellitus, type 2     Diverticulitis     GERD (gastroesophageal reflux disease)     Hyperlipidemia     Hypertension     Hypothyroidism 2003    Kidney stones     Pancreatitis     Sleep apnea      Past Surgical History:   Procedure Laterality Date    COLONOSCOPY  12/18/2019    COLONOSCOPY N/A 12/18/2019    Procedure: COLONOSCOPY;  Surgeon: Esequiel Eastman MD;  Location: [facility];  Service: Endoscopy;  Laterality: N/A;    Coronary Angiography  01/01/2004    CORONARY STENT PLACEMENT      HERNIA REPAIR      Inguinal    LEFT HEART CATHETERIZATION Left 3/29/2022    Procedure: Left heart cath;  Surgeon: Fransisco Bueno MD;  Location: [facility] CATH/EP LAB;  Service: Cardiology;  Laterality: Left;     Social History     Tobacco Use    Smoking status: Former    Smokeless tobacco: Never   Substance Use Topics    Alcohol use: No    Drug use: Never     Family History   Problem Relation Age of Onset    No Known Problems Mother     No Known Problems Father     Colon cancer Paternal Aunt     Heart disease Maternal Grandfather         Review of Systems:   Constitution: Negative for diaphoresis and fever.   HEENT: Negative for nosebleeds.    Cardiovascular: Negative for chest pain       No dyspnea on exertion        No leg swelling        No palpitations  Respiratory: Negative for shortness of breath and wheezing.    Hematologic/Lymphatic: Negative for bleeding problem. Does not bruise/bleed easily.   Skin: Negative for color change and rash.   Musculoskeletal: Negative for falls and myalgias.   Gastrointestinal: Negative for hematemesis and hematochezia.   Genitourinary: Negative for hematuria.   Neurological: Negative for dizziness and light-headedness.   Psychiatric/Behavioral: Negative for altered mental status and memory loss.          Objective:        Vitals:    05/03/23 1424   BP: 128/76   Pulse: 72   Resp: 16       Lab Results   Component Value Date    WBC 7.05 03/25/2022    HGB 13.6 (L) 03/25/2022    HCT 39.4 (L) 03/25/2022     03/25/2022    CHOL 118 06/25/2021    TRIG 187 (H) 06/25/2021    HDL 32 (L) 06/25/2021    ALT 22 02/28/2021    AST 29 02/28/2021     (L) 03/25/2022    K 3.9 03/25/2022     03/25/2022    CREATININE 1.1 03/25/2022    BUN 13 03/25/2022    CO2 22 (L) 03/25/2022    PSA 0.84 03/27/2018        ECHOCARDIOGRAM RESULTS  Results for orders placed during the hospital encounter of 03/17/22    Echo    Interpretation Summary  · The left ventricle is normal in size with mild concentric hypertrophy and normal systolic function.  · The estimated ejection fraction is 61%.  · Normal left ventricular diastolic function.  · Normal right ventricular size with normal right ventricular systolic function.  · Mild left atrial enlargement.  · Normal central venous pressure (3 mmHg).        CURRENT/PREVIOUS VISIT EKG  Results for orders placed or performed in visit on 04/08/22   IN OFFICE EKG 12-LEAD (to [place])    Collection Time: 04/08/22  9:41 AM    Narrative    Test Reason : E78.5,    Vent. Rate : 074 BPM     Atrial Rate : 074 BPM     P-R Int : 188 ms          QRS Dur : 114 ms      QT Int : 432 ms       P-R-T Axes : 068 014 041 degrees     QTc Int : 479 ms    Normal sinus rhythm  Incomplete right  bundle branch block  Cannot rule out Anterior infarct ,age undetermined  Abnormal ECG  When compared with ECG of 29-MAR-2022 09:07,  No significant change was found  Confirmed by Flavia COLON, Flo GARCIA ([id number]) on 4/17/2022 5:23:39 PM    Referred By: MILDRED   SELF           Confirmed By:Flo Alejandro MD     No valid procedures specified.   Results for orders placed during the hospital encounter of 03/17/22    Nuclear Stress - Cardiology Interpreted    Interpretation Summary    Abnormal myocardial perfusion scan.    There is a moderate to severe intensity, moderate sized, mostly reversible perfusion abnormality with some fixed areas in the basal to mid inferolateral wall(s) in the typical distribution of the RCA territory.    The gated perfusion images showed an ejection fraction of 58% post stress. Normal ejection fraction is greater than 53%.    There is normal wall motion post stress.    LV cavity size is  and normal at stress.    The EKG portion of this study is abnormal but not diagnostic.    The patient reported no chest pain during the stress test.    Recommend further investigation if clinically indicated      Physical Exam:  CONSTITUTIONAL: No fever, no chills  HEENT: Normocephalic, atraumatic,pupils reactive to light                 NECK:  No JVD no carotid bruit  CVS: S1S2+, RRR, systolic murmurs,   LUNGS: Clear  ABDOMEN: Soft, NT, BS+  EXTREMITIES: No cyanosis, edema  : No sanchez catheter  NEURO: AAO X 3  PSY: Normal affect      Medication List with Changes/Refills   Current Medications    ALBUTEROL (VENTOLIN HFA) 90 MCG/ACTUATION INHALER    Inhale 2 puffs into the lungs every 6 (six) hours as needed for Wheezing or Shortness of Breath. Rescue    ASCORBIC ACID, VITAMIN C, (VITAMIN C) 1000 MG TABLET    Take 1,000 mg by mouth once daily.    ASPIRIN 325 MG TABLET    Take 325 mg by mouth once daily.    CLOPIDOGREL (PLAVIX) 75 MG TABLET    Take 1 tablet (75 mg total) by mouth once daily.     DULAGLUTIDE (TRULICITY) 1.5 MG/0.5 ML PEN INJECTOR    Inject 1.5 mg into the skin once a week.    FLUOCINOLONE (DERMA-SMOOTHE) 0.01 % EXTERNAL OIL    Apply topically 2 (two) times daily. Use on AA BID x 2 weeks    GLIMEPIRIDE (AMARYL) 2 MG TABLET    Take 2 mg by mouth 2 (two) times a day.    HYDROCORTISONE 2.5 % CREAM    Apply topically 2 (two) times daily. Use on AA BID x 10-14 days for 14 days    KETOCONAZOLE (NIZORAL) 2 % CREAM    Apply topically 2 (two) times daily.    LEVOTHYROXINE (SYNTHROID) 150 MCG TABLET    Take 1 tablet (150 mcg total) by mouth once daily.    LOSARTAN (COZAAR) 50 MG TABLET    Take 1 tablet (50 mg total) by mouth once daily.    METFORMIN (GLUCOPHAGE-XR) 500 MG ER 24HR TABLET    TAKE TWO TABLETS BY MOUTH TWICE DAILY    METOPROLOL SUCCINATE (TOPROL-XL) 50 MG 24 HR TABLET    TAKE 1 AND 1/2 TABLETS(75 MG) BY MOUTH EVERY DAY    NITROGLYCERIN (NITROSTAT) 0.4 MG SL TABLET    Place 1 tablet (0.4 mg total) under the tongue every 5 (five) minutes as needed for Chest pain.    OMEGA-3 FATTY ACIDS/FISH OIL (FISH OIL-OMEGA-3 FATTY ACIDS) 300-1,000 MG CAPSULE    Take 1 capsule by mouth once daily.    ONETOUCH DELICA LANCETS 30 GAUGE MISC    TEST BID    ONETOUCH VERIO STRP    TEST BID    ONETOUCH VERIO SYSTEM MISC    TEST D    POTASSIUM CHLORIDE (MICRO-K) 10 MEQ CPSR    Take 1 capsule (10 mEq total) by mouth once daily.    SIMVASTATIN (ZOCOR) 20 MG TABLET    Take 1 tablet (20 mg total) by mouth every evening.   Discontinued Medications    ESCITALOPRAM OXALATE (LEXAPRO) 10 MG TABLET    Take 10 mg by mouth once daily.     ONDANSETRON (ZOFRAN) 4 MG TABLET    Take 1 tablet (4 mg total) by mouth every 6 (six) hours as needed for Nausea.     Summary       The 1st Mrg lesion was 95% stenosed with 0% stenosis post-intervention.  The pre-procedure left ventricular end diastolic pressure was 14.  The post-procedure left ventricular end diastolic pressure was 15.  The left ventricular systolic function was  normal.  The left ventricular end diastolic pressure was normal.  The ejection fraction was calculated to be 65%.  The left ventricular ejection fraction was grossly normal.  A STENT ANDRES GABE 2.5 X 12 stent was successfully placed at 11 RUEL for 30 sec.  The estimated blood loss was <50 mL.  There was three vessel coronary artery disease.  The PCI was successful.     The procedure log was documented by No documenter listed and verified by Fransisco Bueno MD.     Date: 3/29/2022  Time: 12:15 PM    Diagnostic  Dominance: Right      Left Main   The vessel was visualized by angiography, is large and is angiographically normal.      Left Anterior Descending   The vessel was visualized by angiography. There is disease in the proximal portion of the vessel. The vessel is calcified.   Mid LAD-1 lesion is 20% stenosed. The lesion was < 10 mm long. The lesion shape was concentric. The lesion contour was smooth.   Mid LAD-2 lesion is 40% stenosed. The lesion was 15 mm long. The lesion proximal tortuosity was moderate.   Dist LAD lesion is 45% stenosed. This is not the culprit lesion. The lesion was 10 mm long. The lesion shape was concentric. The lesion contour was smooth.      Left Circumflex   The vessel was visualized by angiography. There is disease in the proximal portion of the vessel. The vessel is moderately calcified.   Previously placed Prox Cx to Mid Cx stent (unknown type) is widely patent. The lesion was 30 mm long. The lesion shape was concentric. The previous treatment took place at an unknown date.      First Obtuse Marginal Branch   1st Mrg lesion is 95% stenosed. This was the culprit lesion.      Right Coronary Artery   Prox RCA lesion is 30% stenosed. The lesion was < 10 mm long. The lesion shape was concentric. The lesion contour was smooth. The previous treatment took place at an unknown date.   Previously placed Prox RCA to Mid RCA stent (unknown type) is widely patent. The lesion was 40 mm long. The  lesion shape was concentric. The lesion contour was smooth. The previous treatment took place at an unknown date.The stenosis was measured by a visual reading.   Dist RCA lesion is 30% stenosed. The lesion was calcified. The lesion was < 10 mm long. The calcification amount was mild. The lesion shape was concentric. The lesion contour was smooth.        Intervention          1st Mrg lesion   Stent   A drug-eluting stent was successfully placed. The stent used was a STENT ANDRES GABE 2.5 X 12. The stent was deployed by way of balloon expansion. The maximum pressure reached was 11 kymberly. The inflation time was 30 sec.   Supplies used: STENT ANDRES GABE 2.5 X 12   Post-Intervention Lesion Assessment   The intervention was successful. The guidewire crossed the lesion. The device was deployed. The diagnostic MARGARET flow was 2. The post-intervention MARGARET flow was 3.   There is a 0% residual stenosis post intervention.                Left Heart Findings      Left Ventricle    The left ventricular size is normal. The left ventricular systolic function is normal. LV systolic pressure is normal. The left ventricular ejection fraction is grossly normal. The ejection fraction is calculated to be 65%. LV end diastolic pressure is normal.   LVEDP (Pre):14 mmHGLVEDP (Post):15 mmHGThere are no wall motion abnormalities in the left ventricle.     Wall Motion        All segments of the heart are normal.                   Recommendations     Routine post PCI care.   Maximize medical management.   Risk factor reductions.   ASA 81mg.   Plavix for one year.   Cardiac rehab referral.   Weight loss.   Statin therapy.   Clopidogrel for six months.   Follow up in [facility] with Follow up in the office in 2-3 weeks.             Assessment:       1. CAD in native artery    2. Presence of drug-eluting stent in left circumflex coronary artery    3. Presence of drug-eluting stent in right coronary artery    4. Essential hypertension    5. Diabetes  mellitus due to underlying condition with hyperglycemia, without long-term current use of insulin    6. Thyroid dysfunction    7. Abnormal cardiovascular stress test    8. Severe obesity         Plan:   1. Coronary artery disease status post PCI   Patient is doing well at the present time.  And he is currently on Plavix 75 mg p.o. daily and aspirin 325 mg p.o. daily.  Discussed with patient in regards with aspirin patient had been continuing his 325 mg.  Because he had them and will switch over when he is run out of the 325 mg tablets.  2. Essential hypertension  Patient's blood pressure is stable at 120 8/76 he is currently on losartan 50 mg p.o. daily continue the same he is also on metoprolol succinate 50 mg p.o. daily.    3. Mixed hyperlipidemia  He is currently on simvastatin 20 mg p.o. daily continue the same.  And he follows up with his primary physician in regards with his cholesterol and liver function test.    4. Diabetes mellitus   Patient is currently on metformin 500 mg p.o. daily and omega-3 fatty acids as well as in Trulicity and Amaryl 2 mg p.o. b.i.d..  Continue the same.  And his primary physician is checking his HbA1c and the blood sugars.    5. Hypothyroidism   Patient is taking levothyroxine 150 mcg p.o. daily continue the same and his TSH and free T4 levels are being checked.    Number 6.  Obesity   Patient to continue low-fat low-cholesterol diet.  Patient would benefit from losing weight.  He is currently weighing 251 lb even 20 lb would work tremendously on him.  7. Continue current management I will see him back in the office in 6 months time.        Problem List Items Addressed This Visit          Cardiac/Vascular    Essential hypertension    Abnormal cardiovascular stress test       Endocrine    Diabetes mellitus due to underlying condition with hyperglycemia, without long-term current use of insulin    Thyroid dysfunction     Other Visit Diagnoses       CAD in native artery    -  Primary     Presence of drug-eluting stent in left circumflex coronary artery        Presence of drug-eluting stent in right coronary artery        Severe obesity                No follow-ups on file.

## 2023-06-12 RX ORDER — METFORMIN HYDROCHLORIDE 500 MG/1
TABLET, EXTENDED RELEASE ORAL
Qty: 180 TABLET | Refills: 3 | Status: SHIPPED | OUTPATIENT
Start: 2023-06-12

## 2023-09-26 RX ORDER — SIMVASTATIN 20 MG/1
20 TABLET, FILM COATED ORAL NIGHTLY
Qty: 90 TABLET | Refills: 3 | Status: SHIPPED | OUTPATIENT
Start: 2023-09-26

## 2023-09-26 NOTE — TELEPHONE ENCOUNTER
----- Message from Ashley Presley sent at 9/26/2023 12:17 PM CDT -----  Refill:   Name of Caller: Patient  Best Call Back Number:  [phone]  Additional Information: Patient called to have a 90 day supply sent to the pharmacy below  Prescription Name: simvastatin (ZOCOR) 20 MG tablet  Pharmacy Name:   [facility] [id number]   Phone:  [phone]  Fax:  [phone]

## 2023-10-16 RX ORDER — POTASSIUM CHLORIDE 750 MG/1
CAPSULE, EXTENDED RELEASE ORAL
Qty: 90 CAPSULE | Refills: 3 | Status: SHIPPED | OUTPATIENT
Start: 2023-10-16

## 2023-11-01 ENCOUNTER — OFFICE VISIT (OUTPATIENT)
Dept: PODIATRY | Facility: CLINIC | Age: 68
End: 2023-11-01
Payer: MEDICARE

## 2023-11-01 VITALS — WEIGHT: 243.38 LBS | HEIGHT: 71 IN | BODY MASS INDEX: 34.07 KG/M2

## 2023-11-01 DIAGNOSIS — M79.672 LEFT FOOT PAIN: ICD-10-CM

## 2023-11-01 DIAGNOSIS — L85.1 ACQUIRED KERATODERMA: Primary | ICD-10-CM

## 2023-11-01 PROCEDURE — 99213 OFFICE O/P EST LOW 20 MIN: CPT | Mod: PBBFAC,PN | Performed by: PODIATRIST

## 2023-11-01 PROCEDURE — 99203 PR OFFICE/OUTPT VISIT, NEW, LEVL III, 30-44 MIN: ICD-10-PCS | Mod: S$PBB,,, | Performed by: PODIATRIST

## 2023-11-01 PROCEDURE — 99999 PR PBB SHADOW E&M-EST. PATIENT-LVL III: ICD-10-PCS | Mod: PBBFAC,,, | Performed by: PODIATRIST

## 2023-11-01 PROCEDURE — 99203 OFFICE O/P NEW LOW 30 MIN: CPT | Mod: S$PBB,,, | Performed by: PODIATRIST

## 2023-11-01 PROCEDURE — 99999 PR PBB SHADOW E&M-EST. PATIENT-LVL III: CPT | Mod: PBBFAC,,, | Performed by: PODIATRIST

## 2023-11-01 RX ORDER — SEMAGLUTIDE 2.68 MG/ML
2 INJECTION, SOLUTION SUBCUTANEOUS
COMMUNITY
Start: 2023-10-23

## 2023-11-01 NOTE — PROGRESS NOTES
"  [address] [place] [address]  Phone: [phone]   [phone]    Patient's PCP:Pancho Onofre MD  Referring Provider: Aaareferral Self    Subjective:      Chief Complaint:: Foot Pain (Pain in the ball of the left )    Foot Pain  Pertinent negatives include no abdominal pain, arthralgias, chest pain, chills, coughing, fatigue, fever, headaches, joint swelling, myalgias, nausea, neck pain, numbness, rash or weakness.     Jigar Acharya is a 68 y.o. male who presents today with a complaint of pain in the ball of the foot . The current episode started almost a year and a half ago.  The symptoms include sharp pain with walking. Probable cause of complaint hard spot on the ball of the foot.  The symptoms are aggravated by pressure. The problem has improved. Treatment to date have included pt "dug out hard skin last night" which provided some relief.     Systemic Doctor:   Date Last Seen: Last week pt reported  Blood Sugar: 158  Hemoglobin A1c: 6.6 pt reported    Vitals:    11/01/23 1347   Weight: 110.4 kg (243 lb 6.2 oz)   Height: 5' 11" (1.803 m)   PainSc:   8      Shoe Size: 11.5    Past Surgical History:   Procedure Laterality Date    COLONOSCOPY  12/18/2019    COLONOSCOPY N/A 12/18/2019    Procedure: COLONOSCOPY;  Surgeon: Esequiel Eastman MD;  Location: [facility];  Service: Endoscopy;  Laterality: N/A;    Coronary Angiography  01/01/2004    CORONARY STENT PLACEMENT      HERNIA REPAIR      Inguinal    LEFT HEART CATHETERIZATION Left 3/29/2022    Procedure: Left heart cath;  Surgeon: Fransisco Bueno MD;  Location: [facility] CATH/EP LAB;  Service: Cardiology;  Laterality: Left;     Past Medical History:   Diagnosis Date    Coronary artery disease 2004    Diabetes mellitus, type 2     Diverticulitis     GERD (gastroesophageal reflux disease)     Hyperlipidemia     Hypertension     Hypothyroidism 2003    Kidney stones     Pancreatitis     Sleep apnea      Family History   Problem " Relation Age of Onset    No Known Problems Mother     No Known Problems Father     Colon cancer Paternal Aunt     Heart disease Maternal Grandfather         Social History:   Marital Status:   Alcohol History:  reports no history of alcohol use.  Tobacco History:  reports that he has quit smoking. He has never used smokeless tobacco.  Drug History:  reports no history of drug use.    Review of patient's allergies indicates:   Allergen Reactions    Meperidine Other (See Comments)     Low Blood Pressure  Other reaction(s): Other (See Comments)  HYPOTENSION       Current Outpatient Medications   Medication Sig Dispense Refill    ascorbic acid, vitamin C, (VITAMIN C) 1000 MG tablet Take 1,000 mg by mouth once daily.      OZEMPIC 2 mg/dose (8 mg/3 mL) PnIj Inject 2 mg into the skin every 7 days.      albuterol (VENTOLIN HFA) 90 mcg/actuation inhaler Inhale 2 puffs into the lungs every 6 (six) hours as needed for Wheezing or Shortness of Breath. Rescue (Patient not taking: Reported on 11/1/2023) 8 g 2    aspirin 325 MG tablet Take 325 mg by mouth once daily.      clopidogreL (PLAVIX) 75 mg tablet Take 1 tablet (75 mg total) by mouth once daily. 30 tablet 11    fluocinolone (DERMA-SMOOTHE) 0.01 % external oil Apply topically 2 (two) times daily. Use on AA BID x 2 weeks 118 mL 1    glimepiride (AMARYL) 2 MG tablet Take 2 mg by mouth 2 (two) times a day.      hydrocortisone 2.5 % cream Apply topically 2 (two) times daily. Use on AA BID x 10-14 days for 14 days 28 g 2    ketoconazole (NIZORAL) 2 % cream Apply topically 2 (two) times daily. 60 g 2    levothyroxine (SYNTHROID) 150 MCG tablet Take 1 tablet (150 mcg total) by mouth once daily. 90 tablet 3    losartan (COZAAR) 50 MG tablet Take 1 tablet (50 mg total) by mouth once daily. 90 tablet 3    metFORMIN (GLUCOPHAGE-XR) 500 MG ER 24hr tablet TAKE TWO TABLETS BY MOUTH TWICE DAILY (Patient not taking: Reported on 11/1/2023) 180 tablet 3    metoprolol succinate  (TOPROL-XL) 50 MG 24 hr tablet TAKE 1 AND 1/2 TABLETS (75 MG) BY MOUTH EVERY  tablet 2    nitroGLYCERIN (NITROSTAT) 0.4 MG SL tablet Place 1 tablet (0.4 mg total) under the tongue every 5 (five) minutes as needed for Chest pain. 25 tablet 6    omega-3 fatty acids/fish oil (FISH OIL-OMEGA-3 FATTY ACIDS) 300-1,000 mg capsule Take 1 capsule by mouth once daily.      ONETOUCH DELICA LANCETS 30 gauge Misc TEST BID  12    ONETOUCH VERIO Strp TEST BID  12    ONETOUCH VERIO SYSTEM Misc TEST D  0    potassium chloride (MICRO-K) 10 MEQ CpSR TAKE ONE CAPSULE (10 MEQ) BY MOUTH ONCE DAILY 90 capsule 3    simvastatin (ZOCOR) 20 MG tablet Take 1 tablet (20 mg total) by mouth every evening. 90 tablet 3     No current facility-administered medications for this visit.       Review of Systems   Constitutional:  Negative for chills, fatigue, fever and unexpected weight change.   HENT:  Negative for hearing loss and trouble swallowing.    Eyes:  Negative for photophobia and visual disturbance.   Respiratory:  Negative for cough, shortness of breath and wheezing.    Cardiovascular:  Negative for chest pain, palpitations and leg swelling.   Gastrointestinal:  Negative for abdominal pain and nausea.   Genitourinary:  Negative for dysuria and frequency.   Musculoskeletal:  Negative for arthralgias, back pain, gait problem, joint swelling, myalgias and neck pain.   Skin:  Negative for rash and wound.   Neurological:  Negative for tremors, seizures, weakness, numbness and headaches.   Hematological:  Does not bruise/bleed easily.         Objective:        Physical Exam:   Foot Exam    General  General Appearance: appears stated age and healthy   Orientation: alert and oriented to person, place, and time   Affect: appropriate   Gait: unimpaired       Left Foot/Ankle      Inspection and Palpation  Ecchymosis: none  Tenderness: lesser metatarsophalangeal joints   Swelling: none   Arch: normal  Hammertoes: absent  Claw toes: absent  Hallux  valgus: no  Hallux limitus: no  Skin Exam: callus; no drainage, no ulcer and no erythema   Neurovascular  Dorsalis pedis: 2+  Posterior tibial: 2+  Capillary refill: 2+  Varicose veins: not present  Saphenous nerve sensation: normal  Tibial nerve sensation: normal  Superficial peroneal nerve sensation: normal  Deep peroneal nerve sensation: normal  Sural nerve sensation: normal    Muscle Strength  Ankle dorsiflexion: 5  Ankle plantar flexion: 5  Ankle inversion: 5  Ankle eversion: 5  Great toe extension: 5  Great toe flexion: 5    Range of Motion    Normal left ankle ROM    Tests  Anterior drawer: negative   Talar tilt: negative   PT Tinel's sign: negative  Paresthesia: negative  Comments  Thickened keratosis underlying the 3rd MPJ    Physical Exam  Cardiovascular:      Pulses:           Dorsalis pedis pulses are 2+ on the left side.        Posterior tibial pulses are 2+ on the left side.   Musculoskeletal:      Left foot: No bunion.   Feet:      Left foot:      Skin integrity: Callus present. No ulcer or erythema.               Left Ankle/Foot Exam     Tenderness   The patient is tender to palpation of the lesser metatarsophalangeal joints.    Range of Motion   The patient has normal left ankle ROM.     Comments:  Thickened keratosis underlying the 3rd MPJ      Muscle Strength   Left Lower Extremity   Ankle Dorsiflexion:  5   Plantar flexion:  5/5     Vascular Exam       Left Pulses  Dorsalis Pedis:      2+  Posterior Tibial:      2+           Imaging: none            Assessment:       1. Acquired keratoderma    2. Left foot pain      Plan:   Acquired keratoderma    Left foot pain      Follow up if symptoms worsen or fail to improve.    Procedures        I trimmed the thickened keratosis underlying the left foot as a courtesy.  Patient tolerated well.  I explained that the underlying skin appears intact.  I discussed that the callus comes from excess pressure and we discussed proper shoe gear to help limit this.   Also recommend pumice stone and urea cream 40% for continued management.    Counseling:     I provided patient education verbally regarding:   Patient diagnosis, treatment options, as well as alternatives, risks, and benefits.     This note was created using Dragon voice recognition software that occasionally misinterpreted phrases or words.

## 2023-11-08 ENCOUNTER — OFFICE VISIT (OUTPATIENT)
Dept: CARDIOLOGY | Facility: CLINIC | Age: 68
End: 2023-11-08
Payer: MEDICARE

## 2023-11-08 VITALS
HEART RATE: 68 BPM | RESPIRATION RATE: 16 BRPM | OXYGEN SATURATION: 95 % | DIASTOLIC BLOOD PRESSURE: 68 MMHG | HEIGHT: 71 IN | SYSTOLIC BLOOD PRESSURE: 124 MMHG | WEIGHT: 239 LBS | BODY MASS INDEX: 33.46 KG/M2

## 2023-11-08 DIAGNOSIS — I10 ESSENTIAL HYPERTENSION: ICD-10-CM

## 2023-11-08 DIAGNOSIS — I25.10 CORONARY ARTERY DISEASE INVOLVING NATIVE CORONARY ARTERY OF NATIVE HEART WITHOUT ANGINA PECTORIS: ICD-10-CM

## 2023-11-08 DIAGNOSIS — R94.31 NONSPECIFIC ABNORMAL ELECTROCARDIOGRAM (ECG) (EKG): ICD-10-CM

## 2023-11-08 DIAGNOSIS — E11.65 TYPE 2 DIABETES MELLITUS WITH HYPERGLYCEMIA, WITHOUT LONG-TERM CURRENT USE OF INSULIN: ICD-10-CM

## 2023-11-08 DIAGNOSIS — E07.9 THYROID DYSFUNCTION: ICD-10-CM

## 2023-11-08 DIAGNOSIS — E78.5 DYSLIPIDEMIA: ICD-10-CM

## 2023-11-08 PROCEDURE — 99214 OFFICE O/P EST MOD 30 MIN: CPT | Mod: S$GLB,,, | Performed by: INTERNAL MEDICINE

## 2023-11-08 PROCEDURE — 99214 PR OFFICE/OUTPT VISIT, EST, LEVL IV, 30-39 MIN: ICD-10-PCS | Mod: S$GLB,,, | Performed by: INTERNAL MEDICINE

## 2023-11-08 RX ORDER — LEVOTHYROXINE SODIUM 137 UG/1
137 TABLET ORAL DAILY
COMMUNITY
Start: 2023-06-05

## 2023-11-08 RX ORDER — EMPAGLIFLOZIN, METFORMIN HYDROCHLORIDE 12.5; 1 MG/1; MG/1
TABLET, EXTENDED RELEASE ORAL
COMMUNITY
Start: 2023-11-07

## 2023-11-08 NOTE — ASSESSMENT & PLAN NOTE
Clinically stable is doing well continue on Plavix change aspirin to 81 mg daily baby aspirin and continue on Toprol-XL 50 mg daily and risk factor modification with simvastatin 20 mg nightly.  He had some blood work done with endocrinologist I will be happy to review that when made available

## 2023-11-08 NOTE — PROGRESS NOTES
Subjective:    Patient ID:  Jigar Acharya is a 68 y.o. male patient here for evaluation Follow-up      History of Present Illness:     Patient is a 68-year-old gentleman with history of known coronary artery disease arterial hypertension thyroid dysfunction type 2 diabetes dyslipidemia seeking follow-up evaluation.  He has established with a new endocrinologist some of his medications have been changed and he is doing better.  No occurrence of any angina no arm neck or jaw pain no shortness of breath and no edema noted in the lower extremities.  And his thyroid supplements also change to unit thyroid.  Clinically he remains relatively stable        Review of patient's allergies indicates:   Allergen Reactions    Meperidine Other (See Comments)     Low Blood Pressure  Other reaction(s): Other (See Comments)  HYPOTENSION       Past Medical History:   Diagnosis Date    Coronary artery disease 2004    Diabetes mellitus, type 2     Diverticulitis     GERD (gastroesophageal reflux disease)     Hyperlipidemia     Hypertension     Hypothyroidism 2003    Kidney stones     Pancreatitis     Sleep apnea      Past Surgical History:   Procedure Laterality Date    COLONOSCOPY  12/18/2019    COLONOSCOPY N/A 12/18/2019    Procedure: COLONOSCOPY;  Surgeon: Esequiel Eastman MD;  Location: [facility];  Service: Endoscopy;  Laterality: N/A;    Coronary Angiography  01/01/2004    CORONARY STENT PLACEMENT      HERNIA REPAIR      Inguinal    LEFT HEART CATHETERIZATION Left 3/29/2022    Procedure: Left heart cath;  Surgeon: Fransisco Bueno MD;  Location: [facility] CATH[facility]EP LAB;  Service: Cardiology;  Laterality: Left;     Social History     Tobacco Use    Smoking status: Former    Smokeless tobacco: Never   Substance Use Topics    Alcohol use: No    Drug use: Never        Review of Systems:    As noted in HPI in addition      REVIEW OF SYSTEMS  CARDIOVASCULAR: No recent chest pain, palpitations, arm, neck, or jaw pain  RESPIRATORY: No recent  fever, cough chills, SOB or congestion  : No blood in the urine  GI: No Nausea, vomiting, constipation, diarrhea, blood, or reflux.  MUSCULOSKELETAL: No myalgias  NEURO: No lightheadedness or dizziness  EYES: No Double vision, blurry, vision or headache              Objective        Vitals:    11/08/23 1159   BP: 124/68   Pulse: 68   Resp: 16       LIPIDS - LAST 2   Lab Results   Component Value Date    CHOL 118 06/25/2021    HDL 32 (L) 06/25/2021    LDLCALC 60 06/25/2021    TRIG 187 (H) 06/25/2021    CHOLHDL 3.7 06/25/2021       CBC - LAST 2  Lab Results   Component Value Date    WBC 7.05 03/25/2022    WBC 10.85 03/03/2021    RBC 4.32 (L) 03/25/2022    RBC 3.98 (L) 03/03/2021    HGB 13.6 (L) 03/25/2022    HGB 12.5 (L) 03/03/2021    HCT 39.4 (L) 03/25/2022    HCT 36.8 (L) 03/03/2021    MCV 91 03/25/2022    MCV 93 03/03/2021    MCH 31.5 (H) 03/25/2022    MCH 31.4 (H) 03/03/2021    MCHC 34.5 03/25/2022    MCHC 34.0 03/03/2021    RDW 12.1 03/25/2022    RDW 12.4 03/03/2021     03/25/2022     (H) 03/03/2021    MPV 10.6 03/25/2022    MPV 9.9 03/03/2021    GRAN 4.2 03/25/2022    GRAN 58.7 03/25/2022    LYMPH 2.0 03/25/2022    LYMPH 28.7 03/25/2022    MONO 0.6 03/25/2022    MONO 8.1 03/25/2022    BASO 0.06 03/25/2022    BASO 0.02 03/03/2021    NRBC 0 03/25/2022    NRBC 0 03/03/2021       CHEMISTRY & LIVER FUNCTION - LAST 2  Lab Results   Component Value Date     (L) 03/25/2022     03/02/2021    K 3.9 03/25/2022    K 3.7 03/02/2021     03/25/2022     03/02/2021    CO2 22 (L) 03/25/2022    CO2 22 (L) 03/02/2021    ANIONGAP 9 03/25/2022    ANIONGAP 11 03/02/2021    BUN 13 03/25/2022    BUN 24 (H) 03/02/2021    CREATININE 1.1 03/25/2022    CREATININE 0.7 03/02/2021     (H) 03/25/2022     (H) 03/02/2021    CALCIUM 9.3 03/25/2022    CALCIUM 8.5 (L) 03/02/2021    MG 2.1 03/02/2021    MG 2.1 02/28/2021    ALBUMIN 3.1 (L) 02/28/2021    ALBUMIN 3.2 (L) 02/27/2021    PROT 7.2  "02/28/2021    PROT 7.1 02/27/2021    ALKPHOS 52 (L) 02/28/2021    ALKPHOS 51 (L) 02/27/2021    ALT 22 02/28/2021    ALT 20 02/27/2021    AST 29 02/28/2021    AST 33 02/27/2021    BILITOT 0.9 02/28/2021    BILITOT 0.8 02/27/2021        CARDIAC PROFILE - LAST 2  Lab Results   Component Value Date    BNP 29 02/23/2021     02/23/2021     03/02/2021     03/01/2021    TROPONINI <0.030 02/23/2021        COAGULATION - LAST 2  No results found for: "LABPT", "INR", "APTT"    ENDOCRINE & PSA - LAST 2  Lab Results   Component Value Date    PROCAL 0.13 02/23/2021    PSA 0.84 03/27/2018        ECHOCARDIOGRAM RESULTS  Results for orders placed during the hospital encounter of 03/17/22    Echo    Interpretation Summary  · The left ventricle is normal in size with mild concentric hypertrophy and normal systolic function.  · The estimated ejection fraction is 61%.  · Normal left ventricular diastolic function.  · Normal right ventricular size with normal right ventricular systolic function.  · Mild left atrial enlargement.  · Normal central venous pressure (3 mmHg).      CURRENT/PREVIOUS VISIT EKG  Results for orders placed or performed in visit on 04/08/22   IN OFFICE EKG 12-LEAD (to [place])    Collection Time: 04/08/22  9:41 AM    Narrative    Test Reason : E78.5,    Vent. Rate : 074 BPM     Atrial Rate : 074 BPM     P-R Int : 188 ms          QRS Dur : 114 ms      QT Int : 432 ms       P-R-T Axes : 068 014 041 degrees     QTc Int : 479 ms    Normal sinus rhythm  Incomplete right bundle branch block  Cannot rule out Anterior infarct ,age undetermined  Abnormal ECG  When compared with ECG of 29-MAR-2022 09:07,  No significant change was found  Confirmed by Flavia COLON, Flo GARCIA ([id number]) on 4/17/2022 5:23:39 PM    Referred By: [email or web address]   SELF           Confirmed By:Flo Alejandro MD     No valid procedures specified.   Results for orders placed during the hospital encounter of 03/17/22    Nuclear Stress - " Cardiology Interpreted    Interpretation Summary    Abnormal myocardial perfusion scan.    There is a moderate to severe intensity, moderate sized, mostly reversible perfusion abnormality with some fixed areas in the basal to mid inferolateral wall(s) in the typical distribution of the RCA territory.    The gated perfusion images showed an ejection fraction of 58% post stress. Normal ejection fraction is greater than 53%.    There is normal wall motion post stress.    LV cavity size is  and normal at stress.    The EKG portion of this study is abnormal but not diagnostic.    The patient reported no chest pain during the stress test.    Recommend further investigation if clinically indicated    No valid procedures specified.    PHYSICAL EXAM  CONSTITUTIONAL: Well built, well nourished in no apparent distress  Facial plethora noted  NECK: no carotid bruit, no JVD  LUNGS: CTA  CHEST WALL: no tenderness  HEART: regular rate and rhythm, S1, S2 normal, no murmur, click, rub or gallop   ABDOMEN: soft, non-tender; bowel sounds normal; no masses,  no organomegaly  EXTREMITIES: Extremities normal, no edema, no calf tenderness noted  NEURO: AAO X 3    I HAVE REVIEWED :    The vital signs, nurses notes, and all the pertinent radiology and labs.        Current Outpatient Medications   Medication Instructions    albuterol (VENTOLIN HFA) 90 mcg/actuation inhaler 2 puffs, Inhalation, Every 6 hours PRN, Rescue    ascorbic acid (vitamin C) (VITAMIN C) 1,000 mg, Oral, Daily    aspirin 325 mg, Oral, Daily    clopidogreL (PLAVIX) 75 mg, Oral, Daily    fluocinolone (DERMA-SMOOTHE) 0.01 % external oil Topical (Top), 2 times daily, Use on AA BID x 2 weeks    glimepiride (AMARYL) 2 mg, Oral, 2 times daily    hydrocortisone 2.5 % cream Topical (Top), 2 times daily, Use on AA BID x 10-14 days    ketoconazole (NIZORAL) 2 % cream Topical (Top), 2 times daily    levothyroxine (UNITHROID) 137 mcg, Oral, Daily    losartan (COZAAR) 50 mg, Oral,  Daily    metFORMIN (GLUCOPHAGE-XR) 500 MG ER 24hr tablet TAKE TWO TABLETS BY MOUTH TWICE DAILY    metoprolol succinate (TOPROL-XL) 50 MG 24 hr tablet TAKE 1 AND 1/2 TABLETS (75 MG) BY MOUTH EVERY DAY    nitroGLYCERIN (NITROSTAT) 0.4 mg, Sublingual, Every 5 min PRN    omega-3 fatty acids/fish oil (FISH OIL-OMEGA-3 FATTY ACIDS) 300-1,000 mg capsule 1 capsule, Oral, Daily    ONETOUCH DELICA LANCETS 30 gauge Misc TEST BID    ONETOUCH VERIO Strp TEST BID    ONETOUCH VERIO SYSTEM Misc TEST D    OZEMPIC 2 mg, Subcutaneous, Every 7 days    potassium chloride (MICRO-K) 10 MEQ CpSR TAKE ONE CAPSULE (10 MEQ) BY MOUTH ONCE DAILY    simvastatin (ZOCOR) 20 mg, Oral, Nightly    SYNJARDY XR 12.5-1,000 mg TBph Oral          Assessment & Plan     Coronary artery disease involving native coronary artery of native heart without angina pectoris  Clinically stable is doing well continue on Plavix change aspirin to 81 mg daily baby aspirin and continue on Toprol-XL 50 mg daily and risk factor modification with simvastatin 20 mg nightly.  He had some blood work done with endocrinologist I will be happy to review that when made available    Essential hypertension  Blood pressure is stable at 120 4/68 mm Hg blood pressure has been stable losartan 50 mg once a day he is on metoprolol maintain on low-salt low-fat diet.  Continue diet and exercise regular basis especially when the weather is pleasant and do it in the middle of the    Dyslipidemia  Dyslipidemia maintain low-fat low-cholesterol diet and Zocor 20 mg nightly.  And his other diabetic modifying drugs as well continue the same    Nonspecific abnormal electrocardiogram (ECG) (EKG)  Clinically stable.    Thyroid dysfunction  His supplements have been changed to unit thyroid at 137 mcg a day.    Type 2 diabetes mellitus with hyperglycemia, without long-term current use of insulin  His medications have been modified to include Synjardy, Amaryl 2 mg twice a day maintain on calorie  restricted diet follow-up with endocrinologist          Follow up in about 6 months (around 5/8/2024).

## 2023-11-08 NOTE — ASSESSMENT & PLAN NOTE
Dyslipidemia maintain low-fat low-cholesterol diet and Zocor 20 mg nightly.  And his other diabetic modifying drugs as well continue the same

## 2023-11-08 NOTE — ASSESSMENT & PLAN NOTE
Blood pressure is stable at 120 4/68 mm Hg blood pressure has been stable losartan 50 mg once a day he is on metoprolol maintain on low-salt low-fat diet.  Continue diet and exercise regular basis especially when the weather is pleasant and do it in the middle of the

## 2023-11-08 NOTE — ASSESSMENT & PLAN NOTE
His medications have been modified to include Synjardy, Amaryl 2 mg twice a day maintain on calorie restricted diet follow-up with endocrinologist

## 2023-11-21 RX ORDER — LOSARTAN POTASSIUM 50 MG/1
TABLET ORAL
Qty: 90 TABLET | Refills: 3 | Status: SHIPPED | OUTPATIENT
Start: 2023-11-21

## 2024-02-12 RX ORDER — CLOPIDOGREL BISULFATE 75 MG/1
75 TABLET ORAL DAILY
Qty: 90 TABLET | Refills: 3 | Status: SHIPPED | OUTPATIENT
Start: 2024-02-12 | End: 2024-06-18 | Stop reason: SDUPTHER

## 2024-03-13 ENCOUNTER — TELEPHONE (OUTPATIENT)
Dept: CARDIOLOGY | Facility: CLINIC | Age: 69
End: 2024-03-13
Payer: MEDICARE

## 2024-03-13 NOTE — TELEPHONE ENCOUNTER
----- Message from Yuly Martinez, Patient Care Assistant sent at 3/13/2024  9:49 AM CDT -----  Regarding: advice  Contact: pt  Type: Needs Medical Advice    Who Called:  pt    Best Call Back Number: [phone] (home)     Additional Information: pt states he would like a callback regarding an medical clearance for eye surgery. Please call to advise. Thanks!

## 2024-03-13 NOTE — TELEPHONE ENCOUNTER
----- Message from Werner Boyle sent at 3/13/2024 12:48 PM CDT -----  Regarding: Paper work  Pt needs paperwork filled out for cataract surgery. Please call pt at [phone] when it is faxed over and done.

## 2024-03-13 NOTE — LETTER
2024    Jigar Acharya  [address] [address]             [facility]  [address] [place] [address]  [facility] [address]  Phone: [phone]  Fax: [phone] Patient: Jigar Acharya  : 1955  Referring Doctor: DR. BOBBY  PROCEDURE: CATARACT    Current Outpatient Medications   Medication Sig    albuterol (VENTOLIN HFA) 90 mcg/actuation inhaler Inhale 2 puffs into the lungs every 6 (six) hours as needed for Wheezing or Shortness of Breath. Rescue (Patient not taking: Reported on 2023)    ascorbic acid, vitamin C, (VITAMIN C) 1000 MG tablet Take 1,000 mg by mouth once daily.    aspirin 325 MG tablet Take 325 mg by mouth once daily.    clopidogreL (PLAVIX) 75 mg tablet Take 1 tablet (75 mg total) by mouth once daily.    fluocinolone (DERMA-SMOOTHE) 0.01 % external oil Apply topically 2 (two) times daily. Use on AA BID x 2 weeks    glimepiride (AMARYL) 2 MG tablet Take 2 mg by mouth 2 (two) times a day.    hydrocortisone 2.5 % cream Apply topically 2 (two) times daily. Use on AA BID x 10-14 days for 14 days    ketoconazole (NIZORAL) 2 % cream Apply topically 2 (two) times daily.    levothyroxine (UNITHROID) 137 MCG Tab tablet Take 137 mcg by mouth once daily.    losartan (COZAAR) 50 MG tablet TAKE ONE TABLET (50 MG) BY MOUTH ONCE DAILY    metFORMIN (GLUCOPHAGE-XR) 500 MG ER 24hr tablet TAKE TWO TABLETS BY MOUTH TWICE DAILY (Patient not taking: Reported on 2023)    metoprolol succinate (TOPROL-XL) 50 MG 24 hr tablet TAKE 1 AND 1/2 TABLETS (75 MG) BY MOUTH EVERY DAY    nitroGLYCERIN (NITROSTAT) 0.4 MG SL tablet Place 1 tablet (0.4 mg total) under the tongue every 5 (five) minutes as needed for Chest pain.    omega-3 fatty acids/fish oil (FISH OIL-OMEGA-3 FATTY ACIDS) 300-1,000 mg capsule Take 1 capsule by mouth once daily.    ONETOUCH DELICA LANCETS 30 gauge Misc TEST BID    ONETOUCH VERIO Strp TEST BID     ONETOUCH VERIO SYSTEM Misc TEST D    OZEMPIC 2 mg/dose (8 mg/3 mL) PnIj Inject 2 mg into the skin every 7 days.    potassium chloride (MICRO-K) 10 MEQ CpSR TAKE ONE CAPSULE (10 MEQ) BY MOUTH ONCE DAILY    simvastatin (ZOCOR) 20 MG tablet Take 1 tablet (20 mg total) by mouth every evening.    SYNJARDY XR 12.5-1,000 mg TBph Take by mouth.     No current facility-administered medications for this visit.       This patient has been assessed for risk factors for clearance of surgery with the following stipulations:    ___ No contraindications  ___ Recommendations for PLAVIX, HOLD X __ DAYS  ___ LOW RISK __ MODERATE RISK __ HIGH RISK       If you have any questions regarding the above, please contact my office at [phone].    Sincerely,

## 2024-03-14 ENCOUNTER — TELEPHONE (OUTPATIENT)
Dept: CARDIOLOGY | Facility: CLINIC | Age: 69
End: 2024-03-14
Payer: MEDICARE

## 2024-03-14 NOTE — TELEPHONE ENCOUNTER
----- Message from Paulette Kellie sent at 3/14/2024 11:44 AM CDT -----  Contact: Self  Patient is calling back in regards to the cataract clearance he was wanting on Dr Bueno to fill out and return, he is calling to see if that was completed yet. Can we please call pt back to advise the status at [phone]. Stated it is due before tomorrow if they don't receive this clearance they will make him reschedule the surgery. Thank You.     No

## 2024-03-15 ENCOUNTER — TELEPHONE (OUTPATIENT)
Dept: CARDIOLOGY | Facility: CLINIC | Age: 69
End: 2024-03-15
Payer: MEDICARE

## 2024-03-15 NOTE — TELEPHONE ENCOUNTER
----- Message from Reyes [place] sent at 3/15/2024 11:59 AM CDT -----  Contact: Dr Gottlieb's office  Type: Needs Medical Advice  Who Called:  Dr Gottlieb's office    Best Call Back Number: [phone]  Additional Information:  States they haven't rec'd pt clearance. States they only rec'd medication list. Would like last office notes or clearance form. Fax [phone]

## 2024-03-15 NOTE — TELEPHONE ENCOUNTER
Lvm with kota, we send our own clearance, we do not sign off on other doctors requests.  I will resend clearance.

## 2024-03-15 NOTE — TELEPHONE ENCOUNTER
----- Message from Isac Thompson sent at 3/15/2024  4:26 PM CDT -----  Regarding: return call  Contact: patient  Type:  Patient Returning Call    Who Called:MD Gottlieb office  Who Left Message for Patient:office nurse  Does the patient know what this is regarding?:office notes  Would the patient rather a call back or a response via [facility]? Please fax  Best Call Back Number:Fax#[phone]  Additional Information: # [phone] ext [phone]

## 2024-03-18 ENCOUNTER — TELEPHONE (OUTPATIENT)
Dept: CARDIOLOGY | Facility: CLINIC | Age: 69
End: 2024-03-18
Payer: MEDICARE

## 2024-03-18 NOTE — TELEPHONE ENCOUNTER
----- Message from Tammy Flores sent at 3/18/2024 10:57 AM CDT -----  Regarding: advise  Contact: patient  Type: Needs Medical Advice  Who Called:  patient  Symptoms (please be specific):  paperwork needed   How long has patient had these symptoms:    Pharmacy name and phone #:    Best Call Back Number: [phone]    Additional Information: for eye surgery call for further advisement they state the wrong paperwork is being sent call as soon as possible

## 2024-06-18 ENCOUNTER — OFFICE VISIT (OUTPATIENT)
Dept: CARDIOLOGY | Facility: CLINIC | Age: 69
End: 2024-06-18
Payer: MEDICARE

## 2024-06-18 VITALS
WEIGHT: 226 LBS | RESPIRATION RATE: 16 BRPM | OXYGEN SATURATION: 95 % | HEIGHT: 71 IN | BODY MASS INDEX: 31.64 KG/M2 | HEART RATE: 78 BPM | DIASTOLIC BLOOD PRESSURE: 64 MMHG | SYSTOLIC BLOOD PRESSURE: 116 MMHG

## 2024-06-18 DIAGNOSIS — E11.65 TYPE 2 DIABETES MELLITUS WITH HYPERGLYCEMIA, WITHOUT LONG-TERM CURRENT USE OF INSULIN: ICD-10-CM

## 2024-06-18 DIAGNOSIS — I10 ESSENTIAL HYPERTENSION: ICD-10-CM

## 2024-06-18 DIAGNOSIS — E66.01 SEVERE OBESITY: Primary | ICD-10-CM

## 2024-06-18 DIAGNOSIS — I25.10 CORONARY ARTERY DISEASE INVOLVING NATIVE CORONARY ARTERY OF NATIVE HEART WITHOUT ANGINA PECTORIS: ICD-10-CM

## 2024-06-18 DIAGNOSIS — E07.9 THYROID DYSFUNCTION: ICD-10-CM

## 2024-06-18 DIAGNOSIS — E78.5 DYSLIPIDEMIA: ICD-10-CM

## 2024-06-18 PROCEDURE — 99214 OFFICE O/P EST MOD 30 MIN: CPT | Mod: PBBFAC,PN | Performed by: INTERNAL MEDICINE

## 2024-06-18 PROCEDURE — 99213 OFFICE O/P EST LOW 20 MIN: CPT | Mod: S$PBB,,, | Performed by: INTERNAL MEDICINE

## 2024-06-18 PROCEDURE — 99999 PR PBB SHADOW E&M-EST. PATIENT-LVL IV: CPT | Mod: PBBFAC,,, | Performed by: INTERNAL MEDICINE

## 2024-06-18 RX ORDER — METOPROLOL SUCCINATE 50 MG/1
TABLET, EXTENDED RELEASE ORAL
Qty: 135 TABLET | Refills: 3 | Status: SHIPPED | OUTPATIENT
Start: 2024-06-18

## 2024-06-18 RX ORDER — CLOPIDOGREL BISULFATE 75 MG/1
75 TABLET ORAL DAILY
Qty: 90 TABLET | Refills: 3 | Status: SHIPPED | OUTPATIENT
Start: 2024-06-18 | End: 2025-06-18

## 2024-06-18 RX ORDER — LOSARTAN POTASSIUM 50 MG/1
50 TABLET ORAL DAILY
Qty: 90 TABLET | Refills: 3 | Status: SHIPPED | OUTPATIENT
Start: 2024-06-18 | End: 2025-06-18

## 2024-06-18 RX ORDER — SIMVASTATIN 20 MG/1
20 TABLET, FILM COATED ORAL NIGHTLY
Qty: 90 TABLET | Refills: 3 | Status: SHIPPED | OUTPATIENT
Start: 2024-06-18

## 2024-06-18 NOTE — ASSESSMENT & PLAN NOTE
Blood pressure is stable at 1 16/64 mm Hg continue on Zocor Toprol-XL 50 and losartan 50 mg a day maintain on low-salt low-fat

## 2024-06-18 NOTE — PROGRESS NOTES
Subjective:    Patient ID:  Jigar Acharya is a 69 y.o. male patient here for evaluation Follow-up      History of Present Illness:     81-year-old gentleman with history of arterial hypertension dyslipidemia type 2 diabetes seeking follow-up evaluation.  He was started on Ozempic for type 2 diabetes in his lost about 30 lb he feels good overall no occurrence of any arm neck or jaw pain noted no significant shortness of breath with normal physical activity some fatigue and tiredness is noted with end of the day or moderate activity.    No cough congestion no fevers no chills no blood in the stools no black stools        Review of patient's allergies indicates:   Allergen Reactions    Meperidine Other (See Comments)     Low Blood Pressure  Other reaction(s): Other (See Comments)  HYPOTENSION       Past Medical History:   Diagnosis Date    Coronary artery disease 2004    Diabetes mellitus, type 2     Diverticulitis     GERD (gastroesophageal reflux disease)     Hyperlipidemia     Hypertension     Hypothyroidism 2003    Kidney stones     Pancreatitis     Sleep apnea      Past Surgical History:   Procedure Laterality Date    COLONOSCOPY  12/18/2019    COLONOSCOPY N/A 12/18/2019    Procedure: COLONOSCOPY;  Surgeon: Esequiel Eastman MD;  Location: [facility] ENDO;  Service: Endoscopy;  Laterality: N/A;    Coronary Angiography  01/01/2004    CORONARY STENT PLACEMENT      HERNIA REPAIR      Inguinal    LEFT HEART CATHETERIZATION Left 3/29/2022    Procedure: Left heart cath;  Surgeon: Fransisco Bueno MD;  Location: [facility] CATH/EP LAB;  Service: Cardiology;  Laterality: Left;     Social History     Tobacco Use    Smoking status: Former    Smokeless tobacco: Never   Substance Use Topics    Alcohol use: No    Drug use: Never        Review of Systems:    As noted in HPI in addition      REVIEW OF SYSTEMS  CARDIOVASCULAR: No recent chest pain, palpitations, arm, neck, or jaw pain  RESPIRATORY: No recent fever, cough chills, SOB or  congestion  : No blood in the urine  GI: No Nausea, vomiting, constipation, diarrhea, blood, or reflux.  MUSCULOSKELETAL: No myalgias  NEURO: No lightheadedness or dizziness  EYES: No Double vision, blurry, vision or headache              Objective        Vitals:    06/18/24 1443   BP: 116/64   Pulse: 78   Resp: 16       LIPIDS - LAST 2   Lab Results   Component Value Date    CHOL 118 06/25/2021    HDL 32 (L) 06/25/2021    LDLCALC 60 06/25/2021    TRIG 187 (H) 06/25/2021    CHOLHDL 3.7 06/25/2021       CBC - LAST 2  Lab Results   Component Value Date    WBC 7.05 03/25/2022    WBC 10.85 03/03/2021    RBC 4.32 (L) 03/25/2022    RBC 3.98 (L) 03/03/2021    HGB 13.6 (L) 03/25/2022    HGB 12.5 (L) 03/03/2021    HCT 39.4 (L) 03/25/2022    HCT 36.8 (L) 03/03/2021    MCV 91 03/25/2022    MCV 93 03/03/2021    MCH 31.5 (H) 03/25/2022    MCH 31.4 (H) 03/03/2021    MCHC 34.5 03/25/2022    MCHC 34.0 03/03/2021    RDW 12.1 03/25/2022    RDW 12.4 03/03/2021     03/25/2022     (H) 03/03/2021    MPV 10.6 03/25/2022    MPV 9.9 03/03/2021    GRAN 4.2 03/25/2022    GRAN 58.7 03/25/2022    LYMPH 2.0 03/25/2022    LYMPH 28.7 03/25/2022    MONO 0.6 03/25/2022    MONO 8.1 03/25/2022    BASO 0.06 03/25/2022    BASO 0.02 03/03/2021    NRBC 0 03/25/2022    NRBC 0 03/03/2021       CHEMISTRY & LIVER FUNCTION - LAST 2  Lab Results   Component Value Date     (L) 03/25/2022     03/02/2021    K 3.9 03/25/2022    K 3.7 03/02/2021     03/25/2022     03/02/2021    CO2 22 (L) 03/25/2022    CO2 22 (L) 03/02/2021    ANIONGAP 9 03/25/2022    ANIONGAP 11 03/02/2021    BUN 13 03/25/2022    BUN 24 (H) 03/02/2021    CREATININE 1.1 03/25/2022    CREATININE 0.7 03/02/2021     (H) 03/25/2022     (H) 03/02/2021    CALCIUM 9.3 03/25/2022    CALCIUM 8.5 (L) 03/02/2021    MG 2.1 03/02/2021    MG 2.1 02/28/2021    ALBUMIN 3.1 (L) 02/28/2021    ALBUMIN 3.2 (L) 02/27/2021    PROT 7.2 02/28/2021    PROT 7.1 02/27/2021  "   ALKPHOS 52 (L) 02/28/2021    ALKPHOS 51 (L) 02/27/2021    ALT 22 02/28/2021    ALT 20 02/27/2021    AST 29 02/28/2021    AST 33 02/27/2021    BILITOT 0.9 02/28/2021    BILITOT 0.8 02/27/2021        CARDIAC PROFILE - LAST 2  Lab Results   Component Value Date    BNP 29 02/23/2021     02/23/2021     03/02/2021     03/01/2021    TROPONINI <0.030 02/23/2021        COAGULATION - LAST 2  No results found for: "LABPT", "INR", "APTT"    ENDOCRINE & PSA - LAST 2  Lab Results   Component Value Date    PROCAL 0.13 02/23/2021    PSA 1.14 01/25/2024    PSA 0.84 03/27/2018        ECHOCARDIOGRAM RESULTS  Results for orders placed during the hospital encounter of 03/17/22    Echo    Interpretation Summary  · The left ventricle is normal in size with mild concentric hypertrophy and normal systolic function.  · The estimated ejection fraction is 61%.  · Normal left ventricular diastolic function.  · Normal right ventricular size with normal right ventricular systolic function.  · Mild left atrial enlargement.  · Normal central venous pressure (3 mmHg).      CURRENT/PREVIOUS VISIT EKG  Results for orders placed or performed in visit on 04/08/22   IN OFFICE EKG 12-LEAD (to [place])    Collection Time: 04/08/22  9:41 AM    Narrative    Test Reason : E78.5,    Vent. Rate : 074 BPM     Atrial Rate : 074 BPM     P-R Int : 188 ms          QRS Dur : 114 ms      QT Int : 432 ms       P-R-T Axes : 068 014 041 degrees     QTc Int : 479 ms    Normal sinus rhythm  Incomplete right bundle branch block  Cannot rule out Anterior infarct ,age undetermined  Abnormal ECG  When compared with ECG of 29-MAR-2022 09:07,  No significant change was found  Confirmed by Flavia COLON, Flo GARCIA ([id number]) on 4/17/2022 5:23:39 PM    Referred By: CARSONERR   SELF           Confirmed By:Flo Alejandro MD     No valid procedures specified.   Results for orders placed during the hospital encounter of 03/17/22    Nuclear Stress - Cardiology " Interpreted    Interpretation Summary    Abnormal myocardial perfusion scan.    There is a moderate to severe intensity, moderate sized, mostly reversible perfusion abnormality with some fixed areas in the basal to mid inferolateral wall(s) in the typical distribution of the RCA territory.    The gated perfusion images showed an ejection fraction of 58% post stress. Normal ejection fraction is greater than 53%.    There is normal wall motion post stress.    LV cavity size is  and normal at stress.    The EKG portion of this study is abnormal but not diagnostic.    The patient reported no chest pain during the stress test.    Recommend further investigation if clinically indicated    No valid procedures specified.    PHYSICAL EXAM  CONSTITUTIONAL: Well built, well nourished in no apparent distress  NECK: no carotid bruit, no JVD  LUNGS: CTA  CHEST WALL: no tenderness  HEART: regular rate and rhythm, S1, S2 normal, no murmur, click, rub or gallop   ABDOMEN: soft, non-tender; bowel sounds normal; no masses,  no organomegaly  EXTREMITIES: Extremities normal, no edema, no calf tenderness noted  NEURO: AAO X 3    I HAVE REVIEWED :    The vital signs, nurses notes, and all the pertinent radiology and labs.        Current Outpatient Medications   Medication Instructions    albuterol (VENTOLIN HFA) 90 mcg/actuation inhaler 2 puffs, Inhalation, Every 6 hours PRN, Rescue    ascorbic acid (vitamin C) (VITAMIN C) 1,000 mg, Oral, Daily    aspirin 325 mg, Oral, Daily    clopidogreL (PLAVIX) 75 mg, Oral, Daily    fluocinolone (DERMA-SMOOTHE) 0.01 % external oil Topical (Top), 2 times daily, Use on AA BID x 2 weeks    glimepiride (AMARYL) 2 mg, Oral, 2 times daily    hydrocortisone 2.5 % cream Topical (Top), 2 times daily, Use on AA BID x 10-14 days    ketoconazole (NIZORAL) 2 % cream Topical (Top), 2 times daily    levothyroxine (UNITHROID) 150 mcg, Oral, Daily    losartan (COZAAR) 50 mg, Oral, Daily    metoprolol succinate  (TOPROL-XL) 50 MG 24 hr tablet TAKE 1 AND 1/2 TABLETS (75 MG) BY MOUTH EVERY DAY    nitroGLYCERIN (NITROSTAT) 0.4 mg, Sublingual, Every 5 min PRN    omega-3 fatty acids/fish oil (FISH OIL-OMEGA-3 FATTY ACIDS) 300-1,000 mg capsule 1 capsule, Oral, Daily    ONETOUCH DELICA LANCETS 30 gauge Misc TEST BID    ONETOUCH VERIO Strp TEST BID    ONETOUCH VERIO SYSTEM Misc TEST D    OZEMPIC 2 mg, Subcutaneous, Every 7 days    potassium chloride (MICRO-K) 10 MEQ CpSR TAKE ONE CAPSULE (10 MEQ) BY MOUTH ONCE DAILY    simvastatin (ZOCOR) 20 mg, Oral, Nightly    SYNJARDY XR 12.5-1,000 mg TBph Oral          Assessment & Plan     1. Severe obesity  Assessment & Plan:  BMI of 31.52 kilograms/meter squared he lost about 30 lb continue diet and exercise.  Ulcerative continue on Ozempic      2. Coronary artery disease involving native coronary artery of native heart without angina pectoris  Assessment & Plan:  Clinically stable no further episodes of angina noted.  Continue aspirin but lower it to 81 mg a day continue on Plavix 75 mg daily maintain on Toprol-XL 50 mg a day and simvastatin 20 mg nightly.      3. Essential hypertension  Assessment & Plan:  Blood pressure is stable at 1 16/64 mm Hg continue on Zocor Toprol-XL 50 and losartan 50 mg a day maintain on low-salt low-fat      4. Dyslipidemia  Assessment & Plan:  Maintain on simvastatin 20 mg nightly maintain low-fat low-cholesterol diet      5. Type 2 diabetes mellitus with hyperglycemia, without long-term current use of insulin  Assessment & Plan:  Blood sugars are better controlled continue on present regimen      Other orders  -     clopidogreL (PLAVIX) 75 mg tablet; Take 1 tablet (75 mg total) by mouth once daily.  Dispense: 90 tablet; Refill: 3  -     losartan (COZAAR) 50 MG tablet; Take 1 tablet (50 mg total) by mouth once daily.  Dispense: 90 tablet; Refill: 3  -     metoprolol succinate (TOPROL-XL) 50 MG 24 hr tablet; TAKE 1 AND 1/2 TABLETS (75 MG) BY MOUTH EVERY DAY   Dispense: 135 tablet; Refill: 3  -     simvastatin (ZOCOR) 20 MG tablet; Take 1 tablet (20 mg total) by mouth every evening.  Dispense: 90 tablet; Refill: 3          No follow-ups on file.

## 2024-06-18 NOTE — ASSESSMENT & PLAN NOTE
BMI of 31.52 kilograms/meter squared he lost about 30 lb continue diet and exercise.  Ulcerative continue on Ozempic

## 2024-06-18 NOTE — ASSESSMENT & PLAN NOTE
Clinically stable no further episodes of angina noted.  Continue aspirin but lower it to 81 mg a day continue on Plavix 75 mg daily maintain on Toprol-XL 50 mg a day and simvastatin 20 mg nightly.

## 2024-06-27 RX ORDER — METOPROLOL SUCCINATE 50 MG/1
TABLET, EXTENDED RELEASE ORAL
Qty: 135 TABLET | Refills: 2 | Status: SHIPPED | OUTPATIENT
Start: 2024-06-27

## 2024-09-23 RX ORDER — METOPROLOL SUCCINATE 50 MG/1
50 TABLET, EXTENDED RELEASE ORAL DAILY
Qty: 90 TABLET | Refills: 3 | Status: SHIPPED | OUTPATIENT
Start: 2024-09-23 | End: 2024-09-24 | Stop reason: SDUPTHER

## 2024-09-23 NOTE — TELEPHONE ENCOUNTER
----- Message from Tammy Flores sent at 9/23/2024 12:21 PM CDT -----  Regarding: advise  Contact: patient  Type: Needs Medical Advice  Who Called:  patient   Symptoms (please be specific):  metoprolol succinate (TOPROL-XL) 50 MG 24 hr tablet  How long has patient had these symptoms:    Pharmacy name and phone #:     [facility] - [place], [place] - [address] [address] [facility] [address]  [address] [address] [address]  Phone: [phone] Fax: [phone]        Best Call Back Number: [phone]    Additional Information: needs to know why the following prescription was denied

## 2024-09-24 RX ORDER — METOPROLOL SUCCINATE 50 MG/1
75 TABLET, EXTENDED RELEASE ORAL DAILY
Qty: 135 TABLET | Refills: 3 | Status: SHIPPED | OUTPATIENT
Start: 2024-09-24 | End: 2025-09-24

## 2024-09-24 NOTE — TELEPHONE ENCOUNTER
----- Message from Maricruz Cervantes sent at 9/24/2024 12:23 PM CDT -----  Contact: pt  Type:  Needs Medical Advice    Who Called: pt    Pharmacy name and phone #:      [facility] - [place], [place] - [address] [facility] [address]  [address]  [place] [address]  Phone: [phone] Fax: [phone]    Would the patient rather a call back or a response via [facility]?  Call back    Best Call Back Number: [phone]    Additional Information: metoprolol succinate (TOPROL-XL) 50 MG 24 hr tablet was sent to pharm and they told pt that script was different.     Pt usually take 1.5 (75.mg) pill 1x a day but new script says 1 (50 mg) pill 1x a day    Did you intend to change dosing?    Please call to advise    Thanks

## 2024-09-30 RX ORDER — POTASSIUM CHLORIDE 750 MG/1
10 CAPSULE, EXTENDED RELEASE ORAL
Qty: 90 CAPSULE | Refills: 3 | Status: SHIPPED | OUTPATIENT
Start: 2024-09-30

## 2024-12-13 ENCOUNTER — TELEPHONE (OUTPATIENT)
Dept: CARDIOLOGY | Facility: CLINIC | Age: 69
End: 2024-12-13
Payer: MEDICARE

## 2024-12-13 NOTE — LETTER
.  [facility]  [address]  [address]  [place] [address]  Phone: [phone]  Fax: [phone] Date: 2024    Patient: BRITTA MOLINA                     MRN#:[id number]  : 1955  Referring Physician: DR. LEWIS            Procedure: COLONOSCOPY  FAX: [phone]    Current Outpatient Medications   Medication Sig Dispense Refill    albuterol (VENTOLIN HFA) 90 mcg/actuation inhaler Inhale 2 puffs into the lungs every 6 (six) hours as needed for Wheezing or Shortness of Breath. Rescue (Patient not taking: Reported on 2023) 8 g 2    ascorbic acid, vitamin C, (VITAMIN C) 1000 MG tablet Take 1,000 mg by mouth once daily.      aspirin 325 MG tablet Take 325 mg by mouth once daily.      clopidogreL (PLAVIX) 75 mg tablet Take 1 tablet (75 mg total) by mouth once daily. 90 tablet 3    fluocinolone (DERMA-SMOOTHE) 0.01 % external oil Apply topically 2 (two) times daily. Use on AA BID x 2 weeks 118 mL 1    glimepiride (AMARYL) 2 MG tablet Take 2 mg by mouth 2 (two) times a day.      hydrocortisone 2.5 % cream Apply topically 2 (two) times daily. Use on AA BID x 10-14 days for 14 days 28 g 2    ketoconazole (NIZORAL) 2 % cream Apply topically 2 (two) times daily. 60 g 2    levothyroxine (UNITHROID) 137 MCG Tab tablet Take 150 mcg by mouth once daily.      losartan (COZAAR) 50 MG tablet Take 1 tablet (50 mg total) by mouth once daily. 90 tablet 3    metoprolol succinate (TOPROL-XL) 50 MG 24 hr tablet Take 1.5 tablets (75 mg total) by mouth once daily. 135 tablet 3    nitroGLYCERIN (NITROSTAT) 0.4 MG SL tablet Place 1 tablet (0.4 mg total) under the tongue every 5 (five) minutes as needed for Chest pain. 25 tablet 6    omega-3 fatty acids/fish oil (FISH OIL-OMEGA-3 FATTY ACIDS) 300-1,000 mg capsule Take 1 capsule by mouth once daily.      ONETOUCH DELICA LANCETS 30 gauge Misc TEST BID  12    ONETOUCH VERIO Strp TEST BID  12     ONETOUCH VERIO SYSTEM Misc TEST D  0    OZEMPIC 2 mg/dose (8 mg/3 mL) PnIj Inject 2 mg into the skin every 7 days.      potassium chloride (MICRO-K) 10 MEQ CpSR TAKE ONE CAPSULE BY MOUTH ONCE DAILY 90 capsule 3    simvastatin (ZOCOR) 20 MG tablet Take 1 tablet (20 mg total) by mouth every evening. 90 tablet 3    SYNJARDY XR 12.5-1,000 mg TBph Take by mouth.       No current facility-administered medications for this visit.       This patient has been assessed for risk factors for clearance of surgery with the following stipulations:    [x] No Contraindications.      [x] Recommendations for aspirin and CLOPIDOGREL: Hold X 7 DAYS.    [x] Patient is MODERATE RISK    [x] Cleared for surgery.    [] Not Cleared for surgery due to the following reasons:    If you have any questions regarding the above, please contact my office at [phone]    Clearing Clinician:             THANIA Ro  Department of Cardiology   [facility] [place] [place]

## 2024-12-13 NOTE — TELEPHONE ENCOUNTER
----- Message from Maricruz sent at 12/13/2024  1:18 PM CST -----  Contact: Dr Eastman office  Type:  Needs Medical Advice    Who Called: Dr Eastman office    Would the patient rather a call back or a response via [facility]? Call back    Best Call Back Number:     Additional Information: pt is having colonoscopy and they need clearance and instructions about pt plavix    Scheduled for 12/27      Fax     Please advise    Thanks

## 2024-12-23 ENCOUNTER — TELEPHONE (OUTPATIENT)
Dept: CARDIOLOGY | Facility: CLINIC | Age: 69
End: 2024-12-23
Payer: MEDICARE

## 2024-12-23 NOTE — TELEPHONE ENCOUNTER
----- Message from Dennis sent at 12/23/2024 11:57 AM CST -----  Contact: Romy  Type: Needs Medical Advice    Who Called:  Romy - Dr Sorto office    Best Call Back Number: [phone]    Additional Information: Romy is requesting a call back, she states that she faxed over a clearance letter for the patients colonoscopy on Friday, but has not heard back yet.

## 2024-12-23 NOTE — TELEPHONE ENCOUNTER
----- Message from Szuanne sent at 12/23/2024 11:39 AM CST -----  Regarding: advice  Type:  Needs Medical Advice    Who Called: wife kota Bright Call Back Number: [phone]       Additional Information: pt st that dr toribio has faxed over a sx clearance, so pt could get his colonoscopy.  He needs that filled out in fax back today.  please call to discuss.

## 2024-12-26 ENCOUNTER — TELEPHONE (OUTPATIENT)
Dept: CARDIOLOGY | Facility: CLINIC | Age: 69
End: 2024-12-26
Payer: MEDICARE

## 2024-12-26 NOTE — TELEPHONE ENCOUNTER
----- Message from Shobha sent at 12/26/2024 10:20 AM CST -----  Type:  Needs Medical Advice    Who Called: pt       Would the patient rather a call back or a response via [facility]? Call back       Best Call Back Number: [phone]       Additional Information: pt is trying to get clearance for colonoscopy, dr rivas sent clearance over for pt .  Please call back to advise. Thank you.

## 2024-12-30 ENCOUNTER — TELEPHONE (OUTPATIENT)
Dept: CARDIOLOGY | Facility: CLINIC | Age: 69
End: 2024-12-30
Payer: MEDICARE

## 2024-12-30 NOTE — TELEPHONE ENCOUNTER
----- Message from Karla sent at 12/30/2024 10:04 AM CST -----  Contact: Wife Haley  Pts wife haley is req you fax over the clearance for the patient to have his colonoscopy faxed over once again to Dr Eastman's of[facility] at fax # [phone] and call back if needed at [phone].  They are telling the patient that they still have not received the form so maybe call the office to verify the fax number before sending again and thank you so much

## 2024-12-30 NOTE — TELEPHONE ENCOUNTER
----- Message from Laura sent at 12/30/2024 10:20 AM CST -----  Contact: Mykel De Leon  Type: Needs Medical Advice         Who Called: Mykel De Leon   Best Call Back Number: [phone]     Additional Information: Requesting a call back regarding  Pt is wanting to give provider fax # for Dr Collins's so office can send paperwork.       Fax# [phone]    Please Advise- Thank you

## 2025-01-28 ENCOUNTER — OFFICE VISIT (OUTPATIENT)
Dept: CARDIOLOGY | Facility: CLINIC | Age: 70
End: 2025-01-28
Payer: MEDICARE

## 2025-01-28 VITALS
HEART RATE: 76 BPM | SYSTOLIC BLOOD PRESSURE: 130 MMHG | DIASTOLIC BLOOD PRESSURE: 70 MMHG | BODY MASS INDEX: 31.92 KG/M2 | WEIGHT: 228 LBS | RESPIRATION RATE: 17 BRPM | HEIGHT: 71 IN

## 2025-01-28 DIAGNOSIS — E78.5 DYSLIPIDEMIA: ICD-10-CM

## 2025-01-28 DIAGNOSIS — E66.01 SEVERE OBESITY: ICD-10-CM

## 2025-01-28 DIAGNOSIS — I10 ESSENTIAL HYPERTENSION: ICD-10-CM

## 2025-01-28 DIAGNOSIS — E08.65 DIABETES MELLITUS DUE TO UNDERLYING CONDITION WITH HYPERGLYCEMIA, WITHOUT LONG-TERM CURRENT USE OF INSULIN: ICD-10-CM

## 2025-01-28 DIAGNOSIS — I25.10 CORONARY ARTERY DISEASE INVOLVING NATIVE CORONARY ARTERY OF NATIVE HEART WITHOUT ANGINA PECTORIS: Primary | ICD-10-CM

## 2025-01-28 PROCEDURE — 99999 PR PBB SHADOW E&M-EST. PATIENT-LVL IV: CPT | Mod: PBBFAC,,, | Performed by: INTERNAL MEDICINE

## 2025-01-28 PROCEDURE — 99214 OFFICE O/P EST MOD 30 MIN: CPT | Mod: PBBFAC,PN | Performed by: INTERNAL MEDICINE

## 2025-01-28 NOTE — ASSESSMENT & PLAN NOTE
BMI is 31.80 kilograms/meter squared calorie restriction and continue on Ozempic and evaluate for Lexiscan perfusion imaging is normal then consider exercise program

## 2025-01-28 NOTE — PROGRESS NOTES
Subjective:    Patient ID:  Jigar Acharya is a 70 y.o. male patient here for evaluation Follow-up (Doing well)      History of Present Illness:   70-year-old gentleman with history of arterial hypertension dyslipidemia and thyroid dysfunction seeking follow-up evaluation.  He is doing fairly well from cardiac perspective no occurrence of any angina shortness of breath he had colonoscopy done more recently and no significant pathology was identified.  From GI perspective no active symptoms are noted  From cardiac perspective symptoms are also well controlled          Review of patient's allergies indicates:   Allergen Reactions    Meperidine Other (See Comments)     Low Blood Pressure  Other reaction(s): Other (See Comments)  HYPOTENSION       Past Medical History:   Diagnosis Date    Coronary artery disease 2004    Diabetes mellitus, type 2     Diverticulitis     GERD (gastroesophageal reflux disease)     Hyperlipidemia     Hypertension     Hypothyroidism 2003    Kidney stones     Pancreatitis     Sleep apnea      Past Surgical History:   Procedure Laterality Date    COLONOSCOPY  12/18/2019    COLONOSCOPY N/A 12/18/2019    Procedure: COLONOSCOPY;  Surgeon: Esequiel Eastman MD;  Location: [facility];  Service: Endoscopy;  Laterality: N/A;    COLONOSCOPY N/A 1/7/2025    Procedure: COLONOSCOPY;  Surgeon: Esequiel Eastman MD;  Location: [facility];  Service: Endoscopy;  Laterality: N/A;    Coronary Angiography  01/01/2004    CORONARY STENT PLACEMENT      HERNIA REPAIR      Inguinal    LEFT HEART CATHETERIZATION Left 3/29/2022    Procedure: Left heart cath;  Surgeon: Fransisco Bueno MD;  Location: [facility] CATH/EP LAB;  Service: Cardiology;  Laterality: Left;     Social History     Tobacco Use    Smoking status: Former    Smokeless tobacco: Never   Substance Use Topics    Alcohol use: No    Drug use: Never        Review of Systems:    As noted in HPI in addition      REVIEW OF SYSTEMS  CARDIOVASCULAR: No recent chest  pain, palpitations, arm, neck, or jaw pain  RESPIRATORY: No recent fever, cough chills, SOB or congestion  : No blood in the urine  GI: No Nausea, vomiting, constipation, diarrhea, blood, or reflux.  MUSCULOSKELETAL: No myalgias  NEURO: No lightheadedness or dizziness  EYES: No Double vision, blurry, vision or headache   Generalized fatigue and tiredness is noted           Objective        Vitals:    01/28/25 1046   BP: 130/70   Pulse: 76   Resp: 17       LIPIDS - LAST 2   Lab Results   Component Value Date    CHOL 118 06/25/2021    HDL 32 (L) 06/25/2021    LDLCALC 60 06/25/2021    TRIG 187 (H) 06/25/2021    CHOLHDL 3.7 06/25/2021       CBC - LAST 2  Lab Results   Component Value Date    WBC 7.05 03/25/2022    WBC 10.85 03/03/2021    RBC 4.32 (L) 03/25/2022    RBC 3.98 (L) 03/03/2021    HGB 13.6 (L) 03/25/2022    HGB 12.5 (L) 03/03/2021    HCT 39.4 (L) 03/25/2022    HCT 36.8 (L) 03/03/2021    MCV 91 03/25/2022    MCV 93 03/03/2021    MCH 31.5 (H) 03/25/2022    MCH 31.4 (H) 03/03/2021    MCHC 34.5 03/25/2022    MCHC 34.0 03/03/2021    RDW 12.1 03/25/2022    RDW 12.4 03/03/2021     03/25/2022     (H) 03/03/2021    MPV 10.6 03/25/2022    MPV 9.9 03/03/2021    GRAN 4.2 03/25/2022    GRAN 58.7 03/25/2022    LYMPH 2.0 03/25/2022    LYMPH 28.7 03/25/2022    MONO 0.6 03/25/2022    MONO 8.1 03/25/2022    BASO 0.06 03/25/2022    BASO 0.02 03/03/2021    NRBC 0 03/25/2022    NRBC 0 03/03/2021       CHEMISTRY & LIVER FUNCTION - LAST 2  Lab Results   Component Value Date     (L) 03/25/2022     03/02/2021    K 3.9 03/25/2022    K 3.7 03/02/2021     03/25/2022     03/02/2021    CO2 22 (L) 03/25/2022    CO2 22 (L) 03/02/2021    ANIONGAP 9 03/25/2022    ANIONGAP 11 03/02/2021    BUN 13 03/25/2022    BUN 24 (H) 03/02/2021    CREATININE 1.1 03/25/2022    CREATININE 0.7 03/02/2021     (H) 03/25/2022     (H) 03/02/2021    CALCIUM 9.3 03/25/2022    CALCIUM 8.5 (L) 03/02/2021    MG 2.1  "03/02/2021    MG 2.1 02/28/2021    ALBUMIN 3.1 (L) 02/28/2021    ALBUMIN 3.2 (L) 02/27/2021    PROT 7.2 02/28/2021    PROT 7.1 02/27/2021    ALKPHOS 52 (L) 02/28/2021    ALKPHOS 51 (L) 02/27/2021    ALT 22 02/28/2021    ALT 20 02/27/2021    AST 29 02/28/2021    AST 33 02/27/2021    BILITOT 0.9 02/28/2021    BILITOT 0.8 02/27/2021        CARDIAC PROFILE - LAST 2  Lab Results   Component Value Date    BNP 29 02/23/2021     02/23/2021     03/02/2021     03/01/2021    TROPONINI <0.030 02/23/2021        COAGULATION - LAST 2  No results found for: "LABPT", "INR", "APTT"    ENDOCRINE & PSA - LAST 2  Lab Results   Component Value Date    PROCAL 0.13 02/23/2021    PSA 1.14 01/25/2024    PSA 0.84 03/27/2018        ECHOCARDIOGRAM RESULTS  Results for orders placed during the hospital encounter of 03/17/22    Echo    Interpretation Summary  · The left ventricle is normal in size with mild concentric hypertrophy and normal systolic function.  · The estimated ejection fraction is 61%.  · Normal left ventricular diastolic function.  · Normal right ventricular size with normal right ventricular systolic function.  · Mild left atrial enlargement.  · Normal central venous pressure (3 mmHg).      CURRENT/PREVIOUS VISIT EKG  Results for orders placed or performed in visit on 04/08/22   IN OFFICE EKG 12-LEAD (to [facility])    Collection Time: 04/08/22  9:41 AM    Narrative    Test Reason : E78.5,    Vent. Rate : 074 BPM     Atrial Rate : 074 BPM     P-R Int : 188 ms          QRS Dur : 114 ms      QT Int : 432 ms       P-R-T Axes : 068 014 041 degrees     QTc Int : 479 ms    Normal sinus rhythm  Incomplete right bundle branch block  Cannot rule out Anterior infarct ,age undetermined  Abnormal ECG  When compared with ECG of 29-MAR-2022 09:07,  No significant change was found  Confirmed by Flavia COLON, Flo GARCIA ([id number]) on 4/17/2022 5:23:39 PM    Referred By: MILDRED   SELF           Confirmed By:Flo Alejandro " MD     No valid procedures specified.   Results for orders placed during the hospital encounter of 03/17/22    Nuclear Stress - Cardiology Interpreted    Interpretation Summary    Abnormal myocardial perfusion scan.    There is a moderate to severe intensity, moderate sized, mostly reversible perfusion abnormality with some fixed areas in the basal to mid inferolateral wall(s) in the typical distribution of the RCA territory.    The gated perfusion images showed an ejection fraction of 58% post stress. Normal ejection fraction is greater than 53%.    There is normal wall motion post stress.    LV cavity size is  and normal at stress.    The EKG portion of this study is abnormal but not diagnostic.    The patient reported no chest pain during the stress test.    Recommend further investigation if clinically indicated    No valid procedures specified.    PHYSICAL EXAM  CONSTITUTIONAL: Well built, well nourished in no apparent distress  NECK: no carotid bruit, no JVD  LUNGS: CTA  CHEST WALL: no tenderness  HEART: regular rate and rhythm, S1, S2 normal, no murmur, click, rub or gallop   ABDOMEN: soft, non-tender; bowel sounds normal; no masses,  no organomegaly  EXTREMITIES: Extremities normal, no edema, no calf tenderness noted  NEURO: AAO X 3    I HAVE REVIEWED :    The vital signs, nurses notes, and all the pertinent radiology and labs.    01/28/2025 EKG shows normal sinus rhythm incomplete right bundle branch block morphology nonspecific ST T wave changes    Current Outpatient Medications   Medication Instructions    ascorbic acid (vitamin C) (VITAMIN C) 1,000 mg, Daily    aspirin 325 mg, Daily    clopidogreL (PLAVIX) 75 mg, Oral, Daily    fluocinolone (DERMA-SMOOTHE) 0.01 % external oil Topical (Top), 2 times daily, Use on AA BID x 2 weeks    glimepiride (AMARYL) 2 mg, 2 times daily    hydrocortisone 2.5 % cream Topical (Top), 2 times daily, Use on AA BID x 10-14 days    ketoconazole (NIZORAL) 2 % cream Topical  (Top), 2 times daily    levothyroxine (UNITHROID) 150 mcg, Daily    losartan (COZAAR) 50 mg, Oral, Daily    metoprolol succinate (TOPROL-XL) 75 mg, Oral, Daily    nitroGLYCERIN (NITROSTAT) 0.4 mg, Sublingual, Every 5 min PRN    omega-3 fatty acids/fish oil (FISH OIL-OMEGA-3 FATTY ACIDS) 300-1,000 mg capsule 1 capsule, Daily    ONETOUCH DELICA LANCETS 30 gauge Misc TEST BID    ONETOUCH VERIO Strp TEST BID    ONETOUCH VERIO SYSTEM Misc TEST D    OZEMPIC 2 mg, Every 7 days    potassium chloride (MICRO-K) 10 MEQ CpSR 10 mEq, Oral    simvastatin (ZOCOR) 20 mg, Oral, Nightly    SYNJARDY XR 12.5-1,000 mg TBph Take by mouth.          Assessment & Plan     1. Coronary artery disease involving native coronary artery of native heart without angina pectoris  Overview:  Cardiac catheterization 03/29/2022   The 1st Mrg lesion was 95% stenosed with 0% stenosis post-intervention.  The pre-procedure left ventricular end diastolic pressure was 14.  The post-procedure left ventricular end diastolic pressure was 15.  The left ventricular systolic function was normal.  The left ventricular end diastolic pressure was normal.  The ejection fraction was calculated to be 65%.  The left ventricular ejection fraction was grossly normal.  A STENT ANDRES GABE 2.5 X 12 stent was successfully placed at 11 RUEL for 30 sec.  The estimated blood loss was <50 mL.  There was three vessel coronary artery disease.  The PCI was successful.     The procedure log was documented by No documenter listed and verified by Fransisco Bueno MD.     Date: 3/29/2022  Time: 12:15 PM       Assessment & Plan:  He is currently on dual antiplatelet therapy with full aspirin recommend to change it to 81 mg along with Plavix 75 mg daily.  Continue on Toprol-XL 50 mg a day and maintain on simvastatin 20 mg daily.  Recommend further cardiac evaluation has been nearly 3 years since revascularization and he has associated symptoms of fatigue and tiredness, assess for progression  of ischemic heart disease    Orders:  -     IN OFFICE EKG 12-LEAD (to [facility])  -     Nuclear Stress - Cardiology Interpreted; Future    2. Severe obesity  Assessment & Plan:  BMI is 31.80 kilograms/meter squared calorie restriction and continue on Ozempic and evaluate for Lexiscan perfusion imaging is normal then consider exercise program      3. Dyslipidemia  Assessment & Plan:  Continue on statin therapy with Zocor 20 mg daily the goal is to get his LDL cholesterol ideally below 60 repeat lipid levels and continue on omega-3 fish oil capsules      4. Essential hypertension  Assessment & Plan:  Blood pressure is stable at 1 30/70 mm Hg continue on losartan 50 mg daily, metoprolol succinate 75 mg daily maintain on low-salt diet    Orders:  -     IN OFFICE EKG 12-LEAD (to [facility])  -     Nuclear Stress - Cardiology Interpreted; Future    5. Diabetes mellitus due to underlying condition with hyperglycemia, without long-term current use of insulin  Assessment & Plan:  Currently on Ozempic as well as glimepiride follow-up with primary care    Orders:  -     IN OFFICE EKG 12-LEAD (to [facility])  -     Nuclear Stress - Cardiology Interpreted; Future          No follow-ups on file.

## 2025-01-28 NOTE — ASSESSMENT & PLAN NOTE
Continue on statin therapy with Zocor 20 mg daily the goal is to get his LDL cholesterol ideally below 60 repeat lipid levels and continue on omega-3 fish oil capsules

## 2025-01-28 NOTE — ASSESSMENT & PLAN NOTE
Blood pressure is stable at 1 30/70 mm Hg continue on losartan 50 mg daily, metoprolol succinate 75 mg daily maintain on low-salt diet

## 2025-01-28 NOTE — ASSESSMENT & PLAN NOTE
He is currently on dual antiplatelet therapy with full aspirin recommend to change it to 81 mg along with Plavix 75 mg daily.  Continue on Toprol-XL 50 mg a day and maintain on simvastatin 20 mg daily.  Recommend further cardiac evaluation has been nearly 3 years since revascularization and he has associated symptoms of fatigue and tiredness, assess for progression of ischemic heart disease

## 2025-02-07 ENCOUNTER — TELEPHONE (OUTPATIENT)
Dept: CARDIOLOGY | Facility: HOSPITAL | Age: 70
End: 2025-02-07

## 2025-02-07 NOTE — TELEPHONE ENCOUNTER
Left message on voicemail.    Patient advised, test will be at [facility] ([address]  Will need to register on the first floor at the main entrance.  Patient advised that arrival time is 07:30.  Patient advised that they may be here about 3.5-4 hours, and may want to bring something to occupy their time, as there will be periods of waiting.    Patient advised, may take his medications prior to testing if you need to.  Patient should HOLD Nitro**. Advised if medications are taken with food, please keep it light, like toast and juice.    Patient advised to avoid all caffeine 12 hours prior to testing.  This includes sodas, chocolate, decaf tea and coffee.    Will provide peanut butter crackers for a snack after stress test.  If patient would prefer something else, please bring a snack from home.    Wear comfortable clothing.  No lotions, oils, or powders to the upper chest area. May wear deodorant.    No metal jewelry, buttons, or zippers to the upper body.    Will send instructions via [employer] as well.

## 2025-02-10 ENCOUNTER — HOSPITAL ENCOUNTER (OUTPATIENT)
Dept: CARDIOLOGY | Facility: HOSPITAL | Age: 70
Discharge: HOME | End: 2025-02-10
Attending: INTERNAL MEDICINE
Payer: MEDICARE

## 2025-02-10 ENCOUNTER — HOSPITAL ENCOUNTER (OUTPATIENT)
Dept: RADIOLOGY | Facility: HOSPITAL | Age: 70
Discharge: HOME | End: 2025-02-10
Attending: INTERNAL MEDICINE
Payer: MEDICARE

## 2025-02-10 DIAGNOSIS — I25.10 CORONARY ARTERY DISEASE INVOLVING NATIVE CORONARY ARTERY OF NATIVE HEART WITHOUT ANGINA PECTORIS: ICD-10-CM

## 2025-02-10 DIAGNOSIS — E08.65 DIABETES MELLITUS DUE TO UNDERLYING CONDITION WITH HYPERGLYCEMIA, WITHOUT LONG-TERM CURRENT USE OF INSULIN: ICD-10-CM

## 2025-02-10 DIAGNOSIS — I10 ESSENTIAL HYPERTENSION: ICD-10-CM

## 2025-02-10 LAB
CV PHARM DOSE: 0.4 MG
CV STRESS BASE HR: 76 BPM
DIASTOLIC BLOOD PRESSURE: 72 MMHG
EJECTION FRACTION- HIGH: 65 %
END DIASTOLIC INDEX-HIGH: 153 ML/M2
END DIASTOLIC INDEX-LOW: 93 ML/M2
END SYSTOLIC INDEX-HIGH: 71 ML/M2
END SYSTOLIC INDEX-LOW: 31 ML/M2
NUC REST DIASTOLIC VOLUME INDEX: 79
NUC REST EJECTION FRACTION: 66
NUC REST SYSTOLIC VOLUME INDEX: 27
NUC STRESS DIASTOLIC VOLUME INDEX: 93
NUC STRESS EJECTION FRACTION: 69 %
NUC STRESS SYSTOLIC VOLUME INDEX: 29
OHS CV CPX 1 MINUTE RECOVERY HEART RATE: 104 BPM
OHS CV CPX 85 PERCENT MAX PREDICTED HEART RATE MALE: 128
OHS CV CPX MAX PREDICTED HEART RATE: 151
OHS CV CPX PATIENT IS FEMALE: 0
OHS CV CPX PATIENT IS MALE: 1
OHS CV CPX PEAK DIASTOLIC BLOOD PRESSURE: 72 MMHG
OHS CV CPX PEAK HEAR RATE: 104 BPM
OHS CV CPX PEAK RATE PRESSURE PRODUCT: NORMAL
OHS CV CPX PEAK SYSTOLIC BLOOD PRESSURE: 151 MMHG
OHS CV CPX PERCENT MAX PREDICTED HEART RATE ACHIEVED: 69
OHS CV CPX RATE PRESSURE PRODUCT PRESENTING: 9120
OHS CV INITIAL DOSE: 12.2 MCG/KG/MIN
OHS CV PEAK DOSE: 24.7 MCG/KG/MIN
RETIRED EF AND QEF - SEE NOTES: 53 %
SYSTOLIC BLOOD PRESSURE: 120 MMHG

## 2025-02-10 PROCEDURE — A9502 TC99M TETROFOSMIN: HCPCS | Performed by: INTERNAL MEDICINE

## 2025-02-10 PROCEDURE — 93018 CV STRESS TEST I&R ONLY: CPT | Mod: ,,, | Performed by: INTERNAL MEDICINE

## 2025-02-10 PROCEDURE — 78452 HT MUSCLE IMAGE SPECT MULT: CPT | Mod: 26,,, | Performed by: INTERNAL MEDICINE

## 2025-02-10 PROCEDURE — 93016 CV STRESS TEST SUPVJ ONLY: CPT | Mod: ,,, | Performed by: INTERNAL MEDICINE

## 2025-02-10 PROCEDURE — 63600175 PHARM REV CODE 636 W HCPCS: Performed by: INTERNAL MEDICINE

## 2025-02-10 PROCEDURE — 78452 HT MUSCLE IMAGE SPECT MULT: CPT

## 2025-02-10 RX ORDER — REGADENOSON 0.08 MG/ML
0.4 INJECTION, SOLUTION INTRAVENOUS
Status: COMPLETED | OUTPATIENT
Start: 2025-02-10 | End: 2025-02-10

## 2025-02-10 RX ADMIN — TETROFOSMIN 24.7 MILLICURIE: 1.38 INJECTION, POWDER, LYOPHILIZED, FOR SOLUTION INTRAVENOUS at 09:02

## 2025-02-10 RX ADMIN — REGADENOSON 0.4 MG: 0.08 INJECTION, SOLUTION INTRAVENOUS at 09:02

## 2025-02-10 RX ADMIN — TETROFOSMIN 12.2 MILLICURIE: 1.38 INJECTION, POWDER, LYOPHILIZED, FOR SOLUTION INTRAVENOUS at 07:02

## 2025-02-17 ENCOUNTER — RESULTS FOLLOW-UP (OUTPATIENT)
Dept: CARDIOLOGY | Facility: CLINIC | Age: 70
End: 2025-02-17

## 2025-02-17 ENCOUNTER — OFFICE VISIT (OUTPATIENT)
Dept: CARDIOLOGY | Facility: CLINIC | Age: 70
End: 2025-02-17
Payer: MEDICARE

## 2025-02-17 VITALS — OXYGEN SATURATION: 97 % | WEIGHT: 225.06 LBS | HEART RATE: 85 BPM | BODY MASS INDEX: 31.51 KG/M2 | HEIGHT: 71 IN

## 2025-02-17 DIAGNOSIS — E78.5 DYSLIPIDEMIA: ICD-10-CM

## 2025-02-17 DIAGNOSIS — I25.10 CORONARY ARTERY DISEASE INVOLVING NATIVE CORONARY ARTERY OF NATIVE HEART WITHOUT ANGINA PECTORIS: ICD-10-CM

## 2025-02-17 DIAGNOSIS — I10 ESSENTIAL HYPERTENSION: Primary | ICD-10-CM

## 2025-02-17 PROCEDURE — 99214 OFFICE O/P EST MOD 30 MIN: CPT | Mod: PBBFAC,PN

## 2025-02-17 NOTE — ASSESSMENT & PLAN NOTE
Continue on aspirin and Plavix daily.  Continue on Toprol-XL 75 mg daily and simvastatin 20 mg nightly.  Most recent nuclear stress test negative for any reversible ischemia.

## 2025-02-17 NOTE — ASSESSMENT & PLAN NOTE
Due for a repeat lipid panel.  He does have his labs done at [facility] - he will send over a copy of the results.  For now, continue on simvastatin 20 mg every evening and Omega 3 fatty fish oil supplements.  Encouraged heart healthy low-fat low-cholesterol diet and exercise as tolerated.   They have to talk to  at the current facility or director at current facility to facilitate the transfer to another subacute facility

## 2025-02-17 NOTE — ASSESSMENT & PLAN NOTE
BP stable today in clinic at 120/60. Continue current regimen of losartan 50 mg daily and Toprol XL 75 mg daily.   Encouraged low-sodium diet.

## 2025-02-17 NOTE — PROGRESS NOTES
Subjective:    Patient ID:  Jigar Acharya is a 70 y.o. male who presents for follow-up.   Chief Complaint   Patient presents with    Hypertension    Results    Follow-up       HPI:  Jigar Acharya is here for follow-up visit. Denies chest pain or shortness of breath. Denies recent fever cough chills or congestion. Denies blood in the urine or blood in the stool.  Denies myalgias. Denies orthopnea or peripheral edema. Denies nausea vomiting or dyspepsia. No recent arm neck or jaw pain. No lightheadedness or dizziness.   He comes into the clinic today to discuss the results of his recent nuclear stress test.    Review of patient's allergies indicates:   Allergen Reactions    Meperidine Other (See Comments)     Low Blood Pressure  Other reaction(s): Other (See Comments)  HYPOTENSION       Past Medical History:   Diagnosis Date    Coronary artery disease 2004    Diabetes mellitus, type 2     Diverticulitis     GERD (gastroesophageal reflux disease)     Hyperlipidemia     Hypertension     Hypothyroidism 2003    Kidney stones     Pancreatitis     Sleep apnea      Past Surgical History:   Procedure Laterality Date    COLONOSCOPY  12/18/2019    COLONOSCOPY N/A 12/18/2019    Procedure: COLONOSCOPY;  Surgeon: Esequiel Eastman MD;  Location: [facility];  Service: Endoscopy;  Laterality: N/A;    COLONOSCOPY N/A 1/7/2025    Procedure: COLONOSCOPY;  Surgeon: Esequiel Eastman MD;  Location: [facility];  Service: Endoscopy;  Laterality: N/A;    Coronary Angiography  01/01/2004    CORONARY STENT PLACEMENT      HERNIA REPAIR      Inguinal    LEFT HEART CATHETERIZATION Left 3/29/2022    Procedure: Left heart cath;  Surgeon: Fransisco Bueno MD;  Location: [facility] CATH/EP LAB;  Service: Cardiology;  Laterality: Left;     Social History[1]  Family History   Problem Relation Name Age of Onset    No Known Problems Mother      No Known Problems Father      Colon cancer Paternal Aunt      Heart disease Maternal Grandfather           Review of Systems:   Constitution: Negative for diaphoresis and fever.   HEENT: Negative for nosebleeds.    Cardiovascular: Negative for chest pain       No dyspnea on exertion       No leg swelling        No palpitations  Respiratory: Negative for shortness of breath and wheezing.    Hematologic/Lymphatic: Negative for bleeding problem. Does not bruise/bleed easily.   Skin: Negative for color change and rash.   Musculoskeletal: Negative for falls and myalgias.   Gastrointestinal: Negative for hematemesis and hematochezia.   Genitourinary: Negative for hematuria.   Neurological: Negative for dizziness and light-headedness.   Psychiatric/Behavioral: Negative for altered mental status and memory loss.          Objective:        Vitals:    02/17/25 1418   BP: (P) 120/60   Pulse: 85       Lab Results   Component Value Date    WBC 7.05 03/25/2022    HGB 13.6 (L) 03/25/2022    HCT 39.4 (L) 03/25/2022     03/25/2022    CHOL 118 06/25/2021    TRIG 187 (H) 06/25/2021    HDL 32 (L) 06/25/2021    ALT 22 02/28/2021    AST 29 02/28/2021     (L) 03/25/2022    K 3.9 03/25/2022     03/25/2022    CREATININE 1.1 03/25/2022    BUN 13 03/25/2022    CO2 22 (L) 03/25/2022    PSA 1.14 01/25/2024        ECHOCARDIOGRAM RESULTS  Results for orders placed during the hospital encounter of 03/17/22    Echo    Interpretation Summary  · The left ventricle is normal in size with mild concentric hypertrophy and normal systolic function.  · The estimated ejection fraction is 61%.  · Normal left ventricular diastolic function.  · Normal right ventricular size with normal right ventricular systolic function.  · Mild left atrial enlargement.  · Normal central venous pressure (3 mmHg).        CURRENT/PREVIOUS VISIT EKG  Results for orders placed or performed in visit on 01/28/25   IN OFFICE EKG 12-LEAD (to [place])    Collection Time: 01/28/25 10:56 AM   Result Value Ref Range    QRS Duration 112 ms    [facility] QTC Calculation 470 ms     Narrative    Test Reason : I25.10,I10,E08.65,    Vent. Rate :  77 BPM     Atrial Rate :  77 BPM     P-R Int : 182 ms          QRS Dur : 112 ms      QT Int : 416 ms       P-R-T Axes :  58  -8  26 degrees    QTcB Int : 470 ms    Normal sinus rhythm  Incomplete right bundle branch block  Borderline Abnormal ECG  When compared with ECG of 08-Apr-2022 09:41,  Minimal criteria for Anterior infarct are no longer Present  Confirmed by Fransisco Bueno ([id number]) on 2/13/2025 6:18:47 PM    Referred By:            Confirmed By: Fransisco Bueno     No valid procedures specified.   Results for orders placed during the hospital encounter of 02/10/25    Nuclear Stress - Cardiology Interpreted    Interpretation Summary    Normal myocardial perfusion scan. There is no evidence of myocardial ischemia or infarction.    There is a mild intensity fixed perfusion abnormality in the anteroapical wall of the left ventricle, secondary to soft tissue attenuation.    The gated perfusion images showed an ejection fraction of 66% at rest. The gated perfusion images showed an ejection fraction of 69% post stress. Normal ejection fraction is greater than 53%.    There is normal wall motion at rest and post-stress.    LV cavity size is normal at rest and normal at post-stress.    The ECG portion of the study is negative for ischemia.    The patient reported chest pain during the stress test.    There were no arrhythmias during stress.      Physical Exam:  CONSTITUTIONAL: No fever, no chills  HEENT: Normocephalic, atraumatic,pupils reactive to light                 NECK:  No JVD no carotid bruit  CVS: S1S2+, RRR, no murmurs,   LUNGS: Clear  ABDOMEN: Soft, NT, BS+  EXTREMITIES: No cyanosis, edema  : No sanchez catheter  NEURO: AAO X 3  PSY: Normal affect      Medication List with Changes/Refills   Current Medications    ASCORBIC ACID, VITAMIN C, (VITAMIN C) 1000 MG TABLET    Take 1,000 mg by mouth once daily.    ASPIRIN 325 MG TABLET    Take 325 mg by  mouth once daily.    CLOPIDOGREL (PLAVIX) 75 MG TABLET    Take 1 tablet (75 mg total) by mouth once daily.    FLUOCINOLONE (DERMA-SMOOTHE) 0.01 % EXTERNAL OIL    Apply topically 2 (two) times daily. Use on AA BID x 2 weeks    GLIMEPIRIDE (AMARYL) 2 MG TABLET    Take 2 mg by mouth 2 (two) times a day.    HYDROCORTISONE 2.5 % CREAM    Apply topically 2 (two) times daily. Use on AA BID x 10-14 days for 14 days    KETOCONAZOLE (NIZORAL) 2 % CREAM    Apply topically 2 (two) times daily.    LEVOTHYROXINE (UNITHROID) 137 MCG TAB TABLET    Take 150 mcg by mouth once daily.    LOSARTAN (COZAAR) 50 MG TABLET    Take 1 tablet (50 mg total) by mouth once daily.    METOPROLOL SUCCINATE (TOPROL-XL) 50 MG 24 HR TABLET    Take 1.5 tablets (75 mg total) by mouth once daily.    NITROGLYCERIN (NITROSTAT) 0.4 MG SL TABLET    Place 1 tablet (0.4 mg total) under the tongue every 5 (five) minutes as needed for Chest pain.    OMEGA-3 FATTY ACIDS/FISH OIL (FISH OIL-OMEGA-3 FATTY ACIDS) 300-1,000 MG CAPSULE    Take 1 capsule by mouth once daily.    ONETOUCH DELICA LANCETS 30 GAUGE MISC    TEST BID    ONETOUCH VERIO STRP    TEST BID    ONETOUCH VERIO SYSTEM MISC    TEST D    OZEMPIC 2 MG/DOSE (8 MG/3 ML) PNIJ    Inject 2 mg into the skin every 7 days.    POTASSIUM CHLORIDE (MICRO-K) 10 MEQ CPSR    TAKE ONE CAPSULE BY MOUTH ONCE DAILY    SIMVASTATIN (ZOCOR) 20 MG TABLET    Take 1 tablet (20 mg total) by mouth every evening.    SYNJARDY XR 12.5-1,000 MG TBPH    Take by mouth.             Assessment:       1. Essential hypertension    2. Dyslipidemia    3. Coronary artery disease involving native coronary artery of native heart without angina pectoris         Plan:     Problem List Items Addressed This Visit          Cardiac/Vascular    Coronary artery disease involving native coronary artery of native heart without angina pectoris    Overview   Cardiac catheterization 03/29/2022   The 1st Mrg lesion was 95% stenosed with 0% stenosis  post-intervention.  The pre-procedure left ventricular end diastolic pressure was 14.  The post-procedure left ventricular end diastolic pressure was 15.  The left ventricular systolic function was normal.  The left ventricular end diastolic pressure was normal.  The ejection fraction was calculated to be 65%.  The left ventricular ejection fraction was grossly normal.  A STENT ANDRES GABE 2.5 X 12 stent was successfully placed at 11 RUEL for 30 sec.  The estimated blood loss was <50 mL.  There was three vessel coronary artery disease.  The PCI was successful.     The procedure log was documented by No documenter listed and verified by Fransisco Bueno MD.     Date: 3/29/2022  Time: 12:15 PM            Current Assessment & Plan   Continue on aspirin and Plavix daily.  Continue on Toprol-XL 75 mg daily and simvastatin 20 mg nightly.  Most recent nuclear stress test negative for any reversible ischemia.         Essential hypertension - Primary    Current Assessment & Plan   BP stable today in clinic at 120/60. Continue current regimen of losartan 50 mg daily and Toprol XL 75 mg daily.   Encouraged low-sodium diet.         Dyslipidemia    Current Assessment & Plan   Due for a repeat lipid panel.  He does have his labs done at [facility] - he will send over a copy of the results.  For now, continue on simvastatin 20 mg every evening and Omega 3 fatty fish oil supplements.  Encouraged heart healthy low-fat low-cholesterol diet and exercise as tolerated.            Follow up in about 6 months (around 8/17/2025).           [1]   Social History  Tobacco Use    Smoking status: Former    Smokeless tobacco: Never   Substance Use Topics    Alcohol use: No    Drug use: Never

## 2025-02-19 ENCOUNTER — TELEPHONE (OUTPATIENT)
Dept: CARDIOLOGY | Facility: CLINIC | Age: 70
End: 2025-02-19
Payer: MEDICARE

## 2025-02-19 NOTE — TELEPHONE ENCOUNTER
----- Message from Shayla Pelayo NP sent at 2/17/2025  1:05 PM CST -----  Stress test is negative for reversible ischemia. This means there does not appear to be any blockages that are causing a problem (meaning over 70% blocked). According to this test, you are getting   enough blood flow to the coronary arteries (the arteries of the heart). The test is about 90% accurate. However if you are having symptoms, we can discuss at follow up unless symptoms are severe in   which case please go to the nearest ER. If you are doing   well and would like to move your next appointment to a later date, please contact our office.   ----- Message -----  From: Fransisco Bueno MD  Sent: 2/10/2025  12:16 PM CST  To: Fransisco Bueno MD

## 2025-02-19 NOTE — TELEPHONE ENCOUNTER
Sw the pt about his question on having labs forward to [facility] from a out of network provider he was informed that if his provider is not in network he will have to install an account for [facility]  to review or print labs outs  he also was informed another option to do labs for [facility] is in house so his providers could review quicker  he stated he understand and will make a portal for the out side lab for review

## 2025-02-19 NOTE — TELEPHONE ENCOUNTER
----- Message from Bella sent at 2/19/2025  9:52 AM CST -----  Regarding: return call  Contact: Haley smith  Type:  Patient Returning CallWho Called:  Haley smithWhmegan Left Message for Patient:  Shayla Pelayo NPDoes the patient know what this is regarding?:  resultsBest Call Back Number:  [phone] Information:  Please call spouse to advise.  Thanks!

## 2025-02-20 ENCOUNTER — TELEPHONE (OUTPATIENT)
Dept: CARDIOLOGY | Facility: CLINIC | Age: 70
End: 2025-02-20
Payer: MEDICARE

## 2025-02-20 NOTE — TELEPHONE ENCOUNTER
----- Message from Suzanne sent at 2/20/2025 11:06 AM CST -----  Regarding: rt call  Type:  Patient Returning CallWho Called:ptWho Left Message for Patient: Klarissa LEVINE the patient know what this is regarding?:yesBest Call Back Number:[phone] Information:

## 2025-06-25 RX ORDER — LOSARTAN POTASSIUM 50 MG/1
50 TABLET ORAL DAILY
Qty: 90 TABLET | Refills: 3 | Status: SHIPPED | OUTPATIENT
Start: 2025-06-25 | End: 2026-06-25

## 2025-07-07 RX ORDER — CLOPIDOGREL BISULFATE 75 MG/1
75 TABLET ORAL DAILY
Qty: 90 TABLET | Refills: 3 | Status: SHIPPED | OUTPATIENT
Start: 2025-07-07 | End: 2026-07-07

## 2025-07-14 ENCOUNTER — OFFICE VISIT (OUTPATIENT)
Dept: DERMATOLOGY | Facility: CLINIC | Age: 70
End: 2025-07-14
Payer: MEDICARE

## 2025-07-14 VITALS — HEIGHT: 71 IN | BODY MASS INDEX: 30.8 KG/M2 | WEIGHT: 220 LBS

## 2025-07-14 DIAGNOSIS — L73.8 SEBACEOUS HYPERPLASIA OF FACE: ICD-10-CM

## 2025-07-14 DIAGNOSIS — D22.9 MULTIPLE BENIGN NEVI: ICD-10-CM

## 2025-07-14 DIAGNOSIS — L57.0 ACTINIC KERATOSES: Primary | ICD-10-CM

## 2025-07-14 DIAGNOSIS — L82.1 SEBORRHEIC KERATOSES: ICD-10-CM

## 2025-07-14 DIAGNOSIS — Z12.83 SKIN CANCER SCREENING: ICD-10-CM

## 2025-07-14 DIAGNOSIS — L57.8 ACTINIC SKIN DAMAGE: ICD-10-CM

## 2025-07-14 PROCEDURE — 17000 DESTRUCT PREMALG LESION: CPT | Mod: S$GLB,,, | Performed by: DERMATOLOGY

## 2025-07-14 PROCEDURE — 99203 OFFICE O/P NEW LOW 30 MIN: CPT | Mod: 25,S$GLB,, | Performed by: DERMATOLOGY

## 2025-07-14 NOTE — PATIENT INSTRUCTIONS

## 2025-07-14 NOTE — PROGRESS NOTES
Subjective:      Patient ID:  Jigar Acharya is a 70 y.o. male who presents for   Chief Complaint   Patient presents with    Spot     face    Skin Check     tbsc     New patient    Here today for a skin check-tbsc  C/o spot check on face- right ear, chin and neck    Has no hx of NMSC  Has no fhx of MM    Current Outpatient Medications:   ·  ascorbic acid, vitamin C, (VITAMIN C) 1000 MG tablet, Take 1,000 mg by mouth once daily., Disp: , Rfl:   ·  aspirin 325 MG tablet, Take 325 mg by mouth once daily., Disp: , Rfl:   ·  clopidogreL (PLAVIX) 75 mg tablet, Take 1 tablet (75 mg total) by mouth once daily., Disp: 90 tablet, Rfl: 3  ·  fluocinolone (DERMA-SMOOTHE) 0.01 % external oil, Apply topically 2 (two) times daily. Use on AA BID x 2 weeks, Disp: 118 mL, Rfl: 1  ·  glimepiride (AMARYL) 2 MG tablet, Take 2 mg by mouth 2 (two) times a day., Disp: , Rfl:   ·  ketoconazole (NIZORAL) 2 % cream, Apply topically 2 (two) times daily., Disp: 60 g, Rfl: 2  ·  levothyroxine (UNITHROID) 137 MCG Tab tablet, Take 150 mcg by mouth once daily., Disp: , Rfl:   ·  losartan (COZAAR) 50 MG tablet, Take 1 tablet (50 mg total) by mouth once daily., Disp: 90 tablet, Rfl: 3  ·  metoprolol succinate (TOPROL-XL) 50 MG 24 hr tablet, Take 1.5 tablets (75 mg total) by mouth once daily., Disp: 135 tablet, Rfl: 3  ·  omega-3 fatty acids/fish oil (FISH OIL-OMEGA-3 FATTY ACIDS) 300-1,000 mg capsule, Take 1 capsule by mouth once daily., Disp: , Rfl:   ·  ONETOUCH DELICA LANCETS 30 gauge Misc, TEST BID, Disp: , Rfl: 12  ·  ONETOUCH VERIO Strp, TEST BID, Disp: , Rfl: 12  ·  ONETOUCH VERIO SYSTEM Misc, TEST D, Disp: , Rfl: 0  ·  OZEMPIC 2 mg/dose (8 mg/3 mL) PnIj, Inject 2 mg into the skin every 7 days., Disp: , Rfl:   ·  potassium chloride (MICRO-K) 10 MEQ CpSR, TAKE ONE CAPSULE BY MOUTH ONCE DAILY, Disp: 90 capsule, Rfl: 3  ·  simvastatin (ZOCOR) 20 MG tablet, Take 1 tablet (20 mg total) by mouth every evening., Disp: 90 tablet, Rfl: 3  ·   SYNJARDY XR 12.5-1,000 mg TBph, Take by mouth., Disp: , Rfl:   ·  hydrocortisone 2.5 % cream, Apply topically 2 (two) times daily. Use on AA BID x 10-14 days for 14 days, Disp: 28 g, Rfl: 2  ·  nitroGLYCERIN (NITROSTAT) 0.4 MG SL tablet, Place 1 tablet (0.4 mg total) under the tongue every 5 (five) minutes as needed for Chest pain., Disp: 25 tablet, Rfl: 6          Review of Systems   Constitutional:  Negative for fever, chills and fatigue.   Skin:  Positive for wears hat. Negative for itching, rash, dry skin, daily sunscreen use and activity-related sunscreen use.   Hematologic/Lymphatic: Bruises/bleeds easily.       Objective:   Physical Exam   Constitutional: He appears well-developed and well-nourished. No distress.   Neurological: He is alert and oriented to person, place, and time. He is not disoriented.   Psychiatric: He has a normal mood and affect.   Skin:   Areas Examined (abnormalities noted in diagram):   Scalp / Hair Palpated and Inspected  Head / Face Inspection Performed  Neck Inspection Performed  Chest / Axilla Inspection Performed  Abdomen Inspection Performed  Genitals / Buttocks / Groin Inspection Performed  Back Inspection Performed  RUE Inspected  LUE Inspection Performed  RLE Inspected  LLE Inspection Performed  Nails and Digits Inspection Performed                 Diagram Legend     Erythematous scaling macule/papule c/w actinic keratosis       Vascular papule c/w angioma      Pigmented verrucoid papule/plaque c/w seborrheic keratosis      Yellow umbilicated papule c/w sebaceous hyperplasia      Irregularly shaped tan macule c/w lentigo     1-2 mm smooth white papules consistent with Milia      Movable subcutaneous cyst with punctum c/w epidermal inclusion cyst      Subcutaneous movable cyst c/w pilar cyst      Firm pink to brown papule c/w dermatofibroma      Pedunculated fleshy papule(s) c/w skin tag(s)      Evenly pigmented macule c/w junctional nevus     Mildly variegated pigmented,  slightly irregular-bordered macule c/w mildly atypical nevus      Flesh colored to evenly pigmented papule c/w intradermal nevus       Pink pearly papule/plaque c/w basal cell carcinoma      Erythematous hyperkeratotic cursted plaque c/w SCC      Surgical scar with no sign of skin cancer recurrence      Open and closed comedones      Inflammatory papules and pustules      Verrucoid papule consistent consistent with wart     Erythematous eczematous patches and plaques     Dystrophic onycholytic nail with subungual debris c/w onychomycosis     Umbilicated papule    Erythematous-base heme-crusted tan verrucoid plaque consistent with inflamed seborrheic keratosis     Erythematous Silvery Scaling Plaque c/w Psoriasis     See annotation      Assessment / Plan:        Actinic keratoses  Cryosurgery Procedure Note    Verbal consent from the patient is obtained and the patient is aware of the precancerous quality and need for treatment of these lesions. Liquid nitrogen cryosurgery is applied to the 1 actinic keratoses, as detailed in the physical exam, to produce a freeze injury. The patient is aware that blisters may form and is instructed on wound care with gentle cleansing and use of vaseline ointment to keep moist until healed. The patient is supplied a handout on cryosurgery and is instructed to call if lesions do not completely resolve.    Seborrheic keratoses  These are benign inherited growths without a malignant potential. Reassurance given to patient. No treatment is necessary.     Sebaceous hyperplasia of face  This is a common condition representing benign enlargement of the sebaceous lobule. It typically occurs in adulthood. Reassurance given to patient.     Multiple benign nevi  Careful dermoscopy evaluation of nevi performed with none identified as needing biopsy today  Monitor for new mole or moles that are becoming bigger, darker, irritated, or developing irregular borders.     Skin cancer screening  Total  body skin examination performed today including at least 12 points as noted in physical examination. No lesions suspicious for malignancy noted.    Actinic skin damage  Patient instructed in importance in daily broad spectrum sun protection of at least spf 30. Mineral sunscreen ingredients preferred (Zinc +/- Titanium) and can be found OTC.   Patient encouraged to wear hat for all outdoor exposure.   Also discussed sun avoidance and use of protective clothing.             No follow-ups on file.

## 2025-07-15 ENCOUNTER — TELEPHONE (OUTPATIENT)
Dept: DERMATOLOGY | Facility: CLINIC | Age: 70
End: 2025-07-15
Payer: MEDICARE

## 2025-07-15 RX ORDER — AMMONIUM LACTATE 12 G/100G
LOTION TOPICAL
Qty: 396 G | Refills: 11 | Status: SHIPPED | OUTPATIENT
Start: 2025-07-15

## 2025-07-15 NOTE — TELEPHONE ENCOUNTER
Patients wife called in and stated that you told her you would send something in for his dry skin. Wants it to go to [facility], please advise.

## 2025-07-15 NOTE — TELEPHONE ENCOUNTER
Copied from CRM #[id number]. Topic: General Inquiry - Patient Advice  >> Jul 15, 2025  9:47 AM Isac wrote:  Type:  Patient Returning Call    Who Called:Wife: Haley  Who Left Message for Patient:nurse  Does the patient know what this is regarding?:medication not ordered for infected area  Would the patient rather a call back or a response via [facility]? Please call  Best Call Back Number:[phone]  Additional Information:

## 2025-08-15 ENCOUNTER — OFFICE VISIT (OUTPATIENT)
Dept: UROLOGY | Facility: CLINIC | Age: 70
End: 2025-08-15
Payer: MEDICARE

## 2025-08-15 DIAGNOSIS — R39.15 URINARY URGENCY: Primary | ICD-10-CM

## 2025-08-15 DIAGNOSIS — N20.0 KIDNEY STONES: ICD-10-CM

## 2025-08-15 LAB
BILIRUBIN, UA POC OHS: NEGATIVE
BLOOD, UA POC OHS: NEGATIVE
CLARITY, UA POC OHS: CLEAR
COLOR, UA POC OHS: YELLOW
GLUCOSE, UA POC OHS: >=1000
KETONES, UA POC OHS: NEGATIVE
LEUKOCYTES, UA POC OHS: NEGATIVE
NITRITE, UA POC OHS: NEGATIVE
PH, UA POC OHS: 5.5
POC RESIDUAL URINE VOLUME: 8 ML (ref 0–100)
PROTEIN, UA POC OHS: NEGATIVE
SPECIFIC GRAVITY, UA POC OHS: 1.01
UROBILINOGEN, UA POC OHS: 0.2

## 2025-08-15 PROCEDURE — 99999 PR PBB SHADOW E&M-EST. PATIENT-LVL III: CPT | Mod: PBBFAC,,, | Performed by: NURSE PRACTITIONER

## 2025-08-15 PROCEDURE — 99213 OFFICE O/P EST LOW 20 MIN: CPT | Mod: PBBFAC,PO | Performed by: NURSE PRACTITIONER

## 2025-08-18 RX ORDER — SIMVASTATIN 20 MG/1
20 TABLET, FILM COATED ORAL NIGHTLY
Qty: 90 TABLET | Refills: 3 | Status: SHIPPED | OUTPATIENT
Start: 2025-08-18

## 2025-08-19 ENCOUNTER — HOSPITAL ENCOUNTER (OUTPATIENT)
Dept: RADIOLOGY | Facility: HOSPITAL | Age: 70
Discharge: HOME | End: 2025-08-19
Attending: NURSE PRACTITIONER
Payer: MEDICARE

## 2025-08-19 DIAGNOSIS — N20.0 KIDNEY STONES: ICD-10-CM

## 2025-08-19 PROCEDURE — 76770 US EXAM ABDO BACK WALL COMP: CPT | Mod: TC

## 2025-08-19 PROCEDURE — 76770 US EXAM ABDO BACK WALL COMP: CPT | Mod: 26,,, | Performed by: RADIOLOGY

## (undated) DEVICE — SHEATH INTRODUCER SLENDER 6FX10CM

## (undated) DEVICE — CATHETER DIAGNOSTIC DXTERITY 5FR JR4.0

## (undated) DEVICE — GUIDEWIRE DOUBLE ENDED .035 DIA. 150CML

## (undated) DEVICE — GUIDEWIRE J TIP EXCHANGE FIXED CORE 260

## (undated) DEVICE — CATHETER RADIAL TIG 4.0 OPTITORQUE 5X110

## (undated) DEVICE — HEMOSTAT VASC BAND REG 24CM

## (undated) DEVICE — GUIDEWIRE J TIP BMW .014X190

## (undated) DEVICE — CATHETER GUIDE LAUNCHER EBU3.25 6FX100CM

## (undated) DEVICE — CATHETER GUIDE LAUNCHER EBU3.5 6X110